# Patient Record
Sex: FEMALE | Race: WHITE | Employment: OTHER | URBAN - METROPOLITAN AREA
[De-identification: names, ages, dates, MRNs, and addresses within clinical notes are randomized per-mention and may not be internally consistent; named-entity substitution may affect disease eponyms.]

---

## 2017-08-10 ENCOUNTER — HOSPITAL ENCOUNTER (OUTPATIENT)
Dept: GENERAL RADIOLOGY | Facility: HOSPITAL | Age: 67
Discharge: HOME | End: 2017-08-10
Attending: INTERNAL MEDICINE
Payer: COMMERCIAL

## 2017-08-10 DIAGNOSIS — M54.50 LUMBAR PAIN WITH RADIATION DOWN BOTH LEGS: ICD-10-CM

## 2017-08-10 DIAGNOSIS — M79.605 LUMBAR PAIN WITH RADIATION DOWN BOTH LEGS: ICD-10-CM

## 2017-08-10 DIAGNOSIS — M79.604 LUMBAR PAIN WITH RADIATION DOWN BOTH LEGS: ICD-10-CM

## 2017-08-10 PROCEDURE — 72110 X-RAY EXAM L-2 SPINE 4/>VWS: CPT | Performed by: INTERNAL MEDICINE

## 2017-09-07 PROBLEM — S32.030K COMPRESSION FRACTURE OF L3 LUMBAR VERTEBRA WITH NONUNION: Status: ACTIVE | Noted: 2017-09-07

## 2017-10-02 DIAGNOSIS — S32.030A COMPRESSION FRACTURE OF L3 LUMBAR VERTEBRA: Primary | ICD-10-CM

## 2017-10-05 ENCOUNTER — HOSPITAL ENCOUNTER (OUTPATIENT)
Dept: INTERVENTIONAL RADIOLOGY/VASCULAR | Facility: HOSPITAL | Age: 67
Discharge: HOME | End: 2017-10-05
Attending: RADIOLOGY | Admitting: RADIOLOGY
Payer: COMMERCIAL

## 2017-10-05 VITALS
HEART RATE: 72 BPM | RESPIRATION RATE: 20 BRPM | DIASTOLIC BLOOD PRESSURE: 79 MMHG | OXYGEN SATURATION: 100 % | SYSTOLIC BLOOD PRESSURE: 151 MMHG

## 2017-10-05 DIAGNOSIS — S32.030A COMPRESSION FRACTURE OF L3 LUMBAR VERTEBRA: ICD-10-CM

## 2017-10-05 PROCEDURE — 99152 MOD SED SAME PHYS/QHP 5/>YRS: CPT

## 2017-10-05 PROCEDURE — 22514 PERQ VERTEBRAL AUGMENTATION: CPT

## 2017-10-05 PROCEDURE — 0QS03ZZ REPOSITION LUMBAR VERTEBRA, PERCUTANEOUS APPROACH: ICD-10-PCS | Performed by: RADIOLOGY

## 2017-10-05 PROCEDURE — 99153 MOD SED SAME PHYS/QHP EA: CPT

## 2017-10-05 PROCEDURE — 0QU03JZ SUPPLEMENT LUMBAR VERTEBRA WITH SYNTHETIC SUBSTITUTE, PERCUTANEOUS APPROACH: ICD-10-PCS | Performed by: RADIOLOGY

## 2017-10-05 RX ORDER — SODIUM CHLORIDE 9 MG/ML
INJECTION, SOLUTION INTRAVENOUS
Status: COMPLETED
Start: 2017-10-05 | End: 2017-10-05

## 2017-10-05 RX ORDER — LIDOCAINE HYDROCHLORIDE 20 MG/ML
INJECTION, SOLUTION EPIDURAL; INFILTRATION; INTRACAUDAL; PERINEURAL
Status: COMPLETED
Start: 2017-10-05 | End: 2017-10-05

## 2017-10-05 RX ORDER — HYDROCODONE BITARTRATE AND ACETAMINOPHEN 5; 325 MG/1; MG/1
1 TABLET ORAL EVERY 6 HOURS PRN
Status: DISCONTINUED | OUTPATIENT
Start: 2017-10-05 | End: 2017-10-05

## 2017-10-05 RX ORDER — HYDROCODONE BITARTRATE AND ACETAMINOPHEN 5; 325 MG/1; MG/1
1 TABLET ORAL EVERY 6 HOURS PRN
Qty: 30 TABLET | Refills: 0 | Status: ON HOLD | OUTPATIENT
Start: 2017-10-05 | End: 2018-09-29

## 2017-10-05 RX ORDER — HYDROCODONE BITARTRATE AND ACETAMINOPHEN 5; 325 MG/1; MG/1
TABLET ORAL
Status: COMPLETED
Start: 2017-10-05 | End: 2017-10-05

## 2017-10-05 RX ORDER — SODIUM CHLORIDE 9 MG/ML
INJECTION, SOLUTION INTRAVENOUS CONTINUOUS
Status: DISCONTINUED | OUTPATIENT
Start: 2017-10-05 | End: 2017-10-05

## 2017-10-05 RX ORDER — MIDAZOLAM HYDROCHLORIDE 1 MG/ML
INJECTION INTRAMUSCULAR; INTRAVENOUS
Status: COMPLETED
Start: 2017-10-05 | End: 2017-10-05

## 2017-10-05 RX ADMIN — HYDROCODONE BITARTRATE AND ACETAMINOPHEN 1 TABLET: 5; 325 TABLET ORAL at 15:06:00

## 2017-10-06 NOTE — PROGRESS NOTES
Patient's back pain is multifactorial, and there is a radicular component that is more likely due to her degenerative disc disease. Dr. Socorro Palmer has asked her to return to clinic to prescribe physical rx.   She might also benefit from lumbar epidermal steroid

## 2017-10-11 DIAGNOSIS — M79.604 LUMBAR PAIN WITH RADIATION DOWN BOTH LEGS: ICD-10-CM

## 2017-10-11 DIAGNOSIS — M54.50 LUMBAR PAIN WITH RADIATION DOWN BOTH LEGS: ICD-10-CM

## 2017-10-11 DIAGNOSIS — M79.605 LUMBAR PAIN WITH RADIATION DOWN BOTH LEGS: ICD-10-CM

## 2017-10-11 DIAGNOSIS — S32.030G COMPRESSION FRACTURE OF L3 LUMBAR VERTEBRA WITH DELAYED HEALING: Primary | ICD-10-CM

## 2017-10-11 DIAGNOSIS — S32.030K COMPRESSION FRACTURE OF L3 LUMBAR VERTEBRA WITH NONUNION: ICD-10-CM

## 2018-09-11 PROCEDURE — 87086 URINE CULTURE/COLONY COUNT: CPT | Performed by: UROLOGY

## 2018-09-19 ENCOUNTER — ANESTHESIA (OUTPATIENT)
Dept: SURGERY | Facility: HOSPITAL | Age: 68
DRG: 354 | End: 2018-09-19
Payer: COMMERCIAL

## 2018-09-19 ENCOUNTER — APPOINTMENT (OUTPATIENT)
Dept: CT IMAGING | Facility: HOSPITAL | Age: 68
DRG: 354 | End: 2018-09-19
Attending: EMERGENCY MEDICINE
Payer: COMMERCIAL

## 2018-09-19 ENCOUNTER — HOSPITAL ENCOUNTER (INPATIENT)
Facility: HOSPITAL | Age: 68
LOS: 11 days | Discharge: HOME HEALTH | DRG: 354 | End: 2018-09-30
Attending: EMERGENCY MEDICINE | Admitting: HOSPITALIST
Payer: COMMERCIAL

## 2018-09-19 ENCOUNTER — ANESTHESIA EVENT (OUTPATIENT)
Dept: SURGERY | Facility: HOSPITAL | Age: 68
DRG: 354 | End: 2018-09-19
Payer: COMMERCIAL

## 2018-09-19 DIAGNOSIS — K42.0 UMBILICAL HERNIA WITH OBSTRUCTION, WITHOUT GANGRENE: Primary | ICD-10-CM

## 2018-09-19 DIAGNOSIS — R18.8 OTHER ASCITES: ICD-10-CM

## 2018-09-19 DIAGNOSIS — K42.0 INCARCERATED UMBILICAL HERNIA: ICD-10-CM

## 2018-09-19 PROBLEM — D64.9 ANEMIA: Status: ACTIVE | Noted: 2018-09-19

## 2018-09-19 PROBLEM — R73.9 HYPERGLYCEMIA: Status: ACTIVE | Noted: 2018-09-19

## 2018-09-19 PROCEDURE — 96375 TX/PRO/DX INJ NEW DRUG ADDON: CPT

## 2018-09-19 PROCEDURE — 85610 PROTHROMBIN TIME: CPT | Performed by: EMERGENCY MEDICINE

## 2018-09-19 PROCEDURE — 85025 COMPLETE CBC W/AUTO DIFF WBC: CPT | Performed by: EMERGENCY MEDICINE

## 2018-09-19 PROCEDURE — 86850 RBC ANTIBODY SCREEN: CPT | Performed by: EMERGENCY MEDICINE

## 2018-09-19 PROCEDURE — 0WUF0JZ SUPPLEMENT ABDOMINAL WALL WITH SYNTHETIC SUBSTITUTE, OPEN APPROACH: ICD-10-PCS | Performed by: SURGERY

## 2018-09-19 PROCEDURE — 96374 THER/PROPH/DIAG INJ IV PUSH: CPT

## 2018-09-19 PROCEDURE — 80053 COMPREHEN METABOLIC PANEL: CPT | Performed by: EMERGENCY MEDICINE

## 2018-09-19 PROCEDURE — 99285 EMERGENCY DEPT VISIT HI MDM: CPT

## 2018-09-19 PROCEDURE — S0028 INJECTION, FAMOTIDINE, 20 MG: HCPCS | Performed by: SURGERY

## 2018-09-19 PROCEDURE — 86708 HEPATITIS A ANTIBODY: CPT | Performed by: HOSPITALIST

## 2018-09-19 PROCEDURE — 85730 THROMBOPLASTIN TIME PARTIAL: CPT | Performed by: EMERGENCY MEDICINE

## 2018-09-19 PROCEDURE — 87340 HEPATITIS B SURFACE AG IA: CPT | Performed by: HOSPITALIST

## 2018-09-19 PROCEDURE — 96376 TX/PRO/DX INJ SAME DRUG ADON: CPT

## 2018-09-19 PROCEDURE — 88112 CYTOPATH CELL ENHANCE TECH: CPT | Performed by: SURGERY

## 2018-09-19 PROCEDURE — A4216 STERILE WATER/SALINE, 10 ML: HCPCS

## 2018-09-19 PROCEDURE — 86704 HEP B CORE ANTIBODY TOTAL: CPT | Performed by: HOSPITALIST

## 2018-09-19 PROCEDURE — 86900 BLOOD TYPING SEROLOGIC ABO: CPT | Performed by: EMERGENCY MEDICINE

## 2018-09-19 PROCEDURE — 96361 HYDRATE IV INFUSION ADD-ON: CPT

## 2018-09-19 PROCEDURE — 74177 CT ABD & PELVIS W/CONTRAST: CPT | Performed by: EMERGENCY MEDICINE

## 2018-09-19 PROCEDURE — 80500 HEPATITIS A B + C PROFILE: CPT | Performed by: HOSPITALIST

## 2018-09-19 PROCEDURE — 88302 TISSUE EXAM BY PATHOLOGIST: CPT | Performed by: SURGERY

## 2018-09-19 PROCEDURE — 86901 BLOOD TYPING SEROLOGIC RH(D): CPT | Performed by: EMERGENCY MEDICINE

## 2018-09-19 PROCEDURE — A4216 STERILE WATER/SALINE, 10 ML: HCPCS | Performed by: SURGERY

## 2018-09-19 PROCEDURE — 86803 HEPATITIS C AB TEST: CPT | Performed by: HOSPITALIST

## 2018-09-19 PROCEDURE — 86706 HEP B SURFACE ANTIBODY: CPT | Performed by: HOSPITALIST

## 2018-09-19 DEVICE — VENTRALEX ST HERNIA PATCH
Type: IMPLANTABLE DEVICE | Site: UMBILICAL | Status: FUNCTIONAL
Brand: VENTRALEX ST HERNIA PATCH

## 2018-09-19 RX ORDER — ROCURONIUM BROMIDE 10 MG/ML
INJECTION, SOLUTION INTRAVENOUS AS NEEDED
Status: DISCONTINUED | OUTPATIENT
Start: 2018-09-19 | End: 2018-09-19 | Stop reason: SURG

## 2018-09-19 RX ORDER — NEOSTIGMINE METHYLSULFATE 0.5 MG/ML
INJECTION INTRAVENOUS AS NEEDED
Status: DISCONTINUED | OUTPATIENT
Start: 2018-09-19 | End: 2018-09-19 | Stop reason: SURG

## 2018-09-19 RX ORDER — ONDANSETRON 2 MG/ML
4 INJECTION INTRAMUSCULAR; INTRAVENOUS ONCE AS NEEDED
Status: DISCONTINUED | OUTPATIENT
Start: 2018-09-19 | End: 2018-09-19 | Stop reason: HOSPADM

## 2018-09-19 RX ORDER — ONDANSETRON 2 MG/ML
4 INJECTION INTRAMUSCULAR; INTRAVENOUS EVERY 6 HOURS PRN
Status: DISCONTINUED | OUTPATIENT
Start: 2018-09-19 | End: 2018-09-19

## 2018-09-19 RX ORDER — SODIUM CHLORIDE, SODIUM LACTATE, POTASSIUM CHLORIDE, CALCIUM CHLORIDE 600; 310; 30; 20 MG/100ML; MG/100ML; MG/100ML; MG/100ML
INJECTION, SOLUTION INTRAVENOUS CONTINUOUS
Status: DISCONTINUED | OUTPATIENT
Start: 2018-09-19 | End: 2018-09-19

## 2018-09-19 RX ORDER — ONDANSETRON 2 MG/ML
INJECTION INTRAMUSCULAR; INTRAVENOUS
Status: DISPENSED
Start: 2018-09-19 | End: 2018-09-20

## 2018-09-19 RX ORDER — MORPHINE SULFATE 2 MG/ML
1 INJECTION, SOLUTION INTRAMUSCULAR; INTRAVENOUS EVERY 2 HOUR PRN
Status: DISCONTINUED | OUTPATIENT
Start: 2018-09-19 | End: 2018-09-27

## 2018-09-19 RX ORDER — HEPARIN SODIUM 5000 [USP'U]/ML
5000 INJECTION, SOLUTION INTRAVENOUS; SUBCUTANEOUS EVERY 12 HOURS SCHEDULED
Status: DISCONTINUED | OUTPATIENT
Start: 2018-09-19 | End: 2018-09-24

## 2018-09-19 RX ORDER — METOCLOPRAMIDE HYDROCHLORIDE 5 MG/ML
10 INJECTION INTRAMUSCULAR; INTRAVENOUS EVERY 8 HOURS PRN
Status: DISCONTINUED | OUTPATIENT
Start: 2018-09-19 | End: 2018-09-30

## 2018-09-19 RX ORDER — FAMOTIDINE 20 MG/1
20 TABLET ORAL 2 TIMES DAILY
Status: DISCONTINUED | OUTPATIENT
Start: 2018-09-19 | End: 2018-09-27

## 2018-09-19 RX ORDER — MORPHINE SULFATE 4 MG/ML
4 INJECTION, SOLUTION INTRAMUSCULAR; INTRAVENOUS EVERY 2 HOUR PRN
Status: DISCONTINUED | OUTPATIENT
Start: 2018-09-19 | End: 2018-09-26

## 2018-09-19 RX ORDER — ONDANSETRON 2 MG/ML
4 INJECTION INTRAMUSCULAR; INTRAVENOUS EVERY 6 HOURS PRN
Status: DISCONTINUED | OUTPATIENT
Start: 2018-09-19 | End: 2018-09-30

## 2018-09-19 RX ORDER — HYDROCODONE BITARTRATE AND ACETAMINOPHEN 5; 325 MG/1; MG/1
1 TABLET ORAL AS NEEDED
Status: DISCONTINUED | OUTPATIENT
Start: 2018-09-19 | End: 2018-09-19 | Stop reason: HOSPADM

## 2018-09-19 RX ORDER — MORPHINE SULFATE 10 MG/ML
6 INJECTION, SOLUTION INTRAMUSCULAR; INTRAVENOUS EVERY 10 MIN PRN
Status: DISCONTINUED | OUTPATIENT
Start: 2018-09-19 | End: 2018-09-19 | Stop reason: HOSPADM

## 2018-09-19 RX ORDER — NALOXONE HYDROCHLORIDE 1 MG/ML
80 INJECTION INTRAMUSCULAR; INTRAVENOUS; SUBCUTANEOUS AS NEEDED
Status: DISCONTINUED | OUTPATIENT
Start: 2018-09-19 | End: 2018-09-19 | Stop reason: HOSPADM

## 2018-09-19 RX ORDER — SODIUM CHLORIDE, SODIUM LACTATE, POTASSIUM CHLORIDE, CALCIUM CHLORIDE 600; 310; 30; 20 MG/100ML; MG/100ML; MG/100ML; MG/100ML
INJECTION, SOLUTION INTRAVENOUS CONTINUOUS
Status: DISCONTINUED | OUTPATIENT
Start: 2018-09-19 | End: 2018-09-19 | Stop reason: HOSPADM

## 2018-09-19 RX ORDER — GLYCOPYRROLATE 0.2 MG/ML
INJECTION INTRAMUSCULAR; INTRAVENOUS AS NEEDED
Status: DISCONTINUED | OUTPATIENT
Start: 2018-09-19 | End: 2018-09-19 | Stop reason: SURG

## 2018-09-19 RX ORDER — MORPHINE SULFATE 2 MG/ML
2 INJECTION, SOLUTION INTRAMUSCULAR; INTRAVENOUS EVERY 2 HOUR PRN
Status: DISCONTINUED | OUTPATIENT
Start: 2018-09-19 | End: 2018-09-26

## 2018-09-19 RX ORDER — HALOPERIDOL 5 MG/ML
0.25 INJECTION INTRAMUSCULAR ONCE AS NEEDED
Status: DISCONTINUED | OUTPATIENT
Start: 2018-09-19 | End: 2018-09-19 | Stop reason: HOSPADM

## 2018-09-19 RX ORDER — ACETAMINOPHEN 325 MG/1
650 TABLET ORAL EVERY 6 HOURS PRN
Status: DISCONTINUED | OUTPATIENT
Start: 2018-09-19 | End: 2018-09-27

## 2018-09-19 RX ORDER — ONDANSETRON 2 MG/ML
4 INJECTION INTRAMUSCULAR; INTRAVENOUS ONCE
Status: COMPLETED | OUTPATIENT
Start: 2018-09-19 | End: 2018-09-19

## 2018-09-19 RX ORDER — MORPHINE SULFATE 4 MG/ML
2 INJECTION, SOLUTION INTRAMUSCULAR; INTRAVENOUS EVERY 10 MIN PRN
Status: DISCONTINUED | OUTPATIENT
Start: 2018-09-19 | End: 2018-09-19 | Stop reason: HOSPADM

## 2018-09-19 RX ORDER — MORPHINE SULFATE 4 MG/ML
4 INJECTION, SOLUTION INTRAMUSCULAR; INTRAVENOUS ONCE
Status: COMPLETED | OUTPATIENT
Start: 2018-09-19 | End: 2018-09-19

## 2018-09-19 RX ORDER — HYDROCODONE BITARTRATE AND ACETAMINOPHEN 5; 325 MG/1; MG/1
2 TABLET ORAL AS NEEDED
Status: DISCONTINUED | OUTPATIENT
Start: 2018-09-19 | End: 2018-09-19 | Stop reason: HOSPADM

## 2018-09-19 RX ORDER — BUPIVACAINE HYDROCHLORIDE 5 MG/ML
INJECTION, SOLUTION EPIDURAL; INTRACAUDAL AS NEEDED
Status: DISCONTINUED | OUTPATIENT
Start: 2018-09-19 | End: 2018-09-19 | Stop reason: HOSPADM

## 2018-09-19 RX ORDER — EPHEDRINE SULFATE 50 MG/ML
INJECTION, SOLUTION INTRAVENOUS AS NEEDED
Status: DISCONTINUED | OUTPATIENT
Start: 2018-09-19 | End: 2018-09-19 | Stop reason: SURG

## 2018-09-19 RX ORDER — MORPHINE SULFATE 4 MG/ML
4 INJECTION, SOLUTION INTRAMUSCULAR; INTRAVENOUS EVERY 10 MIN PRN
Status: DISCONTINUED | OUTPATIENT
Start: 2018-09-19 | End: 2018-09-19 | Stop reason: HOSPADM

## 2018-09-19 RX ORDER — FAMOTIDINE 10 MG/ML
20 INJECTION, SOLUTION INTRAVENOUS 2 TIMES DAILY
Status: DISCONTINUED | OUTPATIENT
Start: 2018-09-19 | End: 2018-09-27

## 2018-09-19 RX ORDER — SODIUM CHLORIDE 0.9 % (FLUSH) 0.9 %
10 SYRINGE (ML) INJECTION AS NEEDED
Status: DISCONTINUED | OUTPATIENT
Start: 2018-09-19 | End: 2018-09-30

## 2018-09-19 RX ORDER — SODIUM CHLORIDE 9 MG/ML
INJECTION, SOLUTION INTRAVENOUS CONTINUOUS
Status: DISPENSED | OUTPATIENT
Start: 2018-09-19 | End: 2018-09-20

## 2018-09-19 RX ORDER — ACETAMINOPHEN 500 MG
1000 TABLET ORAL ONCE
Status: DISCONTINUED | OUTPATIENT
Start: 2018-09-19 | End: 2018-09-19 | Stop reason: HOSPADM

## 2018-09-19 RX ORDER — 0.9 % SODIUM CHLORIDE 0.9 %
VIAL (ML) INJECTION
Status: COMPLETED
Start: 2018-09-19 | End: 2018-09-19

## 2018-09-19 RX ADMIN — GLYCOPYRROLATE 0.8 MG: 0.2 INJECTION INTRAMUSCULAR; INTRAVENOUS at 09:30:00

## 2018-09-19 RX ADMIN — EPHEDRINE SULFATE 10 MG: 50 INJECTION, SOLUTION INTRAVENOUS at 08:30:00

## 2018-09-19 RX ADMIN — ROCURONIUM BROMIDE 30 MG: 10 INJECTION, SOLUTION INTRAVENOUS at 08:25:00

## 2018-09-19 RX ADMIN — NEOSTIGMINE METHYLSULFATE 5 MG: 0.5 INJECTION INTRAVENOUS at 09:30:00

## 2018-09-19 RX ADMIN — SODIUM CHLORIDE, SODIUM LACTATE, POTASSIUM CHLORIDE, CALCIUM CHLORIDE: 600; 310; 30; 20 INJECTION, SOLUTION INTRAVENOUS at 09:52:00

## 2018-09-19 RX ADMIN — SODIUM CHLORIDE, SODIUM LACTATE, POTASSIUM CHLORIDE, CALCIUM CHLORIDE: 600; 310; 30; 20 INJECTION, SOLUTION INTRAVENOUS at 08:25:00

## 2018-09-19 NOTE — ANESTHESIA POSTPROCEDURE EVALUATION
Patient: Lisa Mallory    Procedure Summary     Date:  09/19/18 Room / Location:  [address] [place] [address] MAIN OR 05 / [address] [place] [address] MAIN OR    Anesthesia Start:  0014 Anesthesia Stop:      Procedures:        HERNIA UMBILICAL REPAIR ADULT (N/A )      SMALL BOWEL OBSTRUCTION RELEASE (N/

## 2018-09-19 NOTE — ED PROVIDER NOTES
Patient Seen in: [facility] AND [facility] Emergency Department    History   Patient presents with:  Hernia    Stated Complaint: sent in by PCP for umbilical hernia    HPI    79 yo female with painful swelling to the umbilicus that she has had for several months and reactive to light. Neck: Normal range of motion. Neck supple. Cardiovascular: Normal rate, regular rhythm, normal heart sounds and intact distal pulses. Pulmonary/Chest: Effort normal and breath sounds normal.   Abdominal: Soft.  Bowel sounds are CBC WITH DIFFERENTIAL WITH PLATELET.   Procedure                               Abnormality         Status                     ---------                               -----------         ------                     CBC W/ DIFFERENTIAL[[id number]]          Abno diverticulosis. Probable wet bowel pattern in the right and transverse colon favored over colitis. Air-filled appendix. Moderate L3 compression deformity with kyphoplasty cement.   Severe L5-S1 degenerative disc disease with bilateral pars defects and

## 2018-09-19 NOTE — BRIEF OP NOTE
Pre-Operative Diagnosis: Incarcerated incisional hernia   Post-Operative Diagnosis: Incarcerated incisional hernia    Procedure Performed:   Procedure(s):  reduction and repair of incarcerated umbilical hernia  With mesh, small ventralex    Surgeon(s) and

## 2018-09-19 NOTE — ANESTHESIA PROCEDURE NOTES
Arterial Line  Performed by: Jass Subramanian MD  Authorized by: Jass Subramanian MD     Procedure Start:  9/19/2018 8:31 AM  Procedure End:  9/19/2018 8:35 AM  Site Identification: surface landmarks    Patient Location:  OR  Indication: continuous b

## 2018-09-19 NOTE — ANESTHESIA PROCEDURE NOTES
Peripheral IV  Date/Time: 9/19/2018 8:41 AM  Inserted by: Jose Dior MD    Placement  Laterality: left  Location: hand  Local anesthetic: none  Site prep: alcohol  Technique: anatomical landmarks  Attempts: 1

## 2018-09-19 NOTE — ANESTHESIA PROCEDURE NOTES
ANESTHESIA INTUBATION  Date/Time: 9/19/2018 8:40 AM  Urgency: elective    Airway not difficult    General Information and Staff    Patient location during procedure: OR  Anesthesiologist: Jonathan Maldonado MD  Performed: anesthesiologist     Indications a

## 2018-09-19 NOTE — ANESTHESIA PREPROCEDURE EVALUATION
Anesthesia PreOp Note    HPI:     David Peguero is a 78year old female who presents for preoperative consultation requested by: Kathy Anglin MD    Date of Surgery: 9/19/2018    Procedure(s):   HERNIA UMBILICAL REPAIR ADULT  SMALL BOWEL OBSTRUCTION RELEA (six) hours as needed for Pain. Disp: 30 tablet Rfl: 0    Fluticasone Propionate 50 MCG/ACT Nasal Suspension 2 sprays by Each Nare route daily.  Disp:  Rfl:  Not Taking       Current Facility-Administered Medications Ordered in Epic:  [MAR Hold] acetaminoph Packs/day: 1.00        Years: 20.00        Pack years: 21        Quit date: 1950        Years since quittin.3      Smokeless tobacco: Never Used    Substance and Sexual Activity      Alcohol use: No        Alcohol/week: 0.0 oz      Drug use: No Anesthesia Plan:   ASA:  3  Emergent    Plan:   General  Airway:  ETT  Post-op Pain Management: IV analgesics      I have informed Teresita Campos and/or legal guardian or family member of the nature of the anesthetic plan, benefits, risks incl

## 2018-09-19 NOTE — H&P
[facility] Hospitalist H&P       CC: Patient presents with:  Hernia       PCP: Jeromy Padilla MD    ASSESSMENT / PLAN:    Patient is a 78year old female with PMH sig for allergic rhinitis who presents with a c/co of abdominal pain.     Incarcerated hernia s/p Medications Marked as Taking for the 9/19/18 encounter [facility] Encounter):  Pantoprazole Sodium (PROTONIX) 40 MG Oral Tab EC Take 1 tablet (40 mg total) by mouth every morning before breakfast. Disp: 90 tablet Rfl: 3   Acetaminophen-Codeine #3 300-30 MG or supraclavicular lymph adenopathy, thyroid: no enlargment/tenderness/nodules appreciated   Lungs:   Clear to auscultation bilaterally. Normal effort   Chest wall:  No tenderness or deformity.    Heart:  Regular rate and rhythm, S1, S2 normal, no murmur, r results in gas and fluid distention of small bowel proximal to the hernia, and distal decompression. Findings are suspicious for incarcerated hernia resulting in small bowel obstruction.   2. Cirrhosis with stigmata of portal hypertension including numerous

## 2018-09-19 NOTE — H&P
[facility] HOSP - [facility]    Report of Consultation    Kirstin Brown Patient Status:  Inpatient    1950 MRN [id number]   Location [facility] 4W/SW/SE Attending Marie Baig MD   Hosp Day # 0 PCP Roddy Churchill MD     Date of Admis see her dictation for the details of those   surgeries.   SURGEON:  Dr. Celso Jackson       History:  Past Medical History:   Diagnosis Date   • Allergic rhinitis    • Chronic lumbar pain    • Insomnia    • Recurrent UTI      Past Surgical History:  No °C), temperature source Oral, resp. rate 18, height 67\", weight 177 lb 6.4 oz (80.5 kg), SpO2 97 %. General: Alert, orientated x3. Cooperative. No apparent distress. Patient has mild to moderate discomfort. HEENT: Exam is unremarkable.   Without scl stigmata of portal hypertension including numerous portosystemic collateral vessels, mild to moderate diffuse ascites, borderline splenomegaly. 3. Wall thickening throughout the colon with relative sparing of the distal sigmoid and rectum.  Findings may be

## 2018-09-19 NOTE — ED INITIAL ASSESSMENT (HPI)
Pt reports soreness to umbilical hernia, states that she has had this for a couple of months and was told by EMTs that it looked like it was going to rupture. Pt's PCP told her to come to the ER for eval. Pt states that the pain brought her in tonight.  Darline Smalls

## 2018-09-20 PROCEDURE — 80048 BASIC METABOLIC PNL TOTAL CA: CPT | Performed by: SURGERY

## 2018-09-20 PROCEDURE — S0028 INJECTION, FAMOTIDINE, 20 MG: HCPCS | Performed by: SURGERY

## 2018-09-20 PROCEDURE — 83735 ASSAY OF MAGNESIUM: CPT | Performed by: HOSPITALIST

## 2018-09-20 PROCEDURE — 85027 COMPLETE CBC AUTOMATED: CPT | Performed by: SURGERY

## 2018-09-20 RX ORDER — SODIUM CHLORIDE 9 MG/ML
INJECTION, SOLUTION INTRAVENOUS
Status: COMPLETED
Start: 2018-09-20 | End: 2018-09-20

## 2018-09-20 RX ORDER — MAGNESIUM SULFATE HEPTAHYDRATE 40 MG/ML
2 INJECTION, SOLUTION INTRAVENOUS ONCE
Status: COMPLETED | OUTPATIENT
Start: 2018-09-20 | End: 2018-09-20

## 2018-09-20 RX ORDER — SODIUM CHLORIDE 9 MG/ML
INJECTION, SOLUTION INTRAVENOUS CONTINUOUS
Status: DISCONTINUED | OUTPATIENT
Start: 2018-09-20 | End: 2018-09-20

## 2018-09-20 RX ORDER — SODIUM CHLORIDE 9 MG/ML
INJECTION, SOLUTION INTRAVENOUS CONTINUOUS
Status: DISCONTINUED | OUTPATIENT
Start: 2018-09-20 | End: 2018-09-23

## 2018-09-20 NOTE — PROGRESS NOTES
[facility] Hospitalist Progress note       CC: Patient presents with:  Hernia     CC; follow up    PCP: Zaki Fam MD    ASSESSMENT / PLAN:   Ms. Venice Robison is a 78year old female with PMH sig for allergic rhinitis who presents with a c/co of abdominal pain. kg)  07/03/18 1336 : 181 lb (82.1 kg)      Exam  Gen: No acute distress, alert and oriented x3  Neck Supple, no JVD  Pulm: Lungs clear, normal respiratory effort, No wheezing or crackles  CV: Heart with regular rate and rhythm, No murmurs, rubs, gallops  A (er)    Result Date: 9/19/2018  CONCLUSION:   1. There is an umbilical hernia containing fat and a short segment of small bowel. This results in gas and fluid distention of small bowel proximal to the hernia, and distal decompression.  Findings are suspicio

## 2018-09-20 NOTE — PROGRESS NOTES
[facility]    Progress Note    Demian Ham Patient Status:  Inpatient    1950 MRN [id number]   Location [facility] 4W/SW/SE Attending Traci aMck MD   Hosp Day # 1 PCP Pao Frias MD            Subjective: Ct Abdomen Pelvis Iv Contrast, No Oral (er)    Result Date: 9/19/2018  CONCLUSION:   1. There is an umbilical hernia containing fat and a short segment of small bowel.  This results in gas and fluid distention of small bowel proximal to the ravi

## 2018-09-20 NOTE — OPERATIVE REPORT
[facility]    PATIENT'S NAME: Alfredo HOWARD   ATTENDING PHYSICIAN: Alex Ness DO   OPERATING PHYSICIAN: Vidal Pickard MD   PATIENT ACCOUNT#:   [id number]    LOCATION:  [address] [place] [address] RECORD #:   [id number]       DATE OF BIRTH:  [birth year] she had an elevated INR and ascites consistent with liver disease. She looked like she had cirrhosis on CT scan, as well. This was new and undiagnosed.   I explained there was increased risk of postop problems with ascitic leak, recurrence of the herni without suturing it. The skin closed at the skin edges subcutaneous and then subcuticular stitch placed. Sterile dressings applied. Before closing, hemostasis had been carefully assured.   A small drain was placed, brought out laterally, which we will ta

## 2018-09-21 PROCEDURE — 83735 ASSAY OF MAGNESIUM: CPT | Performed by: HOSPITALIST

## 2018-09-21 PROCEDURE — 84132 ASSAY OF SERUM POTASSIUM: CPT | Performed by: HOSPITALIST

## 2018-09-21 RX ORDER — HYDROCODONE BITARTRATE AND ACETAMINOPHEN 7.5; 325 MG/1; MG/1
1 TABLET ORAL EVERY 4 HOURS PRN
Status: DISCONTINUED | OUTPATIENT
Start: 2018-09-21 | End: 2018-09-27

## 2018-09-21 NOTE — PLAN OF CARE
GASTROINTESTINAL - ADULT    • Minimal or absence of nausea and vomiting Progressing    • Maintains or returns to baseline bowel function Progressing        GENITOURINARY - ADULT    • Absence of urinary retention Not Progressing        Impaired Activities o

## 2018-09-21 NOTE — PROGRESS NOTES
[facility] Hospitalist Progress note       CC: Patient presents with:  Hernia     CC; follow up    PCP: Harini Chan MD    ASSESSMENT / PLAN:   Ms. Ruth Ann Fuentes is a 78year old female with PMH sig for allergic rhinitis who presents with a c/co of abdominal pain. distress, alert and oriented x3  Neck Supple, no JVD  Pulm: Lungs clear, normal respiratory effort, No wheezing or crackles  CV: Heart with regular rate and rhythm, No murmurs, rubs, gallops  Abd: Abdomen soft, mildly distended, very hypoactive BS, tender hypertension including moderate volume ascites    Ct Abdomen Pelvis Iv Contrast, No Oral (er)    Result Date: 9/19/2018  CONCLUSION:   1. There is an umbilical hernia containing fat and a short segment of small bowel.  This results in gas and fluid distenti

## 2018-09-22 ENCOUNTER — APPOINTMENT (OUTPATIENT)
Dept: GENERAL RADIOLOGY | Facility: HOSPITAL | Age: 68
DRG: 354 | End: 2018-09-22
Attending: SURGERY
Payer: COMMERCIAL

## 2018-09-22 PROCEDURE — 85025 COMPLETE CBC W/AUTO DIFF WBC: CPT | Performed by: HOSPITALIST

## 2018-09-22 PROCEDURE — 80048 BASIC METABOLIC PNL TOTAL CA: CPT | Performed by: HOSPITALIST

## 2018-09-22 PROCEDURE — 85610 PROTHROMBIN TIME: CPT | Performed by: HOSPITALIST

## 2018-09-22 PROCEDURE — 74019 RADEX ABDOMEN 2 VIEWS: CPT | Performed by: SURGERY

## 2018-09-22 PROCEDURE — 86850 RBC ANTIBODY SCREEN: CPT | Performed by: HOSPITALIST

## 2018-09-22 PROCEDURE — 86901 BLOOD TYPING SEROLOGIC RH(D): CPT | Performed by: HOSPITALIST

## 2018-09-22 PROCEDURE — A4216 STERILE WATER/SALINE, 10 ML: HCPCS | Performed by: SURGERY

## 2018-09-22 PROCEDURE — S0028 INJECTION, FAMOTIDINE, 20 MG: HCPCS | Performed by: SURGERY

## 2018-09-22 PROCEDURE — 86900 BLOOD TYPING SEROLOGIC ABO: CPT | Performed by: HOSPITALIST

## 2018-09-22 NOTE — PROGRESS NOTES
[facility] Hospitalist Progress note       CC: Patient presents with:  Hernia     CC; follow up    PCP: Kaci Bermudez MD    ASSESSMENT / PLAN:   Ms. Bascom Litten is a 78year old female with PMH sig for allergic rhinitis who presents with a c/co of abdominal pain. acute distress, alert and oriented x3  Neck Supple, no JVD  Pulm: Lungs clear, normal respiratory effort, No wheezing or crackles  CV: Heart with regular rate and rhythm, No murmurs, rubs, gallops  Abd: Abdomen soft, mildly distended, very hypoactive BS, t ascending colon. Imaging findings may represent diverticulitis. No loculated abscess, free air or bowel obstruction. Imaging follow-up to assure resolution and exclude underlying malignancy is advised.  2. Cirrhotic appearing liver with sequelae of portal h

## 2018-09-22 NOTE — PROGRESS NOTES
[facility]    Progress Note    Radha Celaya Patient Status:  Inpatient    1950 MRN [id number]   Location [facility] 4W/SW/SE Attending Houston Soares MD   Hosp Day # 2 PCP Ghislaine Person MD            Subjective: --    ALKPHO  159*   --    --    AST  38   --    --    ALT  16   --    --    BILT  1.2   --    --    TP  7.1   --    --                          Assessment and Plan:         Hyperglycemia     Hyperglycemia        Umbilical hernia with obstruction, without

## 2018-09-23 PROCEDURE — 84466 ASSAY OF TRANSFERRIN: CPT | Performed by: INTERNAL MEDICINE

## 2018-09-23 PROCEDURE — 85610 PROTHROMBIN TIME: CPT | Performed by: HOSPITALIST

## 2018-09-23 PROCEDURE — 86256 FLUORESCENT ANTIBODY TITER: CPT | Performed by: INTERNAL MEDICINE

## 2018-09-23 PROCEDURE — 86038 ANTINUCLEAR ANTIBODIES: CPT | Performed by: INTERNAL MEDICINE

## 2018-09-23 PROCEDURE — 84132 ASSAY OF SERUM POTASSIUM: CPT | Performed by: SURGERY

## 2018-09-23 PROCEDURE — 82390 ASSAY OF CERULOPLASMIN: CPT | Performed by: INTERNAL MEDICINE

## 2018-09-23 PROCEDURE — 82140 ASSAY OF AMMONIA: CPT | Performed by: INTERNAL MEDICINE

## 2018-09-23 PROCEDURE — 80048 BASIC METABOLIC PNL TOTAL CA: CPT | Performed by: HOSPITALIST

## 2018-09-23 PROCEDURE — 86376 MICROSOMAL ANTIBODY EACH: CPT | Performed by: INTERNAL MEDICINE

## 2018-09-23 PROCEDURE — 83540 ASSAY OF IRON: CPT | Performed by: INTERNAL MEDICINE

## 2018-09-23 PROCEDURE — 85025 COMPLETE CBC W/AUTO DIFF WBC: CPT | Performed by: HOSPITALIST

## 2018-09-23 PROCEDURE — A4216 STERILE WATER/SALINE, 10 ML: HCPCS | Performed by: SURGERY

## 2018-09-23 PROCEDURE — 82103 ALPHA-1-ANTITRYPSIN TOTAL: CPT | Performed by: INTERNAL MEDICINE

## 2018-09-23 RX ORDER — SPIRONOLACTONE 50 MG/1
50 TABLET, FILM COATED ORAL DAILY
Status: DISCONTINUED | OUTPATIENT
Start: 2018-09-23 | End: 2018-09-30

## 2018-09-23 RX ORDER — BISACODYL 10 MG
10 SUPPOSITORY, RECTAL RECTAL 2 TIMES DAILY
Status: DISPENSED | OUTPATIENT
Start: 2018-09-23 | End: 2018-09-25

## 2018-09-23 RX ORDER — LIDOCAINE 50 MG/G
2 PATCH TOPICAL DAILY
Status: DISCONTINUED | OUTPATIENT
Start: 2018-09-23 | End: 2018-09-30

## 2018-09-23 RX ORDER — SPIRONOLACTONE 25 MG/1
25 TABLET ORAL DAILY
Status: DISCONTINUED | OUTPATIENT
Start: 2018-09-24 | End: 2018-09-23

## 2018-09-23 RX ORDER — FUROSEMIDE 10 MG/ML
20 INJECTION INTRAMUSCULAR; INTRAVENOUS ONCE
Status: COMPLETED | OUTPATIENT
Start: 2018-09-23 | End: 2018-09-23

## 2018-09-23 NOTE — PROGRESS NOTES
[facility]    Progress Note    Saray Malhotra Patient Status:  Inpatient    1950 MRN [id number]   Location [facility] 4W/SW/SE Attending Donny Burdick MD   Hosp Day # 4 PCP Roz Roca MD            Subjective: 143*  100*   --   92  117*   BUN  8  9   --   5*  3*   CREATSERUM  0.56  0.56   --   0.48*  0.47*   GFRAA  >60  >60   --   >60  >60   GFRNAA  >60  >60   --   >60  >60   CA  8.7  8.2*   --   7.8*  8.2*   ALB  3.4*   --    --    --    --    NA  135*  137   -

## 2018-09-23 NOTE — CONSULTS
[facility] HOSP - [facility]    Report of Consultation    Teresita Campos Patient Status:  Inpatient    1950 MRN [id number]   Location [facility] 4W/SW/SE Attending Wanda Valle MD   Hosp Day # 4 PCP Emelyn Mejia MD     Date of Admis 1949        Years since quittin.3      Smokeless tobacco: Never Used    Alcohol use: No      Alcohol/week: 0.0 oz    Drug use: No         Current Medications:    Current Facility-Administered Medications:  lidocaine (LIDODERM) 5 % 2 patch 2 patch are noted in HPI. Physical Exam:   Blood pressure 135/64, pulse 73, temperature 97.2 °F (36.2 °C), temperature source Oral, resp. rate 18, height 5' 7\" (1.702 m), weight 177 lb 6.4 oz (80.5 kg), SpO2 100 %.     General appearance:  alert, appears stated INR 1.7 (H) 09/23/2018    PTP 19.2 (H) 09/23/2018    MG 2.1 09/21/2018         Imaging:  Xr Abdomen 2 Views(cpt=74019)    Result Date: 9/22/2018  CONCLUSION:  1. Diffuse ileus.      Dictated by (CST): Zac Herr MD on 9/22/2018 at 8:59     A

## 2018-09-23 NOTE — PROGRESS NOTES
[facility] Hospitalist Progress note       CC: Patient presents with:  Hernia     CC; follow up    PCP: Zulay Galindo MD    ASSESSMENT / PLAN:   Ms. Radha Vega is a 78year old female with PMH sig for allergic rhinitis who presents with a c/co of abdominal pain. Ivonne Fierro MD  [facility] Hospitalist  Answering service: [phone]      Subjective       Had 3 episodes of flatus so far today, still pretty distended, having a lot of LBP, chronic issue. No CP or SOB. DId ambulate already today.   Some nausea, no vomiting, Recent Labs   Lab  09/19/18   0112  09/20/18   0628  09/22/18   0545  09/23/18   0641   WBC  4.3  6.7  5.9  5.8   HGB  8.7*  8.3*  8.0*  8.4*   MCV  95.7  96.1  94.9  96.2   PLT  157  147  152  185   INR  1.5*   --   1.7*  1.7*       Recent Labs   La ascites, borderline splenomegaly. 3. Wall thickening throughout the colon with relative sparing of the distal sigmoid and rectum. Findings may be on the basis of portal hypertensive colopathy or nonspecific colitis.   4. Minimal intraluminal gas within the

## 2018-09-23 NOTE — PROGRESS NOTES
[facility]    Progress Note    Angelika Addison Patient Status:  Inpatient    1950 MRN [id number]   Location [facility] 4W/SW/SE Attending Jayce Ma MD   Hosp Day # 3 PCP Danica Messer MD            Subjective: >60   GFRNAA  >60  >60   --   >60   CA  8.7  8.2*   --   7.8*   ALB  3.4*   --    --    --    NA  135*  137   --   135*   K  3.3  3.4  3.9  3.3   CL  103  108   --   108   CO2  24  24   --   25   ALKPHO  159*   --    --    --    AST  38   --    --    --

## 2018-09-24 ENCOUNTER — APPOINTMENT (OUTPATIENT)
Dept: ULTRASOUND IMAGING | Facility: HOSPITAL | Age: 68
DRG: 354 | End: 2018-09-24
Attending: INTERNAL MEDICINE
Payer: COMMERCIAL

## 2018-09-24 ENCOUNTER — APPOINTMENT (OUTPATIENT)
Dept: ULTRASOUND IMAGING | Facility: HOSPITAL | Age: 68
DRG: 354 | End: 2018-09-24
Attending: SURGERY
Payer: COMMERCIAL

## 2018-09-24 PROCEDURE — 82728 ASSAY OF FERRITIN: CPT | Performed by: HOSPITALIST

## 2018-09-24 PROCEDURE — 84132 ASSAY OF SERUM POTASSIUM: CPT | Performed by: HOSPITALIST

## 2018-09-24 PROCEDURE — 88160 CYTOPATH SMEAR OTHER SOURCE: CPT | Performed by: INTERNAL MEDICINE

## 2018-09-24 PROCEDURE — 88112 CYTOPATH CELL ENHANCE TECH: CPT | Performed by: INTERNAL MEDICINE

## 2018-09-24 PROCEDURE — 87070 CULTURE OTHR SPECIMN AEROBIC: CPT | Performed by: INTERNAL MEDICINE

## 2018-09-24 PROCEDURE — 80076 HEPATIC FUNCTION PANEL: CPT | Performed by: HOSPITALIST

## 2018-09-24 PROCEDURE — 76705 ECHO EXAM OF ABDOMEN: CPT | Performed by: SURGERY

## 2018-09-24 PROCEDURE — 82150 ASSAY OF AMYLASE: CPT | Performed by: INTERNAL MEDICINE

## 2018-09-24 PROCEDURE — 82945 GLUCOSE OTHER FLUID: CPT | Performed by: INTERNAL MEDICINE

## 2018-09-24 PROCEDURE — 89050 BODY FLUID CELL COUNT: CPT | Performed by: INTERNAL MEDICINE

## 2018-09-24 PROCEDURE — 85025 COMPLETE CBC W/AUTO DIFF WBC: CPT | Performed by: HOSPITALIST

## 2018-09-24 PROCEDURE — 80048 BASIC METABOLIC PNL TOTAL CA: CPT | Performed by: HOSPITALIST

## 2018-09-24 PROCEDURE — 86255 FLUORESCENT ANTIBODY SCREEN: CPT | Performed by: INTERNAL MEDICINE

## 2018-09-24 PROCEDURE — 89051 BODY FLUID CELL COUNT: CPT | Performed by: INTERNAL MEDICINE

## 2018-09-24 PROCEDURE — 87205 SMEAR GRAM STAIN: CPT | Performed by: INTERNAL MEDICINE

## 2018-09-24 PROCEDURE — 84157 ASSAY OF PROTEIN OTHER: CPT | Performed by: INTERNAL MEDICINE

## 2018-09-24 PROCEDURE — 0W9G3ZZ DRAINAGE OF PERITONEAL CAVITY, PERCUTANEOUS APPROACH: ICD-10-PCS | Performed by: INTERNAL MEDICINE

## 2018-09-24 PROCEDURE — 49083 ABD PARACENTESIS W/IMAGING: CPT | Performed by: INTERNAL MEDICINE

## 2018-09-24 PROCEDURE — 85610 PROTHROMBIN TIME: CPT | Performed by: HOSPITALIST

## 2018-09-24 PROCEDURE — 82042 OTHER SOURCE ALBUMIN QUAN EA: CPT | Performed by: INTERNAL MEDICINE

## 2018-09-24 RX ORDER — FUROSEMIDE 20 MG/1
20 TABLET ORAL DAILY
Status: DISCONTINUED | OUTPATIENT
Start: 2018-09-25 | End: 2018-09-28

## 2018-09-24 RX ORDER — POTASSIUM CHLORIDE 20 MEQ/1
40 TABLET, EXTENDED RELEASE ORAL EVERY 4 HOURS
Status: COMPLETED | OUTPATIENT
Start: 2018-09-24 | End: 2018-09-24

## 2018-09-24 RX ORDER — HEPARIN SODIUM 5000 [USP'U]/ML
5000 INJECTION, SOLUTION INTRAVENOUS; SUBCUTANEOUS EVERY 12 HOURS SCHEDULED
Status: DISCONTINUED | OUTPATIENT
Start: 2018-09-24 | End: 2018-09-26

## 2018-09-24 RX ORDER — SODIUM CHLORIDE 9 MG/ML
INJECTION, SOLUTION INTRAVENOUS ONCE
Status: DISCONTINUED | OUTPATIENT
Start: 2018-09-24 | End: 2018-09-24

## 2018-09-24 RX ORDER — SODIUM CHLORIDE 0.9 % (FLUSH) 0.9 %
10 SYRINGE (ML) INJECTION AS NEEDED
Status: DISCONTINUED | OUTPATIENT
Start: 2018-09-24 | End: 2018-09-30

## 2018-09-24 NOTE — PLAN OF CARE
Pt had paracentesis today, 3700 ml out. Continues to take Norco q4h for pain. Lidoderm patches in place.

## 2018-09-24 NOTE — PROGRESS NOTES
[facility]  Gastroenterology Progress Note    Bob Cortes Patient Status:  Inpatient    1950 MRN [id number]   Location [facility] 4W/SW/SE Attending Violeta Amin, DO   Hosp Day # 5 PCP Paul King MD     Subjective:  Saray Smallwood liver disease (PEREZ). Rare ETOH use. Viral serologies negative. Also noted anemia with iron deficient component. -Viral serologies negative, other serologies pending.  If negative may suspect PEREZ, however given uncertainty would likely obtain a liver bi

## 2018-09-24 NOTE — PROGRESS NOTES
[facility] Hospitalist Progress note       CC: Patient presents with:  Hernia     CC; follow up    PCP: Zaki Fam MD    ASSESSMENT / PLAN:   Ms. Venice Robison is a 78year old female with PMH sig for allergic rhinitis who presents with a c/co of abdominal pain. GI      Patient and/or patient's family given opportunity to ask questions and note understanding and agree with therapeutic plan as outlined    Shad Lee DO  [facility] Hospitalist  Answering service: [phone]      Subjective     +flatus, tolerating FLD be 3.6  3.4  3.4   CL  108  106  103   CO2  25  27  27       Recent Labs   Lab  09/19/18   0112  09/20/18   0628  09/22/18   0545  09/23/18   0641  09/24/18   0506   WBC  4.3  6.7  5.9  5.8  5.6   HGB  8.7*  8.3*  8.0*  8.4*  8.1*   MCV  95.7  96.1  94.9  96 Date: 9/19/2018  CONCLUSION:   1. There is an umbilical hernia containing fat and a short segment of small bowel. This results in gas and fluid distention of small bowel proximal to the hernia, and distal decompression.  Findings are suspicious for incarcer

## 2018-09-24 NOTE — PROCEDURES
[facility]  Procedure Note    Gray Caban Patient Status:  Inpatient    1950 MRN [id number]   Location [facility] 4W/SW/SE Attending Mayco Mcdonald MD   Hosp Day # 5 PCP Kaci Bermudez MD     Procedure: US guide

## 2018-09-24 NOTE — IMAGING NOTE
0740 PT TO ULTRASOUND ROOM  SCANS COMPLETED BY MATTIE CH  0800 HX TAKEN PROCEDURE EXPLAINED QUESTIONS ANSWERED. PT CONSENTED AT 0812    PT TO GET ALBUMIN 25% 25 GRAM  NO      ALETHEA MESA   HERE, SCANNING COMPLETED.   PLATELETS = 357

## 2018-09-25 PROCEDURE — 85025 COMPLETE CBC W/AUTO DIFF WBC: CPT | Performed by: HOSPITALIST

## 2018-09-25 PROCEDURE — 86927 PLASMA FRESH FROZEN: CPT

## 2018-09-25 PROCEDURE — 80048 BASIC METABOLIC PNL TOTAL CA: CPT | Performed by: HOSPITALIST

## 2018-09-25 NOTE — PROGRESS NOTES
[facility] HOSP - [facility]    Progress Note    Torin Bad Patient Status:  Inpatient    1950 MRN [id number]   Location [facility] 4W/SW/SE Attending Ernesto Logan MD   Hosp Day # 6 PCP Harini Chan MD            Subjective: 143*   < >  117*  110*   --   99   BUN  8   < >  3*  3*   --   2*   CREATSERUM  0.56   < >  0.47*  0.59   --   0.54   GFRAA  >60   < >  >60  >60   --   >60   GFRNAA  >60   < >  >60  >60   --   >60   CA  8.7   < >  8.2*  8.2*   --   8.5   ALB  3.4*   --

## 2018-09-25 NOTE — PROGRESS NOTES
[facility] Hospitalist Progress note       CC: Patient presents with:  Hernia     CC; follow up    PCP: Elizabeth Johnson MD    ASSESSMENT / PLAN:   Ms. Anni Brennan is a 78year old female with PMH sig for allergic rhinitis who presents with a c/co of abdominal pain. GI    FEN:  - IVF:d/c  - Diet: low sodium diet  - Lytes:in am    DVT Prophy:hsq  Dispo: pending timing of liver biopsy and pt/ot evals      Patient and/or patient's family given opportunity to ask questions and note understanding and agree with therapeutic 99   BUN  3*  3*   --   2*   CREATSERUM  0.47*  0.59   --   0.54   GFRAA  >60  >60   --   >60   GFRNAA  >60  >60   --   >60   CA  8.2*  8.2*   --   8.5   NA  136  134*   --   136   K  3.6  3.6  3.4  3.4  4.5  4.3   CL  106  103   --   104   CO2  27  27 9/24/2018 at 9:31          Xr Abdomen 2 Views(cpt=74019)    Result Date: 9/22/2018  CONCLUSION:  1. Diffuse ileus.      Dictated by (CST): Disha Taylor MD on 9/22/2018 at 8:59     Approved by (CST): Disha Taylor MD on 9/22/2018 at 9:01

## 2018-09-25 NOTE — PROGRESS NOTES
[facility]  Gastroenterology Progress Note    Dayan Ramirez Patient Status:  Inpatient    1950 MRN [id number]   Location [facility] 4W/SW/SE Attending Stewart Hu, DO   Hosp Day # 6 PCP Sweta Amaya MD     Subjective:  Jamir Okeefe component.     -Still pending AMA and AMSA ab. Other serologies negative. If negative may suspect PEREZ, however given uncertainty will obtain transjugular liver biopsy  -s/p LVP 9/24, suggestive of portal HTN, non-cardiac, no SBP.  Likely 2/2 underlying cir

## 2018-09-26 ENCOUNTER — APPOINTMENT (OUTPATIENT)
Dept: INTERVENTIONAL RADIOLOGY/VASCULAR | Facility: HOSPITAL | Age: 68
DRG: 354 | End: 2018-09-26
Attending: INTERNAL MEDICINE
Payer: COMMERCIAL

## 2018-09-26 ENCOUNTER — APPOINTMENT (OUTPATIENT)
Dept: GENERAL RADIOLOGY | Facility: HOSPITAL | Age: 68
DRG: 354 | End: 2018-09-26
Attending: HOSPITALIST
Payer: COMMERCIAL

## 2018-09-26 PROCEDURE — 74019 RADEX ABDOMEN 2 VIEWS: CPT | Performed by: HOSPITALIST

## 2018-09-26 PROCEDURE — 36011 PLACE CATHETER IN VEIN: CPT

## 2018-09-26 PROCEDURE — A4216 STERILE WATER/SALINE, 10 ML: HCPCS | Performed by: SURGERY

## 2018-09-26 PROCEDURE — 88313 SPECIAL STAINS GROUP 2: CPT | Performed by: INTERNAL MEDICINE

## 2018-09-26 PROCEDURE — 0FB03ZX EXCISION OF LIVER, PERCUTANEOUS APPROACH, DIAGNOSTIC: ICD-10-PCS | Performed by: RADIOLOGY

## 2018-09-26 PROCEDURE — 99152 MOD SED SAME PHYS/QHP 5/>YRS: CPT

## 2018-09-26 PROCEDURE — 80048 BASIC METABOLIC PNL TOTAL CA: CPT | Performed by: HOSPITALIST

## 2018-09-26 PROCEDURE — 37200 TRANSCATHETER BIOPSY: CPT

## 2018-09-26 PROCEDURE — 88307 TISSUE EXAM BY PATHOLOGIST: CPT | Performed by: INTERNAL MEDICINE

## 2018-09-26 PROCEDURE — 75970 VASCULAR BIOPSY: CPT

## 2018-09-26 PROCEDURE — A4216 STERILE WATER/SALINE, 10 ML: HCPCS

## 2018-09-26 PROCEDURE — 85610 PROTHROMBIN TIME: CPT | Performed by: INTERNAL MEDICINE

## 2018-09-26 PROCEDURE — 85025 COMPLETE CBC W/AUTO DIFF WBC: CPT | Performed by: HOSPITALIST

## 2018-09-26 PROCEDURE — 99153 MOD SED SAME PHYS/QHP EA: CPT

## 2018-09-26 PROCEDURE — 80076 HEPATIC FUNCTION PANEL: CPT | Performed by: INTERNAL MEDICINE

## 2018-09-26 PROCEDURE — 83735 ASSAY OF MAGNESIUM: CPT | Performed by: HOSPITALIST

## 2018-09-26 RX ORDER — LIDOCAINE HYDROCHLORIDE 20 MG/ML
INJECTION, SOLUTION EPIDURAL; INFILTRATION; INTRACAUDAL; PERINEURAL
Status: COMPLETED
Start: 2018-09-26 | End: 2018-09-26

## 2018-09-26 RX ORDER — MAGNESIUM SULFATE HEPTAHYDRATE 40 MG/ML
2 INJECTION, SOLUTION INTRAVENOUS ONCE
Status: COMPLETED | OUTPATIENT
Start: 2018-09-26 | End: 2018-09-26

## 2018-09-26 RX ORDER — HYDROCODONE BITARTRATE AND ACETAMINOPHEN 7.5; 325 MG/1; MG/1
TABLET ORAL
Status: COMPLETED
Start: 2018-09-26 | End: 2018-09-26

## 2018-09-26 RX ORDER — TRAMADOL HYDROCHLORIDE 50 MG/1
50 TABLET ORAL EVERY 6 HOURS PRN
Status: DISCONTINUED | OUTPATIENT
Start: 2018-09-26 | End: 2018-09-30

## 2018-09-26 RX ORDER — 0.9 % SODIUM CHLORIDE 0.9 %
VIAL (ML) INJECTION
Status: COMPLETED
Start: 2018-09-26 | End: 2018-09-26

## 2018-09-26 RX ORDER — MIDAZOLAM HYDROCHLORIDE 1 MG/ML
INJECTION INTRAMUSCULAR; INTRAVENOUS
Status: COMPLETED
Start: 2018-09-26 | End: 2018-09-26

## 2018-09-26 NOTE — PHYSICAL THERAPY NOTE
Chart reviewed      Therapy attempt in late AM at scheduled time    Pt is at procedure  And not available      Therapy will reattempt later in day as schedule allows  And pt available

## 2018-09-26 NOTE — PROGRESS NOTES
Procedure hand off report given to Akosha. Pt's vital signs are stable. Procedural access site is dry and intact with no signs and symptoms of bleeding and hematoma.

## 2018-09-26 NOTE — DIETARY NOTE
ADULT NUTRITION INITIAL ASSESSMENT    Pt is at moderate nutrition risk. Pt does not meet malnutrition criteria.       RECOMMENDATIONS TO MD:  See Nutrition Intervention     NUTRITION DIAGNOSIS/PROBLEM:  Inadequate oral intake related to decreased ability oz)  03/02/18 : 83.5 kg (184 lb)  02/16/18 : 84.1 kg (185 lb 8 oz)  10/24/17 : 81.8 kg (180 lb 6.4 oz)  10/23/17 : 77.1 kg (170 lb)  09/25/17 : 79.4 kg (175 lb)      GASTROINTESTINAL: nausea-resolved with antiemetic    FOOD/NUTRITION RELATED HISTORY:  Appe

## 2018-09-26 NOTE — PROGRESS NOTES
[facility] Hospitalist Progress note       CC: Patient presents with:  Hernia     CC; follow up    PCP: Kat Prieto MD    ASSESSMENT / PLAN:   Ms. Dayanara Ruffin is a 78year old female with PMH sig for allergic rhinitis who presents with a c/co of abdominal pain. acute blood loss anemia post op  -will need egd/colon, likely as o/p per d/w GI    FEN:  - IVF:d/c  - Diet: low sodium diet  - Lytes:in am    DVT Prophy:SCD,  Dispo: pending timing of liver biopsy and pt/ot evals      Patient and/or patient's family given 09/25/18   0553  09/26/18   0544   GLU  110*   --   99  98   BUN  3*   --   2*  3*   CREATSERUM  0.59   --   0.54  0.60   GFRAA  >60   --   >60  >60   GFRNAA  >60   --   >60  >60   CA  8.2*   --   8.5  8.6   NA  134*   --   136  135*   K  3.4  3.4  4.5  4. 9/24/2018 at 9:30     Approved by (CST): Sd Daniel MD on 9/24/2018 at 9:31          Xr Abdomen 2 Views(cpt=74019)    Result Date: 9/22/2018  CONCLUSION:  1. Diffuse ileus.      Dictated by (CST): Maryann Roberto MD on 9/22/2018 at 8:59

## 2018-09-26 NOTE — CM/SW NOTE
09/26/18 1400   CM/SW Referral Data   Referral Source    Reason for Referral Discharge planning   Informant Patient   Pertinent Medical Hx   Primary Care Physician Name (Dr. Wendi Serrano)   Patient Info   Patient's Mental Status Alert;Oriented

## 2018-09-26 NOTE — PHYSICAL THERAPY NOTE
PM attempt x 2      Discussed pt with RN         Pt is on bedrest post procedure for next 3 hours--sedation also used for procedure          PT will follow up tomorrow AM for PT olga

## 2018-09-27 PROCEDURE — 80053 COMPREHEN METABOLIC PANEL: CPT | Performed by: HOSPITALIST

## 2018-09-27 PROCEDURE — 83735 ASSAY OF MAGNESIUM: CPT | Performed by: HOSPITALIST

## 2018-09-27 PROCEDURE — 97530 THERAPEUTIC ACTIVITIES: CPT

## 2018-09-27 PROCEDURE — 97162 PT EVAL MOD COMPLEX 30 MIN: CPT

## 2018-09-27 PROCEDURE — 85610 PROTHROMBIN TIME: CPT | Performed by: HOSPITALIST

## 2018-09-27 PROCEDURE — 85025 COMPLETE CBC W/AUTO DIFF WBC: CPT | Performed by: HOSPITALIST

## 2018-09-27 RX ORDER — HYDROCODONE BITARTRATE AND ACETAMINOPHEN 10; 325 MG/1; MG/1
1 TABLET ORAL EVERY 4 HOURS PRN
Status: DISCONTINUED | OUTPATIENT
Start: 2018-09-27 | End: 2018-09-30

## 2018-09-27 RX ORDER — PANTOPRAZOLE SODIUM 40 MG/1
40 TABLET, DELAYED RELEASE ORAL
Status: DISCONTINUED | OUTPATIENT
Start: 2018-09-27 | End: 2018-09-30

## 2018-09-27 RX ORDER — GABAPENTIN 300 MG/1
300 CAPSULE ORAL 3 TIMES DAILY
Status: DISCONTINUED | OUTPATIENT
Start: 2018-09-27 | End: 2018-09-27

## 2018-09-27 RX ORDER — MONTELUKAST SODIUM 10 MG/1
10 TABLET ORAL NIGHTLY
Status: DISCONTINUED | OUTPATIENT
Start: 2018-09-27 | End: 2018-09-30

## 2018-09-27 RX ORDER — POLYETHYLENE GLYCOL 3350 17 G/17G
17 POWDER, FOR SOLUTION ORAL DAILY PRN
Status: DISCONTINUED | OUTPATIENT
Start: 2018-09-27 | End: 2018-09-30

## 2018-09-27 RX ORDER — HEPARIN SODIUM 5000 [USP'U]/ML
5000 INJECTION, SOLUTION INTRAVENOUS; SUBCUTANEOUS EVERY 12 HOURS SCHEDULED
Status: DISCONTINUED | OUTPATIENT
Start: 2018-09-27 | End: 2018-09-30

## 2018-09-27 RX ORDER — BISACODYL 10 MG
10 SUPPOSITORY, RECTAL RECTAL
Status: DISCONTINUED | OUTPATIENT
Start: 2018-09-27 | End: 2018-09-30

## 2018-09-27 NOTE — PROGRESS NOTES
[facility]  Gastroenterology Progress Note    Saray Clamp Patient Status:  Inpatient    1950 MRN [id number]   Location [facility] 4W/SW/SE Attending Renetta Anna MD   Hosp Day # 8 PCP Roz Roca MD     Subjective:  Edwin Tang with ascites. Serologies and eventually biopsy obtained, path c/w PEREZ. Also noted to have concurrent iron deficient anemia. Likely decompensated 2/2 emergent surgery    -LVP on 9/24 c/w portal HTN, non-cardiac, no SBP.  Started on aldactone 50mg and lasix

## 2018-09-27 NOTE — PHYSICAL THERAPY NOTE
PHYSICAL THERAPY EVALUATION - INPATIENT     Room Number: 458/458-A  Evaluation Date: 9/27/2018  Type of Evaluation: Initial   Physician Order: PT Eval and Treat    Presenting Problem: umbilical hernia incarcerated    pt is s/p 9/19  abd sx for hernia repa SBA overall , cues for upright posture and proper use of AD. Stairs training with pt emphasis on safety . Pt able to up down 4 steps with rails overall SBA to CGA provided. Pt present status is  below the patient's pre-admission status.   Pt requested r op pain  -XR with ileus  -CLD->FLD->general low sodium diet  -prn anti-emetics  -xr with ileus but now passing gas on 9/23, looked more distended this morning stat abd xray ordered showed improving ileus   -as per surgery     Back pain  -chronic issue, dmitriy Aminta Lynch MD at [address] [place] [address] MAIN OR  No date: HYSTERECTOMY  9/19/2018: SMALL BOWEL OBSTRUCTION RELEASE; N/A      Comment:  Performed by Aminta Lynch MD at [place] [address]  Type of Home: House   Home Layout: One level  Stairs to Enter : 3  Rail bedclothes, sheets and blankets)?: A Little   -   Sitting down on and standing up from a chair with arms (e.g., wheelchair, bedside commode, etc.): A Little   -   Moving from lying on back to sitting on the side of the bed?: A Little   How much help from a Pt to verbalize post sx abd sx precautions and maintain with all fxn mobility with noted improved safety awareness overall    Goal #5   Current Status    Goal #6    Goal #6  Current Status

## 2018-09-27 NOTE — PROGRESS NOTES
[facility] Hospitalist Progress note       CC: Patient presents with:  Hernia     CC; follow up    PCP: Harini Chan MD    ASSESSMENT / PLAN:   Ms. Ruth Ann Fuentes is a 78year old female with PMH sig for allergic rhinitis who presents with a c/co of abdominal pain. known  -follow here  -iron studies c/w MICHELE  -monitor, 8.7->8.3->8.0->8.4->8.1->8.2, component of acute blood loss anemia post op  -will need egd/colon, likely as o/p per d/w GI    FEN:  - IVF:d/c  - Diet: low sodium diet  - Lytes:in am    DVT Prophy:SCD, h Labs   Lab  09/25/18   0553  09/26/18   0544  09/27/18   0532   GLU  99  98  110*   BUN  2*  3*  5*   CREATSERUM  0.54  0.60  0.54   GFRAA  >60  >60  >60   GFRNAA  >60  >60  >60   CA  8.5  8.6  8.5   NA  136  135*  136   K  4.3  4.7  4.4   CL  104  102  10 9/24/2018  CONCLUSION:  1. Diffuse ascites seen.      Dictated by (CST): Umesh Wilde MD on 9/24/2018 at 9:30     Approved by (CST): Umesh Wilde MD on 9/24/2018 at 9:31          Xr Abdomen 2 Views(cpt=74019)    Result Date: 9/22/2018  CONCLUSIO

## 2018-09-27 NOTE — CM/SW NOTE
MD orders received regarding HHC. Referral has been made to [facility]. HHC orders have been entered. Possible discharge home tomorrow if medically stable.       Irma Bess, [facility] ext [phone]

## 2018-09-27 NOTE — PROGRESS NOTES
[facility]    Progress Note    Climmie Valentine Patient Status:  Inpatient    1950 MRN [id number]   Location [facility] 4W/SW/SE Attending Aston Alegria MD   Hosp Day # 8 PCP Sujey Lama MD            Subjective: 09/24/18   0506   09/25/18   0553  09/26/18   0544  09/27/18   0532   GLU  110*   --   99  98  110*   BUN  3*   --   2*  3*  5*   CREATSERUM  0.59   --   0.54  0.60  0.54   GFRAA  >60   --   >60  >60  >60   GFRNAA  >60   --   >60  >60  >60   CA  8.2*   --

## 2018-09-27 NOTE — PROGRESS NOTES
[facility]    Progress Note    Son Mead Patient Status:  Inpatient    1950 MRN [id number]   Location [facility] 4W/SW/SE Attending Ivan Pacheco MD   Hosp Day # 7 PCP Irvin Mckeon MD            Subjective: 09/26/18   0544   GLU  110*   --   99  98   BUN  3*   --   2*  3*   CREATSERUM  0.59   --   0.54  0.60   GFRAA  >60   --   >60  >60   GFRNAA  >60   --   >60  >60   CA  8.2*   --   8.5  8.6   ALB  2.6*   --    --   2.6*   NA  134*   --   136  135*   K  3.4

## 2018-09-28 ENCOUNTER — APPOINTMENT (OUTPATIENT)
Dept: ULTRASOUND IMAGING | Facility: HOSPITAL | Age: 68
DRG: 354 | End: 2018-09-28
Attending: INTERNAL MEDICINE
Payer: COMMERCIAL

## 2018-09-28 ENCOUNTER — APPOINTMENT (OUTPATIENT)
Dept: ULTRASOUND IMAGING | Facility: HOSPITAL | Age: 68
DRG: 354 | End: 2018-09-28
Attending: CLINICAL NURSE SPECIALIST
Payer: COMMERCIAL

## 2018-09-28 PROCEDURE — 83735 ASSAY OF MAGNESIUM: CPT | Performed by: HOSPITALIST

## 2018-09-28 PROCEDURE — 49083 ABD PARACENTESIS W/IMAGING: CPT | Performed by: CLINICAL NURSE SPECIALIST

## 2018-09-28 PROCEDURE — 87070 CULTURE OTHR SPECIMN AEROBIC: CPT | Performed by: CLINICAL NURSE SPECIALIST

## 2018-09-28 PROCEDURE — 80053 COMPREHEN METABOLIC PANEL: CPT | Performed by: HOSPITALIST

## 2018-09-28 PROCEDURE — 80061 LIPID PANEL: CPT | Performed by: HOSPITALIST

## 2018-09-28 PROCEDURE — 85610 PROTHROMBIN TIME: CPT | Performed by: HOSPITALIST

## 2018-09-28 PROCEDURE — 0W9G3ZZ DRAINAGE OF PERITONEAL CAVITY, PERCUTANEOUS APPROACH: ICD-10-PCS | Performed by: CLINICAL NURSE SPECIALIST

## 2018-09-28 PROCEDURE — 89051 BODY FLUID CELL COUNT: CPT | Performed by: CLINICAL NURSE SPECIALIST

## 2018-09-28 PROCEDURE — 85025 COMPLETE CBC W/AUTO DIFF WBC: CPT | Performed by: HOSPITALIST

## 2018-09-28 PROCEDURE — 76705 ECHO EXAM OF ABDOMEN: CPT | Performed by: INTERNAL MEDICINE

## 2018-09-28 PROCEDURE — 89050 BODY FLUID CELL COUNT: CPT | Performed by: CLINICAL NURSE SPECIALIST

## 2018-09-28 PROCEDURE — 87205 SMEAR GRAM STAIN: CPT | Performed by: CLINICAL NURSE SPECIALIST

## 2018-09-28 RX ORDER — MAGNESIUM OXIDE 400 MG (241.3 MG MAGNESIUM) TABLET
400 TABLET ONCE
Status: COMPLETED | OUTPATIENT
Start: 2018-09-28 | End: 2018-09-28

## 2018-09-28 RX ORDER — LIDOCAINE 50 MG/G
2 PATCH TOPICAL DAILY
Qty: 60 PATCH | Refills: 0 | Status: ON HOLD | OUTPATIENT
Start: 2018-09-29 | End: 2019-01-01

## 2018-09-28 RX ORDER — SPIRONOLACTONE 50 MG/1
50 TABLET, FILM COATED ORAL DAILY
Qty: 30 TABLET | Refills: 1 | Status: SHIPPED | OUTPATIENT
Start: 2018-09-29 | End: 2019-02-25

## 2018-09-28 RX ORDER — FUROSEMIDE 40 MG/1
40 TABLET ORAL DAILY
Status: DISCONTINUED | OUTPATIENT
Start: 2018-09-29 | End: 2018-09-30

## 2018-09-28 RX ORDER — FUROSEMIDE 20 MG/1
20 TABLET ORAL DAILY
Qty: 30 TABLET | Refills: 1 | Status: SHIPPED | OUTPATIENT
Start: 2018-09-29 | End: 2018-09-28

## 2018-09-28 RX ORDER — FUROSEMIDE 40 MG/1
40 TABLET ORAL DAILY
Qty: 30 TABLET | Refills: 1 | Status: SHIPPED | OUTPATIENT
Start: 2018-09-29 | End: 2018-10-25 | Stop reason: DRUGHIGH

## 2018-09-28 NOTE — PLAN OF CARE
Problem: Patient/Family Goals  Goal: Patient/Family Long Term Goal  Patient's Long Term Goal: go home with family    Interventions:  - Surgery  -IV fluids and IV abx  - pain management  - encouraged ambulation  - See additional Care Plan goals for specific Establish a toileting routine/schedule  - Consider collaborating with pharmacy to review patient's medication profile  Outcome: Progressing      Problem: GENITOURINARY - ADULT  Goal: Absence of urinary retention  INTERVENTIONS:  - Assess patient’s ability patient to participate in ADLs to maximize function  - Promote sitting position while performing ADLs such as feeding, grooming, and bathing  - Educate and encourage patient/family in tolerated functional activity level and precautions during self-care

## 2018-09-28 NOTE — PROCEDURES
[place] [facility] - [facility]  Brief IR Post-Procedure Note    Saray Clamp  PT Status Inpatient    1950 MRN [id number]   Location [facility] 4W/SW/SE Attending Jocelyn Valdovinos DO      Hosp Day # 9    PCP Roz Roca MD     Procedure(s):

## 2018-09-28 NOTE — IMAGING NOTE
1316 PT TO ULTRASOUND ROOM  SCANS COMPLETED BY US TECH     TAKEN PROCEDURE EXPLAINED QUESTIONS ANSWERED. PT CONSENTED  9/28/2018 @ 1130 ON THE FLOOR    PT TO GET ALBUMIN 25% 25 GRAM  NO       ALETHEA MESA HERE, SCANNING COMPLETED.   PLATEL

## 2018-09-28 NOTE — PROGRESS NOTES
[facility] Hospitalist Progress note       CC: Patient presents with:  Hernia     CC; follow up    PCP: Paul King MD    ASSESSMENT / PLAN:   Ms. Mac Best is a 78year old female with PMH sig for allergic rhinitis who presents with a c/co of abdominal pain. deficiency  -hgb baseline not known  -follow here  -iron studies c/w MICHELE  -monitor, 8.7->8.3->8.0->8.4->8.1->8.2, component of acute blood loss anemia post op  -will need egd/colon, likely as o/p per d/w GI    FEN:  - IVF:d/c  - Diet: low sodium diet  - Ly 32.1*  32.3*   MCHC  33.8  33.9  33.9   RDW  15.4*  15.4*  15.6*   WBC  5.8  5.0  4.7   PLT  167  151  146         Recent Labs   Lab  09/26/18   0544  09/27/18   0532  09/28/18   0556   GLU  98  110*  101*   BUN  3*  5*  6*   CREATSERUM  0.60  0.54  0.56 9/24/2018 at 15:15          Us Ascites (cpt=76705)    Result Date: 9/28/2018  CONCLUSION:  1.  Continued ascites with the largest collection in the right lower quadrant     Dictated by (CST): Veronica Villagomez MD on 9/28/2018 at 12:14     Approved by (CST) 9/26/2018  CONCLUSION:  1. Resolving ileus.      Dictated by (CST): Jj Cr MD on 9/26/2018 at 10:46     Approved by (CST): Jennifer Briseno MD on 9/26/2018 at 10:48

## 2018-09-28 NOTE — PROGRESS NOTES
[facility]  Gastroenterology Progress Note    Sally Powell Patient Status:  Inpatient    1950 MRN [id number]   Location [facility] 4W/SW/SE Attending Smooth Espino DO   Hosp Day # 9 PCP Ly Jenkins MD     Subjective:  Vandana Hong eventually biopsy obtained, path c/w PEREZ. Also noted to have concurrent iron deficient anemia. Likely decompensated 2/2 emergent surgery     -LVP on 9/24 c/w portal HTN, non-cardiac, no SBP. Started on aldactone 50mg and lasix 20mg, tolerating currently.

## 2018-09-29 PROCEDURE — 85025 COMPLETE CBC W/AUTO DIFF WBC: CPT | Performed by: HOSPITALIST

## 2018-09-29 PROCEDURE — 83735 ASSAY OF MAGNESIUM: CPT | Performed by: HOSPITALIST

## 2018-09-29 PROCEDURE — 80048 BASIC METABOLIC PNL TOTAL CA: CPT | Performed by: HOSPITALIST

## 2018-09-29 PROCEDURE — A4216 STERILE WATER/SALINE, 10 ML: HCPCS | Performed by: HOSPITALIST

## 2018-09-29 RX ORDER — HYDROCODONE BITARTRATE AND ACETAMINOPHEN 10; 325 MG/1; MG/1
1 TABLET ORAL 2 TIMES DAILY PRN
Qty: 20 TABLET | Refills: 0 | Status: SHIPPED | OUTPATIENT
Start: 2018-09-29 | End: 2018-10-04

## 2018-09-29 RX ORDER — MAGNESIUM OXIDE 400 MG (241.3 MG MAGNESIUM) TABLET
400 TABLET ONCE
Status: COMPLETED | OUTPATIENT
Start: 2018-09-29 | End: 2018-09-29

## 2018-09-29 NOTE — PLAN OF CARE
GASTROINTESTINAL - ADULT    • Minimal or absence of nausea and vomiting Completed    • Maintains or returns to baseline bowel function Completed        GENITOURINARY - ADULT    • Absence of urinary retention Completed        Impaired Activities of Daily Heather Mack

## 2018-09-29 NOTE — PROGRESS NOTES
[facility] Hospitalist Progress note       CC: Patient presents with:  Hernia     CC; follow up    PCP: Eulalia Riley MD    ASSESSMENT / PLAN:   Ms. Nilam Alvarado is a 78year old female with PMH sig for allergic rhinitis who presents with a c/co of abdominal pain. on 9/28 with 2L fluid removed  -serologies ordered per GI, thus far anti-sm and liver/kidney microsomal ab neg  -aldactone ordered, po lasix started, increased lasix dose  -d/w GI, s/p Liver biopsy on 9/26 path d/w cirrhosis with NSASH    Anemia, iron defi extremity edema  Skin: no rashes or lesions, well perfused  Psych: mood stable, cooperative  Neuro: no focal deficits      Diagnostic Data:    CBC/Chem    Recent Labs   Lab  09/27/18   0532  09/28/18   0556  09/29/18   0518   RBC  2.59*  2.48*  2.59*   HGB Approved by (CST): Jose Menendez on 9/28/2018 at 15:14          Us Paracentesis Abdomen W Imaging  (FHB=65208)    Result Date: 9/24/2018  CONCLUSION: Ultrasound guided paracentesis was performed.       Dictated by (CST): Jose Menendez on 9/24/2018 a preliminary report was provided by [facility] and there is no significant discrepancy.   Dictated by (CST): Lula Shirley MD on 9/19/2018 at 6:38     Approved by (CST): Lula Shirley MD on 9/19/2018 at 6:49          Xr Abdomen Obstructive Seri

## 2018-09-29 NOTE — HOME CARE LIAISON
Met with pt in her room to discuss home health services. She is in agreement with plan. Brochure provided, questions answered.

## 2018-09-29 NOTE — PROGRESS NOTES
Patient doing well. Patient was drained again yesterday. More comfortable. Able to take p.o. though not large amount. Abdomen soft nontender. Small amount of fluid underneath the wound. This is stable. No evidence of any leak.     Instructions gi

## 2018-09-29 NOTE — PROGRESS NOTES
[facility] AND [facility]  Gastroenterology Progress Note    Rickeyjanneth Grijalva Patient Status:  Inpatient    1950 MRN [id number]   Location [facility] 4W/SW/SE Attending Shahram Craig DO   Hosp Day # 10 PCP Sujey Lama MD     Subjective:  Dasha Lott Anemia     Hyperglycemia     Umbilical hernia with obstruction, without gangrene     Other ascites      Assessment/Plan:  Ms Sadia Wilkes is a 79 y/o female that presented with incarcerated hernia s/p repair on 9/19.  She was noted to have underlying cirrhosis wit

## 2018-09-30 VITALS
TEMPERATURE: 98 F | OXYGEN SATURATION: 96 % | WEIGHT: 177 LBS | HEART RATE: 76 BPM | HEIGHT: 67 IN | RESPIRATION RATE: 18 BRPM | SYSTOLIC BLOOD PRESSURE: 112 MMHG | BODY MASS INDEX: 27.78 KG/M2 | DIASTOLIC BLOOD PRESSURE: 47 MMHG

## 2018-09-30 PROCEDURE — 90471 IMMUNIZATION ADMIN: CPT

## 2018-09-30 PROCEDURE — 83735 ASSAY OF MAGNESIUM: CPT | Performed by: HOSPITALIST

## 2018-09-30 PROCEDURE — 80048 BASIC METABOLIC PNL TOTAL CA: CPT | Performed by: HOSPITALIST

## 2018-09-30 PROCEDURE — 3E0234Z INTRODUCTION OF SERUM, TOXOID AND VACCINE INTO MUSCLE, PERCUTANEOUS APPROACH: ICD-10-PCS | Performed by: HOSPITALIST

## 2018-09-30 NOTE — PHYSICAL THERAPY NOTE
Attempted to see pt for PT treatment session today, pt declined at this time stating she is being discharged home later and would like to save her energy for that activity.  Pt stated she is comfortable with her performance with gait and stairs during last

## 2018-09-30 NOTE — PROGRESS NOTES
[facility]  Gastroenterology Progress Note    [facility] Patient Status:  Inpatient    1950 MRN [id number]   Location [facility] 4W/SW/SE Attending Dion Montgomery DO   Hosp Day # 6 PCP Juan Morales MD     Subjective:  [place] (not aldactone given higher K levels to 5.0) following second LVP. Will titrate as outpatient.  Follow up one week and will check BMP and fluid status  -No signs of HE  -No lesions noted on recent CT scan  -Will need EGD and colonoscopy for underlying MICHELE,

## 2018-09-30 NOTE — PROGRESS NOTES
Pt adequate for discharge per order. IV removed. Surgical incision CDI. Dressing changed per order. Instructions reviewed with pt. Pt escorted safely off the unit with all personal belongings.

## 2018-09-30 NOTE — DISCHARGE SUMMARY
[facility] Hospitalist Discharge Summary   Patient ID:  Son Mead  [id number]  89 year old  12/12/1950    Admit date: 9/19/2018  Discharge date: 9/30/2018  Primary Care Physician: Irvin Mckeon MD   Attending Physician: No att. providers found   Consults issue, prior trauma/fracture/surgery  -added lidocaine patch, stopped IV narcotics  -patient with likely opiate dependency, recommended reduction in use with eventual DC as outpatient  -some worsening inpatient due to decreased activity level, increase act 9/28/2018  CONCLUSION: Ultrasound guided paracentesis was performed.       Dictated by (CST): Jessy Menendez [place] on 9/28/2018 at 15:13     Approved by (CST): Jessy Menendez [place]lyn on 9/28/2018 at 15:14          Us Ascites (cpt=76705)    Result Date: 9/28/2018  C medications from any pharmacy    Bring a paper prescription for each of these medications  · furosemide 40 MG Tabs  · HYDROcodone-acetaminophen  MG Tabs  · lidocaine 5 % Ptch  · spironolactone 50 MG Tabs         Wound Care: NA  Code Status: No Order

## 2018-09-30 NOTE — PLAN OF CARE
Patient with no acute changes overnight. [place] hs. Up to bathroom with assist and walker. Surgical sites CDI. Vital signs stable. No BM overnight. Plan for home today.

## 2018-10-04 ENCOUNTER — LAB ENCOUNTER (OUTPATIENT)
Dept: LAB | Facility: HOSPITAL | Age: 68
End: 2018-10-04
Attending: INTERNAL MEDICINE
Payer: COMMERCIAL

## 2018-10-04 DIAGNOSIS — K75.81 LIVER CIRRHOSIS SECONDARY TO NASH (HCC): ICD-10-CM

## 2018-10-04 DIAGNOSIS — K74.60 LIVER CIRRHOSIS SECONDARY TO NASH (HCC): ICD-10-CM

## 2018-10-04 DIAGNOSIS — R18.8 OTHER ASCITES: ICD-10-CM

## 2018-10-04 DIAGNOSIS — N39.0 RECURRENT UTI: ICD-10-CM

## 2018-10-04 PROCEDURE — 85025 COMPLETE CBC W/AUTO DIFF WBC: CPT

## 2018-10-04 PROCEDURE — 85610 PROTHROMBIN TIME: CPT

## 2018-10-04 PROCEDURE — 36415 COLL VENOUS BLD VENIPUNCTURE: CPT

## 2018-10-04 PROCEDURE — 80053 COMPREHEN METABOLIC PANEL: CPT

## 2018-10-04 PROCEDURE — 87086 URINE CULTURE/COLONY COUNT: CPT

## 2018-10-12 ENCOUNTER — APPOINTMENT (OUTPATIENT)
Dept: LAB | Facility: HOSPITAL | Age: 68
End: 2018-10-12
Attending: INTERNAL MEDICINE
Payer: COMMERCIAL

## 2018-10-12 DIAGNOSIS — R18.8 CIRRHOSIS OF LIVER WITH ASCITES, UNSPECIFIED HEPATIC CIRRHOSIS TYPE (HCC): ICD-10-CM

## 2018-10-12 DIAGNOSIS — K74.60 CIRRHOSIS OF LIVER WITH ASCITES, UNSPECIFIED HEPATIC CIRRHOSIS TYPE (HCC): ICD-10-CM

## 2018-10-12 PROCEDURE — 80048 BASIC METABOLIC PNL TOTAL CA: CPT

## 2018-10-12 PROCEDURE — 36415 COLL VENOUS BLD VENIPUNCTURE: CPT

## 2018-10-23 PROBLEM — K42.0 UMBILICAL HERNIA WITH OBSTRUCTION, WITHOUT GANGRENE: Status: RESOLVED | Noted: 2018-09-19 | Resolved: 2018-10-23

## 2018-10-25 PROCEDURE — 82140 ASSAY OF AMMONIA: CPT | Performed by: INTERNAL MEDICINE

## 2018-10-26 ENCOUNTER — HOSPITAL ENCOUNTER (OUTPATIENT)
Facility: HOSPITAL | Age: 68
Setting detail: HOSPITAL OUTPATIENT SURGERY
Discharge: HOME | End: 2018-10-26
Attending: INTERNAL MEDICINE | Admitting: INTERNAL MEDICINE
Payer: COMMERCIAL

## 2018-10-26 ENCOUNTER — ANESTHESIA EVENT (OUTPATIENT)
Dept: ENDOSCOPY | Facility: HOSPITAL | Age: 68
End: 2018-10-26
Payer: COMMERCIAL

## 2018-10-26 ENCOUNTER — ANESTHESIA (OUTPATIENT)
Dept: ENDOSCOPY | Facility: HOSPITAL | Age: 68
End: 2018-10-26
Payer: COMMERCIAL

## 2018-10-26 DIAGNOSIS — I85.00 ESOPHAGEAL VARICES WITHOUT BLEEDING, UNSPECIFIED ESOPHAGEAL VARICES TYPE (HCC): Primary | ICD-10-CM

## 2018-10-26 DIAGNOSIS — D50.9 IRON DEFICIENCY ANEMIA, UNSPECIFIED IRON DEFICIENCY ANEMIA TYPE: ICD-10-CM

## 2018-10-26 PROCEDURE — 88312 SPECIAL STAINS GROUP 1: CPT | Performed by: INTERNAL MEDICINE

## 2018-10-26 PROCEDURE — 0DJD8ZZ INSPECTION OF LOWER INTESTINAL TRACT, VIA NATURAL OR ARTIFICIAL OPENING ENDOSCOPIC: ICD-10-PCS | Performed by: INTERNAL MEDICINE

## 2018-10-26 PROCEDURE — 88305 TISSUE EXAM BY PATHOLOGIST: CPT | Performed by: INTERNAL MEDICINE

## 2018-10-26 PROCEDURE — 0DB98ZX EXCISION OF DUODENUM, VIA NATURAL OR ARTIFICIAL OPENING ENDOSCOPIC, DIAGNOSTIC: ICD-10-PCS | Performed by: INTERNAL MEDICINE

## 2018-10-26 RX ORDER — ONDANSETRON 2 MG/ML
4 INJECTION INTRAMUSCULAR; INTRAVENOUS ONCE AS NEEDED
Status: DISCONTINUED | OUTPATIENT
Start: 2018-10-26 | End: 2018-10-26

## 2018-10-26 RX ORDER — NALOXONE HYDROCHLORIDE 0.4 MG/ML
80 INJECTION, SOLUTION INTRAMUSCULAR; INTRAVENOUS; SUBCUTANEOUS AS NEEDED
Status: DISCONTINUED | OUTPATIENT
Start: 2018-10-26 | End: 2018-10-26

## 2018-10-26 RX ORDER — HALOPERIDOL 5 MG/ML
0.25 INJECTION INTRAMUSCULAR ONCE AS NEEDED
Status: DISCONTINUED | OUTPATIENT
Start: 2018-10-26 | End: 2018-10-26

## 2018-10-26 RX ORDER — MIDAZOLAM HYDROCHLORIDE 1 MG/ML
INJECTION INTRAMUSCULAR; INTRAVENOUS AS NEEDED
Status: DISCONTINUED | OUTPATIENT
Start: 2018-10-26 | End: 2018-10-26 | Stop reason: SURG

## 2018-10-26 RX ORDER — LIDOCAINE HYDROCHLORIDE 10 MG/ML
INJECTION, SOLUTION EPIDURAL; INFILTRATION; INTRACAUDAL; PERINEURAL AS NEEDED
Status: DISCONTINUED | OUTPATIENT
Start: 2018-10-26 | End: 2018-10-26 | Stop reason: SURG

## 2018-10-26 RX ORDER — HYDROCODONE BITARTRATE AND ACETAMINOPHEN 5; 325 MG/1; MG/1
2 TABLET ORAL AS NEEDED
Status: DISCONTINUED | OUTPATIENT
Start: 2018-10-26 | End: 2018-10-26

## 2018-10-26 RX ORDER — SODIUM CHLORIDE, SODIUM LACTATE, POTASSIUM CHLORIDE, CALCIUM CHLORIDE 600; 310; 30; 20 MG/100ML; MG/100ML; MG/100ML; MG/100ML
INJECTION, SOLUTION INTRAVENOUS CONTINUOUS
Status: DISCONTINUED | OUTPATIENT
Start: 2018-10-26 | End: 2018-10-26

## 2018-10-26 RX ORDER — HYDROCODONE BITARTRATE AND ACETAMINOPHEN 5; 325 MG/1; MG/1
1 TABLET ORAL AS NEEDED
Status: DISCONTINUED | OUTPATIENT
Start: 2018-10-26 | End: 2018-10-26

## 2018-10-26 RX ORDER — MORPHINE SULFATE 4 MG/ML
4 INJECTION, SOLUTION INTRAMUSCULAR; INTRAVENOUS EVERY 10 MIN PRN
Status: DISCONTINUED | OUTPATIENT
Start: 2018-10-26 | End: 2018-10-26

## 2018-10-26 RX ORDER — MORPHINE SULFATE 4 MG/ML
2 INJECTION, SOLUTION INTRAMUSCULAR; INTRAVENOUS EVERY 10 MIN PRN
Status: DISCONTINUED | OUTPATIENT
Start: 2018-10-26 | End: 2018-10-26

## 2018-10-26 RX ORDER — MORPHINE SULFATE 10 MG/ML
6 INJECTION, SOLUTION INTRAMUSCULAR; INTRAVENOUS EVERY 10 MIN PRN
Status: DISCONTINUED | OUTPATIENT
Start: 2018-10-26 | End: 2018-10-26

## 2018-10-26 RX ADMIN — MIDAZOLAM HYDROCHLORIDE 2 MG: 1 INJECTION INTRAMUSCULAR; INTRAVENOUS at 13:06:00

## 2018-10-26 RX ADMIN — SODIUM CHLORIDE, SODIUM LACTATE, POTASSIUM CHLORIDE, CALCIUM CHLORIDE: 600; 310; 30; 20 INJECTION, SOLUTION INTRAVENOUS at 13:03:00

## 2018-10-26 RX ADMIN — SODIUM CHLORIDE, SODIUM LACTATE, POTASSIUM CHLORIDE, CALCIUM CHLORIDE: 600; 310; 30; 20 INJECTION, SOLUTION INTRAVENOUS at 13:37:00

## 2018-10-26 RX ADMIN — LIDOCAINE HYDROCHLORIDE 80 MG: 10 INJECTION, SOLUTION EPIDURAL; INFILTRATION; INTRACAUDAL; PERINEURAL at 13:06:00

## 2018-10-26 NOTE — OPERATIVE REPORT
ESOPHAGOGASTRODUODENOSCOPY AND COLONOSCOPY REPORT    Patient Name:  Eduardo Lay Record #: [id number]  YOB: 1950  Date of Procedure: 10/26/2018    Referring physician: Roz Roca MD    Surgeon:  Jeison Doran MD    Pre-op biopsy results  Will need to repeat colonoscopy at a later date due to poor prep, will need two day prep in future  In regards to her diuretics, mild bump in creatinine.  Advised to hold for 2 days and can restart at lasix 20mg and spirinalactone 50mg as di

## 2018-10-26 NOTE — ANESTHESIA POSTPROCEDURE EVALUATION
Patient: Demian Ham    Procedure Summary     Date:  10/26/18 Room / Location:  [facility] ENDOSCOPY 05 / [facility] ENDOSCOPY    Anesthesia Start:  5435 Anesthesia Stop:      Procedures:       COLONOSCOPY (N/A )      ESOPHAGOGASTRODUODENOSCOPY (EGD) (N/A ) Diagnosis

## 2018-10-26 NOTE — ANESTHESIA PREPROCEDURE EVALUATION
Anesthesia PreOp Note    HPI:     Angelika Addison is a 78year old female who presents for preoperative consultation requested by: Halina Jo MD    Date of Surgery: 10/26/2018    Procedure(s):  COLONOSCOPY  ESOPHAGOGASTRODUODENOSCOPY (EGD)  Indication: (40 mg total) by mouth every morning before breakfast. Disp: 90 tablet Rfl: 3 Taking   TraZODone HCl 50 MG Oral Tab Take 1 tablet (50 mg total) by mouth nightly.  Disp: 90 tablet Rfl: 3 Taking   Fluticasone Propionate 50 MCG/ACT Nasal Suspension 2 sprays by 10/04/2018    MCH 31.9 10/04/2018    MCHC 33.5 10/04/2018    RDW 15.4 (H) 10/04/2018     10/04/2018    MPV 8.5 10/04/2018     Lab Results   Component Value Date     10/25/2018    K 4.20 10/25/2018     10/25/2018    CO2 26.2 10/25/2018

## 2018-10-27 VITALS
DIASTOLIC BLOOD PRESSURE: 66 MMHG | HEIGHT: 67 IN | RESPIRATION RATE: 14 BRPM | WEIGHT: 157 LBS | SYSTOLIC BLOOD PRESSURE: 138 MMHG | BODY MASS INDEX: 24.64 KG/M2 | HEART RATE: 78 BPM | OXYGEN SATURATION: 97 %

## 2018-11-01 NOTE — PROGRESS NOTES
11/1/2018  Lee Ann Ferris  [address] [place] [id number]    Dear Marleny Person,       Here are the findings from your recent EGD (Upper  Endoscopy) :     Biopsies in the small intestine were normal. Will still need to repeat colonoscopy with two day pr

## 2018-11-21 PROBLEM — I10 ESSENTIAL HYPERTENSION: Status: ACTIVE | Noted: 2018-11-21

## 2018-11-21 PROBLEM — R18.8 CIRRHOSIS OF LIVER WITH ASCITES, UNSPECIFIED HEPATIC CIRRHOSIS TYPE (HCC): Status: ACTIVE | Noted: 2018-11-21

## 2018-11-21 PROBLEM — K74.60 CIRRHOSIS OF LIVER WITH ASCITES, UNSPECIFIED HEPATIC CIRRHOSIS TYPE (HCC): Status: ACTIVE | Noted: 2018-11-21

## 2018-12-04 PROBLEM — R35.0 FREQUENT URINATION: Status: ACTIVE | Noted: 2018-12-04

## 2018-12-04 PROBLEM — R30.0 BURNING WITH URINATION: Status: ACTIVE | Noted: 2018-12-04

## 2018-12-04 PROCEDURE — 87086 URINE CULTURE/COLONY COUNT: CPT | Performed by: INTERNAL MEDICINE

## 2018-12-04 PROCEDURE — 87186 SC STD MICRODIL/AGAR DIL: CPT | Performed by: INTERNAL MEDICINE

## 2018-12-04 PROCEDURE — 87088 URINE BACTERIA CULTURE: CPT | Performed by: INTERNAL MEDICINE

## 2019-01-01 ENCOUNTER — APPOINTMENT (OUTPATIENT)
Dept: MRI IMAGING | Facility: HOSPITAL | Age: 69
DRG: 516 | End: 2019-01-01
Attending: HOSPITALIST
Payer: COMMERCIAL

## 2019-01-01 ENCOUNTER — OFFICE VISIT (OUTPATIENT)
Dept: INTERVENTIONAL RADIOLOGY/VASCULAR | Facility: HOSPITAL | Age: 69
End: 2019-01-01
Attending: CLINICAL NURSE SPECIALIST
Payer: MEDICARE

## 2019-01-01 ENCOUNTER — APPOINTMENT (OUTPATIENT)
Dept: CT IMAGING | Facility: HOSPITAL | Age: 69
End: 2019-01-01
Attending: PHYSICIAN ASSISTANT
Payer: MEDICARE

## 2019-01-01 ENCOUNTER — HOSPITAL ENCOUNTER (EMERGENCY)
Facility: HOSPITAL | Age: 69
Discharge: HOME | End: 2019-01-01
Attending: PHYSICIAN ASSISTANT
Payer: MEDICARE

## 2019-01-01 ENCOUNTER — APPOINTMENT (OUTPATIENT)
Dept: GENERAL RADIOLOGY | Facility: HOSPITAL | Age: 69
End: 2019-01-01
Attending: PHYSICIAN ASSISTANT
Payer: MEDICARE

## 2019-01-01 ENCOUNTER — APPOINTMENT (OUTPATIENT)
Dept: GENERAL RADIOLOGY | Facility: HOSPITAL | Age: 69
DRG: 516 | End: 2019-01-01
Attending: EMERGENCY MEDICINE
Payer: COMMERCIAL

## 2019-01-01 ENCOUNTER — APPOINTMENT (OUTPATIENT)
Dept: ULTRASOUND IMAGING | Facility: HOSPITAL | Age: 69
DRG: 516 | End: 2019-01-01
Attending: INTERNAL MEDICINE
Payer: COMMERCIAL

## 2019-01-01 ENCOUNTER — APPOINTMENT (OUTPATIENT)
Dept: INTERVENTIONAL RADIOLOGY/VASCULAR | Facility: HOSPITAL | Age: 69
DRG: 542 | End: 2019-01-01
Attending: CLINICAL NURSE SPECIALIST
Payer: MEDICARE

## 2019-01-01 ENCOUNTER — ANESTHESIA EVENT (OUTPATIENT)
Dept: INTERVENTIONAL RADIOLOGY/VASCULAR | Facility: HOSPITAL | Age: 69
DRG: 516 | End: 2019-01-01
Payer: COMMERCIAL

## 2019-01-01 ENCOUNTER — APPOINTMENT (OUTPATIENT)
Dept: INTERVENTIONAL RADIOLOGY/VASCULAR | Facility: HOSPITAL | Age: 69
DRG: 516 | End: 2019-01-01
Attending: HOSPITALIST
Payer: COMMERCIAL

## 2019-01-01 ENCOUNTER — APPOINTMENT (OUTPATIENT)
Dept: GENERAL RADIOLOGY | Facility: HOSPITAL | Age: 69
DRG: 516 | End: 2019-01-01
Attending: INTERNAL MEDICINE
Payer: COMMERCIAL

## 2019-01-01 ENCOUNTER — APPOINTMENT (OUTPATIENT)
Dept: ULTRASOUND IMAGING | Facility: HOSPITAL | Age: 69
DRG: 542 | End: 2019-01-01
Attending: HOSPITALIST
Payer: MEDICARE

## 2019-01-01 ENCOUNTER — HOSPITAL ENCOUNTER (INPATIENT)
Facility: HOSPITAL | Age: 69
LOS: 6 days | Discharge: NURSING, REHAB OR LONG-TERM CARE | DRG: 516 | End: 2019-01-01
Attending: EMERGENCY MEDICINE | Admitting: HOSPITALIST
Payer: COMMERCIAL

## 2019-01-01 ENCOUNTER — TELEPHONE (OUTPATIENT)
Dept: CASE MANAGEMENT | Facility: HOSPITAL | Age: 69
End: 2019-01-01

## 2019-01-01 ENCOUNTER — APPOINTMENT (OUTPATIENT)
Dept: GENERAL RADIOLOGY | Facility: HOSPITAL | Age: 69
DRG: 542 | End: 2019-01-01
Attending: INTERNAL MEDICINE
Payer: MEDICARE

## 2019-01-01 ENCOUNTER — HOSPITAL ENCOUNTER (INPATIENT)
Facility: HOSPITAL | Age: 69
LOS: 2 days | Discharge: HOME HEALTH | DRG: 542 | End: 2019-01-01
Attending: EMERGENCY MEDICINE | Admitting: HOSPITALIST
Payer: MEDICARE

## 2019-01-01 ENCOUNTER — APPOINTMENT (OUTPATIENT)
Dept: MRI IMAGING | Facility: HOSPITAL | Age: 69
DRG: 542 | End: 2019-01-01
Attending: NURSE PRACTITIONER
Payer: MEDICARE

## 2019-01-01 ENCOUNTER — APPOINTMENT (OUTPATIENT)
Dept: ULTRASOUND IMAGING | Facility: HOSPITAL | Age: 69
DRG: 542 | End: 2019-01-01
Attending: INTERNAL MEDICINE
Payer: MEDICARE

## 2019-01-01 VITALS
DIASTOLIC BLOOD PRESSURE: 58 MMHG | HEIGHT: 67 IN | BODY MASS INDEX: 22.76 KG/M2 | SYSTOLIC BLOOD PRESSURE: 129 MMHG | RESPIRATION RATE: 18 BRPM | TEMPERATURE: 98 F | OXYGEN SATURATION: 96 % | HEART RATE: 69 BPM | WEIGHT: 145 LBS

## 2019-01-01 VITALS
HEIGHT: 67 IN | OXYGEN SATURATION: 95 % | BODY MASS INDEX: 26.57 KG/M2 | WEIGHT: 169.31 LBS | TEMPERATURE: 98 F | SYSTOLIC BLOOD PRESSURE: 125 MMHG | RESPIRATION RATE: 18 BRPM | DIASTOLIC BLOOD PRESSURE: 65 MMHG | HEART RATE: 77 BPM

## 2019-01-01 VITALS
HEIGHT: 67 IN | BODY MASS INDEX: 23.05 KG/M2 | WEIGHT: 146.88 LBS | OXYGEN SATURATION: 97 % | RESPIRATION RATE: 18 BRPM | SYSTOLIC BLOOD PRESSURE: 117 MMHG | DIASTOLIC BLOOD PRESSURE: 60 MMHG | HEART RATE: 73 BPM | TEMPERATURE: 98 F

## 2019-01-01 DIAGNOSIS — S32.020A COMPRESSION FRACTURE OF L2 VERTEBRA, INITIAL ENCOUNTER (HCC): Primary | ICD-10-CM

## 2019-01-01 DIAGNOSIS — S32.020A CLOSED COMPRESSION FRACTURE OF L2 LUMBAR VERTEBRA, INITIAL ENCOUNTER (HCC): Primary | ICD-10-CM

## 2019-01-01 DIAGNOSIS — M54.16 LUMBAR RADICULOPATHY: Primary | ICD-10-CM

## 2019-01-01 DIAGNOSIS — S22.060S COMPRESSION FRACTURE OF T7 VERTEBRA, SEQUELA: ICD-10-CM

## 2019-01-01 DIAGNOSIS — M54.9 INTRACTABLE BACK PAIN: ICD-10-CM

## 2019-01-01 DIAGNOSIS — S22.050A COMPRESSION FRACTURE OF T6 VERTEBRA, INITIAL ENCOUNTER (HCC): ICD-10-CM

## 2019-01-01 DIAGNOSIS — N39.0 URINARY TRACT INFECTION WITHOUT HEMATURIA, SITE UNSPECIFIED: Primary | ICD-10-CM

## 2019-01-01 DIAGNOSIS — M54.9 INTRACTABLE BACK PAIN: Primary | ICD-10-CM

## 2019-01-01 LAB
ALBUMIN SERPL-MCNC: 2.4 G/DL (ref 3.4–5)
ALBUMIN SERPL-MCNC: 2.8 G/DL (ref 3.4–5)
ALBUMIN/GLOB SERPL: 0.6 {RATIO} (ref 1–2)
ALBUMIN/GLOB SERPL: 0.7 {RATIO} (ref 1–2)
ALP LIVER SERPL-CCNC: 134 U/L (ref 55–142)
ALP LIVER SERPL-CCNC: 148 U/L (ref 55–142)
ALT SERPL-CCNC: 23 U/L (ref 13–56)
ALT SERPL-CCNC: 26 U/L (ref 13–56)
ANION GAP SERPL CALC-SCNC: 10 MMOL/L (ref 0–18)
ANION GAP SERPL CALC-SCNC: 8 MMOL/L (ref 0–18)
ANION GAP SERPL CALC-SCNC: 8 MMOL/L (ref 0–18)
AST SERPL-CCNC: 36 U/L (ref 15–37)
AST SERPL-CCNC: 41 U/L (ref 15–37)
BASOPHILS # BLD AUTO: 0.02 X10(3) UL (ref 0–0.2)
BASOPHILS # BLD AUTO: 0.02 X10(3) UL (ref 0–0.2)
BASOPHILS # BLD AUTO: 0.05 X10(3) UL (ref 0–0.2)
BASOPHILS NFR BLD AUTO: 0.3 %
BASOPHILS NFR BLD AUTO: 0.3 %
BASOPHILS NFR BLD AUTO: 0.6 %
BILIRUB SERPL-MCNC: 1.5 MG/DL (ref 0.1–2)
BILIRUB SERPL-MCNC: 1.5 MG/DL (ref 0.1–2)
BILIRUB UR QL: NEGATIVE
BUN BLD-MCNC: 14 MG/DL (ref 7–18)
BUN BLD-MCNC: 19 MG/DL (ref 7–18)
BUN BLD-MCNC: 19 MG/DL (ref 7–18)
BUN/CREAT SERPL: 20 (ref 10–20)
BUN/CREAT SERPL: 22.6 (ref 10–20)
BUN/CREAT SERPL: 25 (ref 10–20)
CALCIUM BLD-MCNC: 8.1 MG/DL (ref 8.5–10.1)
CALCIUM BLD-MCNC: 8.4 MG/DL (ref 8.5–10.1)
CALCIUM BLD-MCNC: 8.5 MG/DL (ref 8.5–10.1)
CHLORIDE SERPL-SCNC: 100 MMOL/L (ref 98–112)
CHLORIDE SERPL-SCNC: 103 MMOL/L (ref 98–112)
CHLORIDE SERPL-SCNC: 99 MMOL/L (ref 98–112)
CO2 SERPL-SCNC: 25 MMOL/L (ref 21–32)
CO2 SERPL-SCNC: 27 MMOL/L (ref 21–32)
CO2 SERPL-SCNC: 27 MMOL/L (ref 21–32)
COLOR UR: YELLOW
CREAT BLD-MCNC: 0.7 MG/DL (ref 0.55–1.02)
CREAT BLD-MCNC: 0.76 MG/DL (ref 0.55–1.02)
CREAT BLD-MCNC: 0.84 MG/DL (ref 0.55–1.02)
DEPRECATED RDW RBC AUTO: 63.1 FL (ref 35.1–46.3)
DEPRECATED RDW RBC AUTO: 63.7 FL (ref 35.1–46.3)
DEPRECATED RDW RBC AUTO: 64.1 FL (ref 35.1–46.3)
EOSINOPHIL # BLD AUTO: 0.01 X10(3) UL (ref 0–0.7)
EOSINOPHIL # BLD AUTO: 0.03 X10(3) UL (ref 0–0.7)
EOSINOPHIL # BLD AUTO: 0.31 X10(3) UL (ref 0–0.7)
EOSINOPHIL NFR BLD AUTO: 0.1 %
EOSINOPHIL NFR BLD AUTO: 0.4 %
EOSINOPHIL NFR BLD AUTO: 3.5 %
ERYTHROCYTE [DISTWIDTH] IN BLOOD BY AUTOMATED COUNT: 16.9 % (ref 11–15)
ERYTHROCYTE [DISTWIDTH] IN BLOOD BY AUTOMATED COUNT: 17 % (ref 11–15)
ERYTHROCYTE [DISTWIDTH] IN BLOOD BY AUTOMATED COUNT: 17 % (ref 11–15)
GLOBULIN PLAS-MCNC: 3.7 G/DL (ref 2.8–4.4)
GLOBULIN PLAS-MCNC: 3.9 G/DL (ref 2.8–4.4)
GLUCOSE BLD-MCNC: 105 MG/DL (ref 70–99)
GLUCOSE BLD-MCNC: 117 MG/DL (ref 70–99)
GLUCOSE BLD-MCNC: 94 MG/DL (ref 70–99)
GLUCOSE UR-MCNC: NEGATIVE MG/DL
HAV IGM SER QL: 2.4 MG/DL (ref 1.6–2.6)
HCT VFR BLD AUTO: 27.9 % (ref 35–48)
HCT VFR BLD AUTO: 29 % (ref 35–48)
HCT VFR BLD AUTO: 29.9 % (ref 35–48)
HGB BLD-MCNC: 10 G/DL (ref 12–16)
HGB BLD-MCNC: 9.3 G/DL (ref 12–16)
HGB BLD-MCNC: 9.5 G/DL (ref 12–16)
IMM GRANULOCYTES # BLD AUTO: 0.02 X10(3) UL (ref 0–1)
IMM GRANULOCYTES # BLD AUTO: 0.04 X10(3) UL (ref 0–1)
IMM GRANULOCYTES # BLD AUTO: 0.04 X10(3) UL (ref 0–1)
IMM GRANULOCYTES NFR BLD: 0.3 %
IMM GRANULOCYTES NFR BLD: 0.5 %
IMM GRANULOCYTES NFR BLD: 0.5 %
INR BLD: 1.49 (ref 0.9–1.2)
INR BLD: 1.69 (ref 0.9–1.2)
KETONES UR-MCNC: NEGATIVE MG/DL
LYMPHOCYTES # BLD AUTO: 0.54 X10(3) UL (ref 1–4)
LYMPHOCYTES # BLD AUTO: 0.6 X10(3) UL (ref 1–4)
LYMPHOCYTES # BLD AUTO: 1 X10(3) UL (ref 1–4)
LYMPHOCYTES NFR BLD AUTO: 11.4 %
LYMPHOCYTES NFR BLD AUTO: 6.9 %
LYMPHOCYTES NFR BLD AUTO: 8.9 %
M PROTEIN MFR SERPL ELPH: 6.1 G/DL (ref 6.4–8.2)
M PROTEIN MFR SERPL ELPH: 6.7 G/DL (ref 6.4–8.2)
MCH RBC QN AUTO: 33.5 PG (ref 26–34)
MCH RBC QN AUTO: 33.6 PG (ref 26–34)
MCH RBC QN AUTO: 34.2 PG (ref 26–34)
MCHC RBC AUTO-ENTMCNC: 32.8 G/DL (ref 31–37)
MCHC RBC AUTO-ENTMCNC: 33.3 G/DL (ref 31–37)
MCHC RBC AUTO-ENTMCNC: 33.4 G/DL (ref 31–37)
MCV RBC AUTO: 100.3 FL (ref 80–100)
MCV RBC AUTO: 102.1 FL (ref 80–100)
MCV RBC AUTO: 102.6 FL (ref 80–100)
MONOCYTES # BLD AUTO: 0.9 X10(3) UL (ref 0.1–1)
MONOCYTES # BLD AUTO: 0.96 X10(3) UL (ref 0.1–1)
MONOCYTES # BLD AUTO: 1.57 X10(3) UL (ref 0.1–1)
MONOCYTES NFR BLD AUTO: 11.5 %
MONOCYTES NFR BLD AUTO: 14.2 %
MONOCYTES NFR BLD AUTO: 17.8 %
NEUTROPHILS # BLD AUTO: 5.13 X10 (3) UL (ref 1.5–7.7)
NEUTROPHILS # BLD AUTO: 5.13 X10(3) UL (ref 1.5–7.7)
NEUTROPHILS # BLD AUTO: 5.83 X10 (3) UL (ref 1.5–7.7)
NEUTROPHILS # BLD AUTO: 5.83 X10(3) UL (ref 1.5–7.7)
NEUTROPHILS # BLD AUTO: 6.3 X10 (3) UL (ref 1.5–7.7)
NEUTROPHILS # BLD AUTO: 6.3 X10(3) UL (ref 1.5–7.7)
NEUTROPHILS NFR BLD AUTO: 66.2 %
NEUTROPHILS NFR BLD AUTO: 75.9 %
NEUTROPHILS NFR BLD AUTO: 80.7 %
NITRITE UR QL STRIP.AUTO: POSITIVE
OSMOLALITY SERPL CALC.SUM OF ELEC: 278 MOSM/KG (ref 275–295)
OSMOLALITY SERPL CALC.SUM OF ELEC: 283 MOSM/KG (ref 275–295)
OSMOLALITY SERPL CALC.SUM OF ELEC: 289 MOSM/KG (ref 275–295)
PH UR: 5 [PH] (ref 5–8)
PLATELET # BLD AUTO: 115 10(3)UL (ref 150–450)
PLATELET # BLD AUTO: 121 10(3)UL (ref 150–450)
PLATELET # BLD AUTO: 128 10(3)UL (ref 150–450)
POTASSIUM SERPL-SCNC: 3.3 MMOL/L (ref 3.5–5.1)
POTASSIUM SERPL-SCNC: 3.7 MMOL/L (ref 3.5–5.1)
POTASSIUM SERPL-SCNC: 4.3 MMOL/L (ref 3.5–5.1)
POTASSIUM SERPL-SCNC: 5 MMOL/L (ref 3.5–5.1)
PROT UR-MCNC: 30 MG/DL
PROTHROMBIN TIME: 18 SECONDS (ref 11.8–14.5)
PROTHROMBIN TIME: 19.9 SECONDS (ref 11.8–14.5)
RBC # BLD AUTO: 2.72 X10(6)UL (ref 3.8–5.3)
RBC # BLD AUTO: 2.84 X10(6)UL (ref 3.8–5.3)
RBC # BLD AUTO: 2.98 X10(6)UL (ref 3.8–5.3)
RBC #/AREA URNS AUTO: 3 /HPF
SODIUM SERPL-SCNC: 134 MMOL/L (ref 136–145)
SODIUM SERPL-SCNC: 135 MMOL/L (ref 136–145)
SODIUM SERPL-SCNC: 138 MMOL/L (ref 136–145)
SP GR UR STRIP: 1.01 (ref 1–1.03)
UROBILINOGEN UR STRIP-ACNC: 4
VIT C UR-MCNC: NEGATIVE MG/DL
WBC # BLD AUTO: 6.8 X10(3) UL (ref 4–11)
WBC # BLD AUTO: 7.8 X10(3) UL (ref 4–11)
WBC # BLD AUTO: 8.8 X10(3) UL (ref 4–11)
WBC #/AREA URNS AUTO: 228 /HPF

## 2019-01-01 PROCEDURE — 87088 URINE BACTERIA CULTURE: CPT | Performed by: INTERNAL MEDICINE

## 2019-01-01 PROCEDURE — 80048 BASIC METABOLIC PNL TOTAL CA: CPT | Performed by: INTERNAL MEDICINE

## 2019-01-01 PROCEDURE — 97530 THERAPEUTIC ACTIVITIES: CPT

## 2019-01-01 PROCEDURE — 85027 COMPLETE CBC AUTOMATED: CPT | Performed by: INTERNAL MEDICINE

## 2019-01-01 PROCEDURE — C9113 INJ PANTOPRAZOLE SODIUM, VIA: HCPCS | Performed by: HOSPITALIST

## 2019-01-01 PROCEDURE — 84132 ASSAY OF SERUM POTASSIUM: CPT | Performed by: HOSPITALIST

## 2019-01-01 PROCEDURE — 80053 COMPREHEN METABOLIC PANEL: CPT | Performed by: HOSPITALIST

## 2019-01-01 PROCEDURE — 97116 GAIT TRAINING THERAPY: CPT

## 2019-01-01 PROCEDURE — 96376 TX/PRO/DX INJ SAME DRUG ADON: CPT

## 2019-01-01 PROCEDURE — 97165 OT EVAL LOW COMPLEX 30 MIN: CPT

## 2019-01-01 PROCEDURE — 96361 HYDRATE IV INFUSION ADD-ON: CPT

## 2019-01-01 PROCEDURE — 99285 EMERGENCY DEPT VISIT HI MDM: CPT

## 2019-01-01 PROCEDURE — 84132 ASSAY OF SERUM POTASSIUM: CPT | Performed by: INTERNAL MEDICINE

## 2019-01-01 PROCEDURE — 81001 URINALYSIS AUTO W/SCOPE: CPT | Performed by: EMERGENCY MEDICINE

## 2019-01-01 PROCEDURE — 0QS03ZZ REPOSITION LUMBAR VERTEBRA, PERCUTANEOUS APPROACH: ICD-10-PCS | Performed by: RADIOLOGY

## 2019-01-01 PROCEDURE — 22515 PERQ VERTEBRAL AUGMENTATION: CPT

## 2019-01-01 PROCEDURE — 0QU03JZ SUPPLEMENT LUMBAR VERTEBRA WITH SYNTHETIC SUBSTITUTE, PERCUTANEOUS APPROACH: ICD-10-PCS | Performed by: RADIOLOGY

## 2019-01-01 PROCEDURE — 96375 TX/PRO/DX INJ NEW DRUG ADDON: CPT

## 2019-01-01 PROCEDURE — 72148 MRI LUMBAR SPINE W/O DYE: CPT | Performed by: NURSE PRACTITIONER

## 2019-01-01 PROCEDURE — 89050 BODY FLUID CELL COUNT: CPT | Performed by: HOSPITALIST

## 2019-01-01 PROCEDURE — 85025 COMPLETE CBC W/AUTO DIFF WBC: CPT | Performed by: HOSPITALIST

## 2019-01-01 PROCEDURE — 80048 BASIC METABOLIC PNL TOTAL CA: CPT | Performed by: NURSE PRACTITIONER

## 2019-01-01 PROCEDURE — 72072 X-RAY EXAM THORAC SPINE 3VWS: CPT | Performed by: EMERGENCY MEDICINE

## 2019-01-01 PROCEDURE — 97162 PT EVAL MOD COMPLEX 30 MIN: CPT

## 2019-01-01 PROCEDURE — 80048 BASIC METABOLIC PNL TOTAL CA: CPT | Performed by: EMERGENCY MEDICINE

## 2019-01-01 PROCEDURE — 96374 THER/PROPH/DIAG INJ IV PUSH: CPT

## 2019-01-01 PROCEDURE — 87186 SC STD MICRODIL/AGAR DIL: CPT | Performed by: NURSE PRACTITIONER

## 2019-01-01 PROCEDURE — 76705 ECHO EXAM OF ABDOMEN: CPT | Performed by: INTERNAL MEDICINE

## 2019-01-01 PROCEDURE — 96372 THER/PROPH/DIAG INJ SC/IM: CPT

## 2019-01-01 PROCEDURE — 85610 PROTHROMBIN TIME: CPT | Performed by: CLINICAL NURSE SPECIALIST

## 2019-01-01 PROCEDURE — 97535 SELF CARE MNGMENT TRAINING: CPT

## 2019-01-01 PROCEDURE — 22513 PERQ VERTEBRAL AUGMENTATION: CPT

## 2019-01-01 PROCEDURE — 85610 PROTHROMBIN TIME: CPT | Performed by: HOSPITALIST

## 2019-01-01 PROCEDURE — 99152 MOD SED SAME PHYS/QHP 5/>YRS: CPT

## 2019-01-01 PROCEDURE — 85025 COMPLETE CBC W/AUTO DIFF WBC: CPT | Performed by: EMERGENCY MEDICINE

## 2019-01-01 PROCEDURE — 83735 ASSAY OF MAGNESIUM: CPT | Performed by: HOSPITALIST

## 2019-01-01 PROCEDURE — 87088 URINE BACTERIA CULTURE: CPT | Performed by: NURSE PRACTITIONER

## 2019-01-01 PROCEDURE — 74019 RADEX ABDOMEN 2 VIEWS: CPT | Performed by: INTERNAL MEDICINE

## 2019-01-01 PROCEDURE — 82306 VITAMIN D 25 HYDROXY: CPT | Performed by: NURSE PRACTITIONER

## 2019-01-01 PROCEDURE — P9047 ALBUMIN (HUMAN), 25%, 50ML: HCPCS

## 2019-01-01 PROCEDURE — 74018 RADEX ABDOMEN 1 VIEW: CPT | Performed by: INTERNAL MEDICINE

## 2019-01-01 PROCEDURE — 87186 SC STD MICRODIL/AGAR DIL: CPT | Performed by: INTERNAL MEDICINE

## 2019-01-01 PROCEDURE — 99153 MOD SED SAME PHYS/QHP EA: CPT

## 2019-01-01 PROCEDURE — 87086 URINE CULTURE/COLONY COUNT: CPT | Performed by: NURSE PRACTITIONER

## 2019-01-01 PROCEDURE — 94640 AIRWAY INHALATION TREATMENT: CPT

## 2019-01-01 PROCEDURE — 87205 SMEAR GRAM STAIN: CPT | Performed by: HOSPITALIST

## 2019-01-01 PROCEDURE — 0W9G3ZZ DRAINAGE OF PERITONEAL CAVITY, PERCUTANEOUS APPROACH: ICD-10-PCS | Performed by: RADIOLOGY

## 2019-01-01 PROCEDURE — 76705 ECHO EXAM OF ABDOMEN: CPT | Performed by: HOSPITALIST

## 2019-01-01 PROCEDURE — 87088 URINE BACTERIA CULTURE: CPT | Performed by: EMERGENCY MEDICINE

## 2019-01-01 PROCEDURE — 22514 PERQ VERTEBRAL AUGMENTATION: CPT

## 2019-01-01 PROCEDURE — 0PU43JZ SUPPLEMENT THORACIC VERTEBRA WITH SYNTHETIC SUBSTITUTE, PERCUTANEOUS APPROACH: ICD-10-PCS | Performed by: RADIOLOGY

## 2019-01-01 PROCEDURE — 99284 EMERGENCY DEPT VISIT MOD MDM: CPT

## 2019-01-01 PROCEDURE — 81003 URINALYSIS AUTO W/O SCOPE: CPT | Performed by: PHYSICIAN ASSISTANT

## 2019-01-01 PROCEDURE — 87086 URINE CULTURE/COLONY COUNT: CPT | Performed by: INTERNAL MEDICINE

## 2019-01-01 PROCEDURE — 87075 CULTR BACTERIA EXCEPT BLOOD: CPT | Performed by: HOSPITALIST

## 2019-01-01 PROCEDURE — 89051 BODY FLUID CELL COUNT: CPT | Performed by: HOSPITALIST

## 2019-01-01 PROCEDURE — 97166 OT EVAL MOD COMPLEX 45 MIN: CPT

## 2019-01-01 PROCEDURE — 49083 ABD PARACENTESIS W/IMAGING: CPT | Performed by: HOSPITALIST

## 2019-01-01 PROCEDURE — 72148 MRI LUMBAR SPINE W/O DYE: CPT | Performed by: HOSPITALIST

## 2019-01-01 PROCEDURE — 72100 X-RAY EXAM L-S SPINE 2/3 VWS: CPT | Performed by: EMERGENCY MEDICINE

## 2019-01-01 PROCEDURE — 87186 SC STD MICRODIL/AGAR DIL: CPT | Performed by: EMERGENCY MEDICINE

## 2019-01-01 PROCEDURE — 0PS43ZZ REPOSITION THORACIC VERTEBRA, PERCUTANEOUS APPROACH: ICD-10-PCS | Performed by: RADIOLOGY

## 2019-01-01 PROCEDURE — 97110 THERAPEUTIC EXERCISES: CPT

## 2019-01-01 PROCEDURE — 85025 COMPLETE CBC W/AUTO DIFF WBC: CPT | Performed by: INTERNAL MEDICINE

## 2019-01-01 PROCEDURE — 87086 URINE CULTURE/COLONY COUNT: CPT | Performed by: EMERGENCY MEDICINE

## 2019-01-01 PROCEDURE — 97163 PT EVAL HIGH COMPLEX 45 MIN: CPT

## 2019-01-01 PROCEDURE — 72100 X-RAY EXAM L-S SPINE 2/3 VWS: CPT | Performed by: PHYSICIAN ASSISTANT

## 2019-01-01 PROCEDURE — 84443 ASSAY THYROID STIM HORMONE: CPT | Performed by: NURSE PRACTITIONER

## 2019-01-01 PROCEDURE — 87070 CULTURE OTHR SPECIMN AEROBIC: CPT | Performed by: HOSPITALIST

## 2019-01-01 PROCEDURE — 85025 COMPLETE CBC W/AUTO DIFF WBC: CPT | Performed by: NURSE PRACTITIONER

## 2019-01-01 RX ORDER — IPRATROPIUM BROMIDE AND ALBUTEROL SULFATE 2.5; .5 MG/3ML; MG/3ML
3 SOLUTION RESPIRATORY (INHALATION) EVERY 6 HOURS PRN
Status: DISCONTINUED | OUTPATIENT
Start: 2019-01-01 | End: 2019-01-01

## 2019-01-01 RX ORDER — TRAMADOL HYDROCHLORIDE 50 MG/1
50 TABLET ORAL EVERY 6 HOURS PRN
Status: DISCONTINUED | OUTPATIENT
Start: 2019-01-01 | End: 2019-01-01

## 2019-01-01 RX ORDER — LACTULOSE 10 G/15ML
20 SOLUTION ORAL 2 TIMES DAILY
Status: DISCONTINUED | OUTPATIENT
Start: 2019-01-01 | End: 2019-01-01

## 2019-01-01 RX ORDER — GABAPENTIN 300 MG/1
300 CAPSULE ORAL NIGHTLY
Status: DISCONTINUED | OUTPATIENT
Start: 2019-01-01 | End: 2019-01-01

## 2019-01-01 RX ORDER — SODIUM CHLORIDE 9 MG/ML
INJECTION, SOLUTION INTRAVENOUS CONTINUOUS
Status: DISCONTINUED | OUTPATIENT
Start: 2019-01-01 | End: 2019-01-01

## 2019-01-01 RX ORDER — SPIRONOLACTONE 50 MG/1
50 TABLET, FILM COATED ORAL DAILY
Status: DISCONTINUED | OUTPATIENT
Start: 2019-01-01 | End: 2019-01-01

## 2019-01-01 RX ORDER — POLYETHYLENE GLYCOL 3350 17 G/17G
17 POWDER, FOR SOLUTION ORAL DAILY PRN
Status: DISCONTINUED | OUTPATIENT
Start: 2019-01-01 | End: 2019-01-01

## 2019-01-01 RX ORDER — LORAZEPAM 2 MG/ML
0.5 INJECTION INTRAMUSCULAR ONCE
Status: COMPLETED | OUTPATIENT
Start: 2019-01-01 | End: 2019-01-01

## 2019-01-01 RX ORDER — LACTULOSE 10 G/15ML
20 SOLUTION ORAL NIGHTLY
Qty: 236 ML | Refills: 0 | Status: ON HOLD | COMMUNITY
Start: 2019-01-01 | End: 2020-01-01

## 2019-01-01 RX ORDER — ACETAMINOPHEN 325 MG/1
650 TABLET ORAL EVERY 6 HOURS PRN
Status: DISCONTINUED | OUTPATIENT
Start: 2019-01-01 | End: 2019-01-01

## 2019-01-01 RX ORDER — MONTELUKAST SODIUM 10 MG/1
10 TABLET ORAL NIGHTLY
Status: DISCONTINUED | OUTPATIENT
Start: 2019-01-01 | End: 2019-01-01

## 2019-01-01 RX ORDER — MORPHINE SULFATE 4 MG/ML
2 INJECTION, SOLUTION INTRAMUSCULAR; INTRAVENOUS EVERY 10 MIN PRN
Status: DISCONTINUED | OUTPATIENT
Start: 2019-01-01 | End: 2019-01-01 | Stop reason: HOSPADM

## 2019-01-01 RX ORDER — POTASSIUM CHLORIDE 20 MEQ/1
40 TABLET, EXTENDED RELEASE ORAL ONCE
Status: COMPLETED | OUTPATIENT
Start: 2019-01-01 | End: 2019-01-01

## 2019-01-01 RX ORDER — GABAPENTIN 300 MG/1
300 CAPSULE ORAL NIGHTLY
Qty: 1 CAPSULE | Refills: 0 | Status: SHIPPED | COMMUNITY
Start: 2019-01-01 | End: 2020-01-01

## 2019-01-01 RX ORDER — LIDOCAINE HYDROCHLORIDE 20 MG/ML
INJECTION, SOLUTION EPIDURAL; INFILTRATION; INTRACAUDAL; PERINEURAL
Status: COMPLETED
Start: 2019-01-01 | End: 2019-01-01

## 2019-01-01 RX ORDER — GABAPENTIN 100 MG/1
100 CAPSULE ORAL 3 TIMES DAILY
Status: DISCONTINUED | OUTPATIENT
Start: 2019-01-01 | End: 2019-01-01

## 2019-01-01 RX ORDER — ALBUMIN (HUMAN) 12.5 G/50ML
SOLUTION INTRAVENOUS
Status: DISCONTINUED
Start: 2019-01-01 | End: 2019-01-01

## 2019-01-01 RX ORDER — HYDROCODONE BITARTRATE AND ACETAMINOPHEN 10; 325 MG/1; MG/1
1 TABLET ORAL EVERY 6 HOURS PRN
Status: DISCONTINUED | OUTPATIENT
Start: 2019-01-01 | End: 2019-01-01

## 2019-01-01 RX ORDER — HYDROCODONE BITARTRATE AND ACETAMINOPHEN 5; 325 MG/1; MG/1
1 TABLET ORAL EVERY 6 HOURS PRN
Qty: 12 TABLET | Refills: 0 | Status: ON HOLD | OUTPATIENT
Start: 2019-01-01 | End: 2019-01-01

## 2019-01-01 RX ORDER — HYDROCODONE BITARTRATE AND ACETAMINOPHEN 5; 325 MG/1; MG/1
1 TABLET ORAL EVERY 4 HOURS PRN
Qty: 45 TABLET | Refills: 0 | Status: ON HOLD | OUTPATIENT
Start: 2019-01-01 | End: 2020-01-01

## 2019-01-01 RX ORDER — FUROSEMIDE 40 MG/1
40 TABLET ORAL DAILY
Qty: 90 TABLET | Refills: 3 | Status: SHIPPED | OUTPATIENT
Start: 2019-01-01 | End: 2019-01-01

## 2019-01-01 RX ORDER — MORPHINE SULFATE 2 MG/ML
2 INJECTION, SOLUTION INTRAMUSCULAR; INTRAVENOUS EVERY 2 HOUR PRN
Status: DISCONTINUED | OUTPATIENT
Start: 2019-01-01 | End: 2019-01-01

## 2019-01-01 RX ORDER — ONDANSETRON 2 MG/ML
4 INJECTION INTRAMUSCULAR; INTRAVENOUS ONCE AS NEEDED
Status: DISCONTINUED | OUTPATIENT
Start: 2019-01-01 | End: 2019-01-01 | Stop reason: HOSPADM

## 2019-01-01 RX ORDER — TRAMADOL HYDROCHLORIDE 50 MG/1
TABLET ORAL
Status: DISCONTINUED
Start: 2019-01-01 | End: 2019-01-01 | Stop reason: WASHOUT

## 2019-01-01 RX ORDER — CEFAZOLIN SODIUM/WATER 2 G/20 ML
SYRINGE (ML) INTRAVENOUS
Status: COMPLETED
Start: 2019-01-01 | End: 2019-01-01

## 2019-01-01 RX ORDER — SODIUM CHLORIDE 0.9 % (FLUSH) 0.9 %
3 SYRINGE (ML) INJECTION AS NEEDED
Status: DISCONTINUED | OUTPATIENT
Start: 2019-01-01 | End: 2019-01-01

## 2019-01-01 RX ORDER — HYDROCODONE BITARTRATE AND ACETAMINOPHEN 5; 325 MG/1; MG/1
1 TABLET ORAL EVERY 4 HOURS PRN
Status: DISCONTINUED | OUTPATIENT
Start: 2019-01-01 | End: 2019-01-01

## 2019-01-01 RX ORDER — BISACODYL 10 MG
10 SUPPOSITORY, RECTAL RECTAL 2 TIMES DAILY
Status: DISCONTINUED | OUTPATIENT
Start: 2019-01-01 | End: 2019-01-01

## 2019-01-01 RX ORDER — LIDOCAINE HYDROCHLORIDE 10 MG/ML
INJECTION, SOLUTION EPIDURAL; INFILTRATION; INTRACAUDAL; PERINEURAL AS NEEDED
Status: DISCONTINUED | OUTPATIENT
Start: 2019-01-01 | End: 2019-01-01 | Stop reason: SURG

## 2019-01-01 RX ORDER — CEPHALEXIN 500 MG/1
500 CAPSULE ORAL EVERY 6 HOURS SCHEDULED
Status: DISCONTINUED | OUTPATIENT
Start: 2019-01-01 | End: 2019-01-01

## 2019-01-01 RX ORDER — MORPHINE SULFATE 4 MG/ML
4 INJECTION, SOLUTION INTRAMUSCULAR; INTRAVENOUS ONCE
Status: COMPLETED | OUTPATIENT
Start: 2019-01-01 | End: 2019-01-01

## 2019-01-01 RX ORDER — MIDAZOLAM HYDROCHLORIDE 1 MG/ML
INJECTION INTRAMUSCULAR; INTRAVENOUS
Status: COMPLETED
Start: 2019-01-01 | End: 2019-01-01

## 2019-01-01 RX ORDER — BACLOFEN 10 MG/1
10 TABLET ORAL 3 TIMES DAILY PRN
Qty: 21 TABLET | Refills: 0 | Status: SHIPPED | OUTPATIENT
Start: 2019-01-01 | End: 2019-01-01

## 2019-01-01 RX ORDER — PANTOPRAZOLE SODIUM 40 MG/1
40 TABLET, DELAYED RELEASE ORAL
Status: DISCONTINUED | OUTPATIENT
Start: 2019-01-01 | End: 2019-01-01

## 2019-01-01 RX ORDER — TRAMADOL HYDROCHLORIDE 50 MG/1
50 TABLET ORAL EVERY 8 HOURS PRN
Qty: 15 TABLET | Refills: 0 | Status: SHIPPED | OUTPATIENT
Start: 2019-01-01 | End: 2019-01-01

## 2019-01-01 RX ORDER — MULTIVIT-MIN/IRON/FOLIC ACID/K 18-600-40
2000 CAPSULE ORAL DAILY
Qty: 30 CAPSULE | Refills: 0 | Status: SHIPPED | OUTPATIENT
Start: 2019-01-01 | End: 2020-01-01

## 2019-01-01 RX ORDER — MORPHINE SULFATE 2 MG/ML
1 INJECTION, SOLUTION INTRAMUSCULAR; INTRAVENOUS EVERY 2 HOUR PRN
Status: DISCONTINUED | OUTPATIENT
Start: 2019-01-01 | End: 2019-01-01

## 2019-01-01 RX ORDER — MELATONIN
325
Status: DISCONTINUED | OUTPATIENT
Start: 2019-01-01 | End: 2019-01-01

## 2019-01-01 RX ORDER — LACTULOSE 10 G/15ML
30 SOLUTION ORAL 4 TIMES DAILY
Status: DISCONTINUED | OUTPATIENT
Start: 2019-01-01 | End: 2019-01-01

## 2019-01-01 RX ORDER — MORPHINE SULFATE 2 MG/ML
2 INJECTION, SOLUTION INTRAMUSCULAR; INTRAVENOUS EVERY 4 HOURS PRN
Status: DISCONTINUED | OUTPATIENT
Start: 2019-01-01 | End: 2019-01-01

## 2019-01-01 RX ORDER — NALOXONE HYDROCHLORIDE 0.4 MG/ML
80 INJECTION, SOLUTION INTRAMUSCULAR; INTRAVENOUS; SUBCUTANEOUS AS NEEDED
Status: DISCONTINUED | OUTPATIENT
Start: 2019-01-01 | End: 2019-01-01 | Stop reason: HOSPADM

## 2019-01-01 RX ORDER — POTASSIUM CHLORIDE 20 MEQ/1
40 TABLET, EXTENDED RELEASE ORAL EVERY 4 HOURS
Status: COMPLETED | OUTPATIENT
Start: 2019-01-01 | End: 2019-01-01

## 2019-01-01 RX ORDER — HEPARIN SODIUM 5000 [USP'U]/ML
5000 INJECTION, SOLUTION INTRAVENOUS; SUBCUTANEOUS EVERY 12 HOURS SCHEDULED
Status: DISCONTINUED | OUTPATIENT
Start: 2019-01-01 | End: 2019-01-01

## 2019-01-01 RX ORDER — FUROSEMIDE 10 MG/ML
40 INJECTION INTRAMUSCULAR; INTRAVENOUS
Status: DISCONTINUED | OUTPATIENT
Start: 2019-01-01 | End: 2019-01-01

## 2019-01-01 RX ORDER — HYDROMORPHONE HYDROCHLORIDE 1 MG/ML
0.2 INJECTION, SOLUTION INTRAMUSCULAR; INTRAVENOUS; SUBCUTANEOUS EVERY 5 MIN PRN
Status: DISCONTINUED | OUTPATIENT
Start: 2019-01-01 | End: 2019-01-01 | Stop reason: HOSPADM

## 2019-01-01 RX ORDER — DEXAMETHASONE SODIUM PHOSPHATE 10 MG/ML
10 INJECTION, SOLUTION INTRAMUSCULAR; INTRAVENOUS ONCE
Status: COMPLETED | OUTPATIENT
Start: 2019-01-01 | End: 2019-01-01

## 2019-01-01 RX ORDER — GABAPENTIN 100 MG/1
100 CAPSULE ORAL
Status: DISCONTINUED | OUTPATIENT
Start: 2019-01-01 | End: 2019-01-01

## 2019-01-01 RX ORDER — ONDANSETRON 2 MG/ML
INJECTION INTRAMUSCULAR; INTRAVENOUS AS NEEDED
Status: DISCONTINUED | OUTPATIENT
Start: 2019-01-01 | End: 2019-01-01 | Stop reason: SURG

## 2019-01-01 RX ORDER — HYDROMORPHONE HYDROCHLORIDE 1 MG/ML
0.4 INJECTION, SOLUTION INTRAMUSCULAR; INTRAVENOUS; SUBCUTANEOUS EVERY 5 MIN PRN
Status: DISCONTINUED | OUTPATIENT
Start: 2019-01-01 | End: 2019-01-01 | Stop reason: HOSPADM

## 2019-01-01 RX ORDER — BISACODYL 10 MG
10 SUPPOSITORY, RECTAL RECTAL DAILY
Qty: 5 SUPPOSITORY | Refills: 0 | Status: SHIPPED | OUTPATIENT
Start: 2019-01-01 | End: 2019-01-01

## 2019-01-01 RX ORDER — TRAZODONE HYDROCHLORIDE 50 MG/1
50 TABLET ORAL NIGHTLY PRN
Status: DISCONTINUED | OUTPATIENT
Start: 2019-01-01 | End: 2019-01-01

## 2019-01-01 RX ORDER — HYDROCODONE BITARTRATE AND ACETAMINOPHEN 5; 325 MG/1; MG/1
TABLET ORAL
Status: COMPLETED
Start: 2019-01-01 | End: 2019-01-01

## 2019-01-01 RX ORDER — ONDANSETRON 2 MG/ML
4 INJECTION INTRAMUSCULAR; INTRAVENOUS ONCE
Status: COMPLETED | OUTPATIENT
Start: 2019-01-01 | End: 2019-01-01

## 2019-01-01 RX ORDER — MORPHINE SULFATE 10 MG/ML
6 INJECTION, SOLUTION INTRAMUSCULAR; INTRAVENOUS EVERY 10 MIN PRN
Status: DISCONTINUED | OUTPATIENT
Start: 2019-01-01 | End: 2019-01-01 | Stop reason: HOSPADM

## 2019-01-01 RX ORDER — POTASSIUM CHLORIDE 20 MEQ/1
40 TABLET, EXTENDED RELEASE ORAL EVERY 4 HOURS
Status: DISPENSED | OUTPATIENT
Start: 2019-01-01 | End: 2019-01-01

## 2019-01-01 RX ORDER — SODIUM CHLORIDE 9 MG/ML
INJECTION, SOLUTION INTRAVENOUS
Status: DISPENSED
Start: 2019-01-01 | End: 2019-01-01

## 2019-01-01 RX ORDER — FUROSEMIDE 40 MG/1
40 TABLET ORAL DAILY
Status: DISCONTINUED | OUTPATIENT
Start: 2019-01-01 | End: 2019-01-01

## 2019-01-01 RX ORDER — MORPHINE SULFATE 4 MG/ML
2 INJECTION, SOLUTION INTRAMUSCULAR; INTRAVENOUS ONCE
Status: COMPLETED | OUTPATIENT
Start: 2019-01-01 | End: 2019-01-01

## 2019-01-01 RX ORDER — SODIUM CHLORIDE, SODIUM LACTATE, POTASSIUM CHLORIDE, CALCIUM CHLORIDE 600; 310; 30; 20 MG/100ML; MG/100ML; MG/100ML; MG/100ML
INJECTION, SOLUTION INTRAVENOUS CONTINUOUS
Status: DISCONTINUED | OUTPATIENT
Start: 2019-01-01 | End: 2019-01-01

## 2019-01-01 RX ORDER — BACLOFEN 10 MG/1
10 TABLET ORAL 3 TIMES DAILY PRN
Status: DISCONTINUED | OUTPATIENT
Start: 2019-01-01 | End: 2019-01-01

## 2019-01-01 RX ORDER — DEXAMETHASONE SODIUM PHOSPHATE 4 MG/ML
VIAL (ML) INJECTION AS NEEDED
Status: DISCONTINUED | OUTPATIENT
Start: 2019-01-01 | End: 2019-01-01 | Stop reason: SURG

## 2019-01-01 RX ORDER — TRAMADOL HYDROCHLORIDE 50 MG/1
100 TABLET ORAL EVERY 6 HOURS PRN
Status: DISCONTINUED | OUTPATIENT
Start: 2019-01-01 | End: 2019-01-01

## 2019-01-01 RX ORDER — SODIUM PHOSPHATE, DIBASIC AND SODIUM PHOSPHATE, MONOBASIC 7; 19 G/133ML; G/133ML
1 ENEMA RECTAL ONCE AS NEEDED
Status: ACTIVE | OUTPATIENT
Start: 2019-01-01 | End: 2019-01-01

## 2019-01-01 RX ORDER — SODIUM CHLORIDE 9 MG/ML
INJECTION, SOLUTION INTRAVENOUS CONTINUOUS PRN
Status: DISCONTINUED | OUTPATIENT
Start: 2019-01-01 | End: 2019-01-01 | Stop reason: SURG

## 2019-01-01 RX ORDER — HYDROCODONE BITARTRATE AND ACETAMINOPHEN 5; 325 MG/1; MG/1
1 TABLET ORAL 2 TIMES DAILY PRN
Status: DISCONTINUED | OUTPATIENT
Start: 2019-01-01 | End: 2019-01-01

## 2019-01-01 RX ORDER — LACTULOSE 10 G/15ML
30 SOLUTION ORAL 2 TIMES DAILY
Status: DISCONTINUED | OUTPATIENT
Start: 2019-01-01 | End: 2019-01-01

## 2019-01-01 RX ORDER — FUROSEMIDE 40 MG/1
40 TABLET ORAL
Status: DISCONTINUED | OUTPATIENT
Start: 2019-01-01 | End: 2019-01-01

## 2019-01-01 RX ORDER — MELATONIN
325 DAILY
Status: DISCONTINUED | OUTPATIENT
Start: 2019-01-01 | End: 2019-01-01

## 2019-01-01 RX ORDER — POLYETHYLENE GLYCOL 3350 17 G/17G
17 POWDER, FOR SOLUTION ORAL DAILY
Status: DISCONTINUED | OUTPATIENT
Start: 2019-01-01 | End: 2019-01-01

## 2019-01-01 RX ORDER — LIDOCAINE 50 MG/G
2 PATCH TOPICAL DAILY
Qty: 60 PATCH | Refills: 0 | Status: SHIPPED | OUTPATIENT
Start: 2019-01-01

## 2019-01-01 RX ORDER — BISACODYL 10 MG
10 SUPPOSITORY, RECTAL RECTAL DAILY PRN
Qty: 10 SUPPOSITORY | Refills: 0 | Status: SHIPPED | OUTPATIENT
Start: 2019-01-01 | End: 2019-01-01 | Stop reason: ALTCHOICE

## 2019-01-01 RX ORDER — SODIUM PHOSPHATE, DIBASIC AND SODIUM PHOSPHATE, MONOBASIC 7; 19 G/133ML; G/133ML
1 ENEMA RECTAL ONCE AS NEEDED
Status: COMPLETED | OUTPATIENT
Start: 2019-01-01 | End: 2019-01-01

## 2019-01-01 RX ORDER — FUROSEMIDE 10 MG/ML
20 INJECTION INTRAMUSCULAR; INTRAVENOUS
Status: DISCONTINUED | OUTPATIENT
Start: 2019-01-01 | End: 2019-01-01

## 2019-01-01 RX ORDER — LACTULOSE 10 G/15ML
30 SOLUTION ORAL 2 TIMES DAILY
Qty: 1 BOTTLE | Refills: 0 | Status: SHIPPED | OUTPATIENT
Start: 2019-01-01 | End: 2019-01-01

## 2019-01-01 RX ORDER — CEFAZOLIN SODIUM/WATER 2 G/20 ML
SYRINGE (ML) INTRAVENOUS AS NEEDED
Status: DISCONTINUED | OUTPATIENT
Start: 2019-01-01 | End: 2019-01-01 | Stop reason: SURG

## 2019-01-01 RX ORDER — BISACODYL 10 MG
10 SUPPOSITORY, RECTAL RECTAL
Status: DISCONTINUED | OUTPATIENT
Start: 2019-01-01 | End: 2019-01-01

## 2019-01-01 RX ORDER — DOCUSATE SODIUM 100 MG/1
100 CAPSULE, LIQUID FILLED ORAL 2 TIMES DAILY
Status: DISCONTINUED | OUTPATIENT
Start: 2019-01-01 | End: 2019-01-01

## 2019-01-01 RX ORDER — DOCUSATE SODIUM 100 MG/1
100 CAPSULE, LIQUID FILLED ORAL 2 TIMES DAILY
Qty: 60 CAPSULE | Refills: 0 | Status: ON HOLD | OUTPATIENT
Start: 2019-01-01 | End: 2020-01-01

## 2019-01-01 RX ORDER — BISACODYL 10 MG
10 SUPPOSITORY, RECTAL RECTAL 2 TIMES DAILY PRN
Status: DISCONTINUED | OUTPATIENT
Start: 2019-01-01 | End: 2019-01-01

## 2019-01-01 RX ORDER — HYDROCODONE BITARTRATE AND ACETAMINOPHEN 10; 325 MG/1; MG/1
1 TABLET ORAL EVERY 8 HOURS PRN
Status: DISCONTINUED | OUTPATIENT
Start: 2019-01-01 | End: 2019-01-01

## 2019-01-01 RX ORDER — MORPHINE SULFATE 4 MG/ML
4 INJECTION, SOLUTION INTRAMUSCULAR; INTRAVENOUS EVERY 2 HOUR PRN
Status: DISCONTINUED | OUTPATIENT
Start: 2019-01-01 | End: 2019-01-01

## 2019-01-01 RX ORDER — SENNA AND DOCUSATE SODIUM 50; 8.6 MG/1; MG/1
2 TABLET, FILM COATED ORAL DAILY
Status: DISCONTINUED | OUTPATIENT
Start: 2019-01-01 | End: 2019-01-01

## 2019-01-01 RX ORDER — LACTULOSE 10 G/15ML
20 SOLUTION ORAL 4 TIMES DAILY
Status: DISCONTINUED | OUTPATIENT
Start: 2019-01-01 | End: 2019-01-01

## 2019-01-01 RX ORDER — ONDANSETRON 2 MG/ML
4 INJECTION INTRAMUSCULAR; INTRAVENOUS EVERY 6 HOURS PRN
Status: DISCONTINUED | OUTPATIENT
Start: 2019-01-01 | End: 2019-01-01

## 2019-01-01 RX ORDER — HYDROMORPHONE HYDROCHLORIDE 1 MG/ML
0.6 INJECTION, SOLUTION INTRAMUSCULAR; INTRAVENOUS; SUBCUTANEOUS EVERY 5 MIN PRN
Status: DISCONTINUED | OUTPATIENT
Start: 2019-01-01 | End: 2019-01-01 | Stop reason: HOSPADM

## 2019-01-01 RX ORDER — MORPHINE SULFATE 4 MG/ML
4 INJECTION, SOLUTION INTRAMUSCULAR; INTRAVENOUS EVERY 10 MIN PRN
Status: DISCONTINUED | OUTPATIENT
Start: 2019-01-01 | End: 2019-01-01 | Stop reason: HOSPADM

## 2019-01-01 RX ORDER — FLUTICASONE PROPIONATE 50 MCG
2 SPRAY, SUSPENSION (ML) NASAL DAILY
Status: DISCONTINUED | OUTPATIENT
Start: 2019-01-01 | End: 2019-01-01

## 2019-01-01 RX ORDER — MORPHINE SULFATE 2 MG/ML
INJECTION, SOLUTION INTRAMUSCULAR; INTRAVENOUS
Status: COMPLETED
Start: 2019-01-01 | End: 2019-01-01

## 2019-01-01 RX ORDER — HYDROCODONE BITARTRATE AND ACETAMINOPHEN 10; 325 MG/1; MG/1
1 TABLET ORAL ONCE
Status: COMPLETED | OUTPATIENT
Start: 2019-01-01 | End: 2019-01-01

## 2019-01-01 RX ADMIN — DEXAMETHASONE SODIUM PHOSPHATE 4 MG: 4 MG/ML VIAL (ML) INJECTION at 17:55:00

## 2019-01-01 RX ADMIN — LIDOCAINE HYDROCHLORIDE 50 MG: 10 INJECTION, SOLUTION EPIDURAL; INFILTRATION; INTRACAUDAL; PERINEURAL at 17:20:00

## 2019-01-01 RX ADMIN — SODIUM CHLORIDE: 9 INJECTION, SOLUTION INTRAVENOUS at 18:14:00

## 2019-01-01 RX ADMIN — SODIUM CHLORIDE: 9 INJECTION, SOLUTION INTRAVENOUS at 17:07:00

## 2019-01-01 RX ADMIN — ONDANSETRON 4 MG: 2 INJECTION INTRAMUSCULAR; INTRAVENOUS at 17:55:00

## 2019-01-01 RX ADMIN — CEFAZOLIN SODIUM/WATER 2 G: 2 G/20 ML SYRINGE (ML) INTRAVENOUS at 17:30:00

## 2019-02-25 PROBLEM — S46.919A: Status: ACTIVE | Noted: 2019-02-25

## 2019-02-25 PROCEDURE — 87086 URINE CULTURE/COLONY COUNT: CPT | Performed by: INTERNAL MEDICINE

## 2019-02-25 PROCEDURE — 87088 URINE BACTERIA CULTURE: CPT | Performed by: INTERNAL MEDICINE

## 2019-02-25 PROCEDURE — 87186 SC STD MICRODIL/AGAR DIL: CPT | Performed by: INTERNAL MEDICINE

## 2019-03-05 PROBLEM — M54.6 PAIN IN THORACIC SPINE: Status: ACTIVE | Noted: 2019-03-05

## 2019-03-12 PROCEDURE — 87086 URINE CULTURE/COLONY COUNT: CPT | Performed by: UROLOGY

## 2019-03-12 PROCEDURE — 87088 URINE BACTERIA CULTURE: CPT | Performed by: UROLOGY

## 2019-03-12 PROCEDURE — 82140 ASSAY OF AMMONIA: CPT | Performed by: INTERNAL MEDICINE

## 2019-03-12 PROCEDURE — 87186 SC STD MICRODIL/AGAR DIL: CPT | Performed by: UROLOGY

## 2019-03-19 PROCEDURE — 82140 ASSAY OF AMMONIA: CPT | Performed by: INTERNAL MEDICINE

## 2019-03-26 PROCEDURE — 87088 URINE BACTERIA CULTURE: CPT | Performed by: INTERNAL MEDICINE

## 2019-03-26 PROCEDURE — 81001 URINALYSIS AUTO W/SCOPE: CPT | Performed by: INTERNAL MEDICINE

## 2019-03-26 PROCEDURE — 87186 SC STD MICRODIL/AGAR DIL: CPT | Performed by: INTERNAL MEDICINE

## 2019-03-26 PROCEDURE — 87086 URINE CULTURE/COLONY COUNT: CPT | Performed by: INTERNAL MEDICINE

## 2019-04-04 PROBLEM — K75.81 LIVER CIRRHOSIS SECONDARY TO NASH (NONALCOHOLIC STEATOHEPATITIS) (HCC): Status: ACTIVE | Noted: 2018-11-21

## 2019-04-04 PROBLEM — K74.60 LIVER CIRRHOSIS SECONDARY TO NASH (NONALCOHOLIC STEATOHEPATITIS) (HCC): Status: ACTIVE | Noted: 2018-11-21

## 2019-04-04 PROBLEM — N30.01 ACUTE CYSTITIS WITH HEMATURIA: Status: ACTIVE | Noted: 2019-04-04

## 2019-04-04 PROBLEM — S22.060A COMPRESSION FRACTURE OF T7 VERTEBRA (HCC): Status: ACTIVE | Noted: 2019-04-04

## 2019-04-26 ENCOUNTER — HOSPITAL ENCOUNTER (OUTPATIENT)
Facility: HOSPITAL | Age: 69
Setting detail: HOSPITAL OUTPATIENT SURGERY
Discharge: HOME | End: 2019-04-26
Attending: INTERNAL MEDICINE | Admitting: INTERNAL MEDICINE
Payer: COMMERCIAL

## 2019-04-26 ENCOUNTER — ANESTHESIA EVENT (OUTPATIENT)
Dept: ENDOSCOPY | Facility: HOSPITAL | Age: 69
End: 2019-04-26
Payer: COMMERCIAL

## 2019-04-26 ENCOUNTER — ANESTHESIA (OUTPATIENT)
Dept: ENDOSCOPY | Facility: HOSPITAL | Age: 69
End: 2019-04-26
Payer: COMMERCIAL

## 2019-04-26 DIAGNOSIS — D50.9 IRON DEFICIENCY ANEMIA, UNSPECIFIED IRON DEFICIENCY ANEMIA TYPE: ICD-10-CM

## 2019-04-26 DIAGNOSIS — K57.90 DIVERTICULOSIS: ICD-10-CM

## 2019-04-26 DIAGNOSIS — K64.9 RECTAL VARICES: Primary | ICD-10-CM

## 2019-04-26 DIAGNOSIS — K63.5 POLYP OF COLON, UNSPECIFIED PART OF COLON, UNSPECIFIED TYPE: ICD-10-CM

## 2019-04-26 DIAGNOSIS — K64.9 HEMORRHOIDS, UNSPECIFIED HEMORRHOID TYPE: ICD-10-CM

## 2019-04-26 PROCEDURE — 88305 TISSUE EXAM BY PATHOLOGIST: CPT | Performed by: INTERNAL MEDICINE

## 2019-04-26 PROCEDURE — 0DBL8ZX EXCISION OF TRANSVERSE COLON, VIA NATURAL OR ARTIFICIAL OPENING ENDOSCOPIC, DIAGNOSTIC: ICD-10-PCS | Performed by: INTERNAL MEDICINE

## 2019-04-26 RX ORDER — NALOXONE HYDROCHLORIDE 0.4 MG/ML
80 INJECTION, SOLUTION INTRAMUSCULAR; INTRAVENOUS; SUBCUTANEOUS AS NEEDED
Status: DISCONTINUED | OUTPATIENT
Start: 2019-04-26 | End: 2019-04-26

## 2019-04-26 RX ORDER — SODIUM CHLORIDE, SODIUM LACTATE, POTASSIUM CHLORIDE, CALCIUM CHLORIDE 600; 310; 30; 20 MG/100ML; MG/100ML; MG/100ML; MG/100ML
INJECTION, SOLUTION INTRAVENOUS CONTINUOUS
Status: DISCONTINUED | OUTPATIENT
Start: 2019-04-26 | End: 2019-04-26

## 2019-04-26 RX ORDER — LIDOCAINE HYDROCHLORIDE 10 MG/ML
INJECTION, SOLUTION EPIDURAL; INFILTRATION; INTRACAUDAL; PERINEURAL AS NEEDED
Status: DISCONTINUED | OUTPATIENT
Start: 2019-04-26 | End: 2019-04-26 | Stop reason: SURG

## 2019-04-26 RX ADMIN — LIDOCAINE HYDROCHLORIDE 50 MG: 10 INJECTION, SOLUTION EPIDURAL; INFILTRATION; INTRACAUDAL; PERINEURAL at 11:42:00

## 2019-04-26 RX ADMIN — SODIUM CHLORIDE, SODIUM LACTATE, POTASSIUM CHLORIDE, CALCIUM CHLORIDE: 600; 310; 30; 20 INJECTION, SOLUTION INTRAVENOUS at 11:39:00

## 2019-04-26 RX ADMIN — SODIUM CHLORIDE, SODIUM LACTATE, POTASSIUM CHLORIDE, CALCIUM CHLORIDE: 600; 310; 30; 20 INJECTION, SOLUTION INTRAVENOUS at 12:25:00

## 2019-04-26 NOTE — ANESTHESIA PREPROCEDURE EVALUATION
Anesthesia PreOp Note    HPI:     Jalil Galaviz is a 80year old female who presents for preoperative consultation requested by: Gillian Valles MD    Date of Surgery: 4/26/2019    Procedure(s):  COLONOSCOPY  Indication: Iron deficiency anemia, unspecifie ENDOSCOPY   • HC CLOSED TX TIBIAL SHAFT FRACTURE W MANIPULATION     • HERNIA SURGERY  69/17/2692    umbilical hernia   • HERNIA UMBILICAL REPAIR ADULT N/A 9/19/2018    Performed by Tootie Callejas MD at [facility] OR   • HYSTERECTOMY     • SMALL BOWEL [address] [facility] BLEEDING  Ambien [Zolpidem]       CONFUSION    Family History   Problem Relation Age of Onset   • Cancer Father 80        colon ca   • Heart Disorder Mother 61        mi   • Cancer Sister 61        breast ca   • Breast Cancer Sister 62        dx at age 62 Value Date    WBC 4.24 03/12/2019    RBC 2.68 (L) 03/12/2019    HGB 7.9 (L) 03/19/2019    HCT 25.8 (L) 03/19/2019    MCV 88.1 03/12/2019    MCH 27.2 03/12/2019    MCHC 30.9 (L) 03/12/2019    RDW 17.1 (H) 03/12/2019     03/12/2019     Lab Results   C

## 2019-04-26 NOTE — OPERATIVE REPORT
COLONOSCOPY REPORT    Patient Name:  Gale Bautista Record #: [id number]  YOB: 1950  Date of Procedure: 4/26/2019    Referring physician: Kaci Bermudez MD    Surgeon:  Ad Castañeda MD    Pre-op diagnosis: anemia, iron deficie Fair (semisolid stool could not be suctioned, but >90% of mucosa seen)  2 Good (clear liquid covering up to 25% of mucosa, but >90% of mucosa seen)  1 Excellent (>95% of mucosa seen)

## 2019-04-26 NOTE — H&P
RE-PROCEDURE UPDATE    HPI: Kitty Barrios is a 80year old female. 12/12/1950. Patient presents for a colonoscopy.  Patient with chronic iron deficient anemia, prior attempt at colonoscopy with poor prep    ALLERGIES:   Advil [Ibuprofen]       BLEEDING

## 2019-04-26 NOTE — ANESTHESIA POSTPROCEDURE EVALUATION
Patient: Bianca Guerrero    Procedure Summary     Date:  04/26/19 Room / Location:  [facility] ENDOSCOPY 01 / [facility] ENDOSCOPY    Anesthesia Start:  6423 Anesthesia Stop:      Procedure:  COLONOSCOPY (N/A ) Diagnosis:       Iron deficiency anemia, unspecified iron de

## 2019-04-27 VITALS
SYSTOLIC BLOOD PRESSURE: 131 MMHG | HEART RATE: 72 BPM | RESPIRATION RATE: 20 BRPM | OXYGEN SATURATION: 99 % | HEIGHT: 67 IN | BODY MASS INDEX: 25.11 KG/M2 | WEIGHT: 160 LBS | DIASTOLIC BLOOD PRESSURE: 56 MMHG

## 2019-05-31 PROBLEM — N30.00 ACUTE CYSTITIS WITHOUT HEMATURIA: Status: ACTIVE | Noted: 2019-04-04

## 2019-05-31 PROBLEM — L98.9 SKIN LESION OF CHEEK: Status: ACTIVE | Noted: 2019-05-31

## 2019-05-31 PROBLEM — R30.0 DYSURIA: Status: ACTIVE | Noted: 2018-12-04

## 2019-05-31 PROCEDURE — 82140 ASSAY OF AMMONIA: CPT | Performed by: INTERNAL MEDICINE

## 2019-05-31 PROCEDURE — 36415 COLL VENOUS BLD VENIPUNCTURE: CPT | Performed by: INTERNAL MEDICINE

## 2019-05-31 PROCEDURE — 87088 URINE BACTERIA CULTURE: CPT | Performed by: INTERNAL MEDICINE

## 2019-05-31 PROCEDURE — 87186 SC STD MICRODIL/AGAR DIL: CPT | Performed by: INTERNAL MEDICINE

## 2019-05-31 PROCEDURE — 87086 URINE CULTURE/COLONY COUNT: CPT | Performed by: INTERNAL MEDICINE

## 2019-06-13 PROBLEM — N30.90 CYSTITIS: Status: ACTIVE | Noted: 2019-04-04

## 2019-07-24 PROBLEM — R39.9 LOWER URINARY TRACT SYMPTOMS (LUTS): Status: ACTIVE | Noted: 2019-01-01

## 2019-08-20 PROBLEM — N39.0 URINARY TRACT INFECTION WITHOUT HEMATURIA: Status: ACTIVE | Noted: 2019-01-01

## 2019-08-20 PROBLEM — N39.0 URINARY TRACT INFECTION WITHOUT HEMATURIA, SITE UNSPECIFIED: Status: ACTIVE | Noted: 2019-01-01

## 2019-08-20 PROBLEM — S22.060S COMPRESSION FRACTURE OF T7 VERTEBRA, SEQUELA: Status: ACTIVE | Noted: 2019-01-01

## 2019-08-20 PROBLEM — M54.9 INTRACTABLE BACK PAIN: Status: ACTIVE | Noted: 2019-01-01

## 2019-08-20 NOTE — ED PROVIDER NOTES
Patient Seen in: [facility] AND [facility] Emergency Department    History   Patient presents with:  Fall (musculoskeletal, neurologic)    Stated Complaint: fall    HPI    43-year-old female with history of cirrhosis of the liver, esophageal varices, and chronic 1949        Years since quittin.2      Smokeless tobacco: Never Used    Alcohol use: No      Alcohol/week: 0.0 standard drinks    Drug use:  No             Review of Systems    Positive for stated complaint: fall  Other systems are as noted in HPI following components:    Clarity Urine Cloudy (*)     Protein Urine 30  (*)     Blood Urine Small (*)     Nitrite Urine Positive (*)     Urobilinogen Urine 4.0 (*)     Leukocyte Esterase Urine Large (*)     WBC Urine 228 (*)     RBC URINE 3 (*)     Bacteri Medication List              Present on Admission  Date Reviewed: 8/16/2019          ICD-10-CM Noted POA    Urinary tract infection without hematuria N39.0 8/20/2019 Unknown

## 2019-08-20 NOTE — ED NOTES
Orders for admission, patient is aware of plan and ready to go upstairs. Any questions, please call ED RN Gaby Gonzalez  at extension [phone].

## 2019-08-20 NOTE — ED INITIAL ASSESSMENT (HPI)
Patient here with c/o back pain s/p fall yesterday. Recently diagnosed with compression fractures last Friday, feels fractures have worsening. Hx of cirrhosis of liver.

## 2019-08-20 NOTE — H&P
[facility] Hospitalist H&P       CC: Patient presents with:  Fall (musculoskeletal, neurologic)       PCP: Yesenia Lowe MD    History of Present Illness:   Ms. Jayme Castleman is a 80year old female with PMH sig PEREZ cirrhosis with EV, ascites, recurrent UTI, spina Use      Smoking status: Former Smoker        Packs/day: 1.00        Years: 20.00        Pack years: 21        Quit date: 1950        Years since quittin.2      Smokeless tobacco: Never Used    Alcohol use: No      Alcohol/week: 0.0 standard drin Views) (cpt=72072)    Result Date: 8/20/2019  CONCLUSION:  1. Mild L1 superior endplate vertebral compression fracture of uncertain age-probably subacute or chronic. 2. Moderate chronic L3 compression fracture with post kyphoplasty changes.  3. Mild chronic not have tense ascites on exam  -histor of HE, continue lactulose  -holding lasix and alejandro given hypokalemia and hyponatremia, favor IVF for 12 hours, can resume in am  -monitor closely      Anemia, iron deficiency  -hgb baseline ~8-9  -heg 10 here     FN

## 2019-08-21 NOTE — PROGRESS NOTES
[facility] Hospitalist Progress Note     PCP: Patti Turner MD    CC: Follow up       Assessment/Plan:   a 80year old female with PMH sig PEREZ cirrhosis with EV, ascites, recurrent UTI, spinal stenosis, insomnia, anemia, who presents following a fall with abdominal fullness that cuases pain under her ribs  Denies N/V/D  +flatus, no dysuria currently       Objective     OBJECTIVE:  Temp:  [98.2 °F (36.8 °C)-100.6 °F (38.1 °C)] 99.2 °F (37.3 °C)  Pulse:  [76-90] 76  Resp:  [18-22] 20  BP: (123-152)/(57-69) 12 8.1*   MG  --  2.4   * 105*       Recent Labs   Lab 08/21/19  0700   ALT 23   AST 36   ALB 2.4*       No results for input(s): PGLU in the last 168 hours. No results for input(s): TROP in the last 168 hours.     Imaging:Xr Thoracic Spine (3 Views) superior endplate compression fracture of uncertain age-probably subacute or chronic. 2. Moderate chronic L3 vertebral body compression fracture with post kyphoplasty changes. 3. L5-S1:  Grade 2 spondylolisthesis related to bilateral L5 pars defects.   Ryan Roper

## 2019-08-21 NOTE — OCCUPATIONAL THERAPY NOTE
OCCUPATIONAL THERAPY EVALUATION - INPATIENT     Room Number: 537/537-A  Evaluation Date: 8/21/2019  Type of Evaluation: Initial       Physician Order: IP Consult to Occupational Therapy  Reason for Therapy: ADL/IADL Dysfunction and Discharge Planning    OC tolerance, she may be able to d/c to home with [facility] services. DISCHARGE RECOMMENDATIONS  OT Discharge Recommendations: ([facility] vs HH pending progress)  OT Device Recommendations: None    PLAN  OT Treatment Plan: Balance activities; Energy conservation/work s ambulating with no device- reports recentlu using SPC for mobility 2/2 weakness and pain    SUBJECTIVE  When can I get more norco?    OCCUPATIONAL THERAPY EXAMINATION      OBJECTIVE  Precautions: Spine  Fall Risk: High fall risk    PAIN ASSESSMENT  Rating: complete functional transfer with Mod I with LRAD   Comment:     Patient will complete toileting with Mod I   Comment:     Patient will tolerate standing for 5-8 minutes in prep for adls with Mod I    Comment:    Patient will complete morning ADL routine M

## 2019-08-21 NOTE — CONSULTS
[facility]    Report of Consultation    Leatha Toledo Patient Status:  Inpatient    1950 MRN [id number]   Location [facility] 5SW/SE Attending Michael Brody MD   Hosp Day # 1 PCP Natty Cadena MD     Date of Admissi COLONOSCOPY N/A 10/26/2018    Performed by Mateus Harry MD at [address] [place] [address] ENDOSCOPY   • ESOPHAGOGASTRODUODENOSCOPY (EGD) N/A 10/26/2018    Performed by Mateus Harry MD at [address] [place] [address] ENDOSCOPY   • [place] CefTRIAXone Sodium (ROCEPHIN) 1 g in sodium chloride 0.9% 100 mL MBP/ADD-vantage 1 g Intravenous Q24H       Medications Prior to Admission:  gabapentin (NEURONTIN) 100 MG Oral Cap Take 1 capsule (100 mg total) by mouth 3 (three) times daily.    traMADol H temperature source Oral, resp. rate 22, height 5' 7\" (1.702 m), weight 169 lb 4.8 oz (76.8 kg), SpO2 97 %.   GENERAL: well developed, in no apparent distress  SKIN: no rashes,no suspicious lesions  HEENT: atraumatic, normocephalic, oropharynx clear  NECK: age.  Moderate L3 vertebral body compression fracture with post kyphoplasty changes. Mild L1 superior endplate vertebral compression fracture. Mild chronic loss of height  of T7 vertebral body.  DISC SPACES: Partly visualized degenerative disc disease in heterogenous marrow signal. No significant spinal canal or foraminal stenosis. Other: None. IMPRESSION: 1. Subacute compression fracture superior T6 endplate with up to 65% loss of vertebral body height.  No retropulsion into the spinal canal. 2.  Fredie Orlando spinal canal stenosis. L5-S1: There is 1.0 cm anterolisthesis of L5 on S1 due to bilateral L5 spondylolysis. There is severe intervertebral disc space narrowing L5-S1 with uncovering of pseudodisc bulge. There is no significant spinal canal stenosis.  There S1 related to L5 pars defects. VERTEBRAL BODIES:   Mild L1 superior endplate vertebral compression fracture of uncertain age. Moderate L3 superior endplate compression fracture with post kyphoplasty.   Wavy contour to the vertebral body endplates in the lo

## 2019-08-21 NOTE — PLAN OF CARE
Problem: Patient Centered Care  Goal: Patient preferences are identified and integrated in the patient's plan of care  Description  Interventions:  - What would you like us to know as we care for you?  \"I live at home with my sister\"  - Provide timely, ADULT  Goal: Electrolytes maintained within normal limits  Description  INTERVENTIONS:  - Monitor labs and rhythm and assess patient for signs and symptoms of electrolyte imbalances  - Administer electrolyte replacement as ordered  - Monitor response to el encourage patient to participate in ADLs to maximize function  - Promote sitting position while performing ADLs such as feeding, grooming, and bathing  - Educate and encourage patient/family in tolerated functional activity level and precautions during mike Encourage toileting schedule  Outcome: Progressing     VSS. Norco and Tramadol PRN for pain - heat packs help as well. Ordered aqua K heating pad. MRI this evening. Pt also w/ increased abdominal distention and dyspnea w/ exertion.  Pt went for paracentesis

## 2019-08-21 NOTE — CONSULTS
[facility]    Report of Consultation    Jose Gorman Patient Status:  Inpatient    1950 MRN [id number]   Location [facility] 5SW/SE Attending Jaki Casas MD   Hosp Day # 1 PCP Sandy Choi MD     Date of Admis reports that she does not drink alcohol or use drugs.     Allergies:    Advil [Ibuprofen]       BLEEDING  Ambien [Zolpidem]       CONFUSION    Medications:    Current Facility-Administered Medications:   •  spironolactone (ALDACTONE) tab, 50 mg, Oral, Daily and turgor.   Lymphatic:  Back pain with palpation L1 spine      Laboratory Data:  Lab Results   Component Value Date    WBC 6.8 08/21/2019    HGB 9.3 08/21/2019    HCT 27.9 08/21/2019    .0 08/21/2019    CREATSERUM 0.76 08/21/2019    BUN 19 08/21/20 Muscle strain of scapular region, unspecified laterality, initial encounter     Pain in thoracic spine     Cystitis     Compression fracture of T7 vertebra (HCC)     Skin lesion of cheek     Lower urinary tract symptoms (LUTS)     Urinary tract infection w

## 2019-08-21 NOTE — PHYSICAL THERAPY NOTE
PHYSICAL THERAPY EVALUATION - INPATIENT     Room Number: 537/537-A  Evaluation Date: 8/21/2019  Type of Evaluation: Initial   Physician Order: PT Eval and Treat    Presenting Problem: UTI; intractable back pain;  2 falls at home; hx chronic L3 (s/p kyphop pain. She also had 2 falls at home prior to this admission. She lives with her sister who is older than she is and unable to assist pt physically.  If pt's pain improves over the next few day and she is capable of completing functional mobility w/o assist, Cirrhosis of liver (HCC)    • Esophageal varices (HCC)    • Insomnia    • Recurrent UTI        Past Surgical History  Past Surgical History:   Procedure Laterality Date   • CHOLECYSTECTOMY     • COLONOSCOPY N/A 4/26/2019    Performed by Efrain Wilkins MD have...  -   Turning over in bed (including adjusting bedclothes, sheets and blankets)?: A Little   -   Sitting down on and standing up from a chair with arms (e.g., wheelchair, bedside commode, etc.): A Little   -   Moving from lying on back to sitting on Status    Goal #6    Goal #6  Current Status

## 2019-08-21 NOTE — PLAN OF CARE
Problem: Patient Centered Care  Goal: Patient preferences are identified and integrated in the patient's plan of care  Description  Interventions:  - What would you like us to know as we care for you?  \"I live at home with my sister\"  - Provide timely, ADULT  Goal: Electrolytes maintained within normal limits  Description  INTERVENTIONS:  - Monitor labs and rhythm and assess patient for signs and symptoms of electrolyte imbalances  - Administer electrolyte replacement as ordered  - Monitor response to el unsuccessful or patient reports new pain  - Anticipate increased pain with activity and pre-medicate as appropriate  Outcome: Progressing     Problem: RISK FOR INFECTION - ADULT  Goal: Absence of fever/infection during anticipated neutropenic period  Descr

## 2019-08-21 NOTE — IMAGING NOTE
Pt to ultrasound room # 3 at 1627. Scouts taken by [employer].    Hx reviewed; labs noted. Signed consent for US-Guided Paracentesis in chart. .    1639: BONITA Moe at bedside. Scanning completed.       1641:  Procedure cancelled du

## 2019-08-22 NOTE — PROGRESS NOTES
[facility]  Gastroenterology Progress Note    Richi Almonte Patient Status:  Inpatient    1950 MRN [id number]   Location [facility] 5SW/SE Attending Ninoska Castañeda MD   Hosp Day # 2 PCP Patti Turner MD     Subjective:  Denise Victor compression fracture with kyphoplasty cement. 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  Multilevel degenerative changes and foraminal narrowing as described above.   Dictated by (CST): Arvin Fong MD on 8/21/2019 at 18:0 unspecified laterality, initial encounter     Pain in thoracic spine     Cystitis     Compression fracture of T7 vertebra (HCC)     Skin lesion of cheek     Lower urinary tract symptoms (LUTS)     Urinary tract infection without hematuria     Urinary tract

## 2019-08-22 NOTE — CONSULTS
[facility]    Report of Consultation    Silverio Sensor Patient Status:  Inpatient    1950 MRN [id number]   Location [facility] 5SW/SE Attending Aby Moreno MD   Hosp Day # 2 PCP Kat Prieto MD     Date of Admissi 56/99/7328    umbilical hernia   • HERNIA UMBILICAL REPAIR ADULT N/A 9/19/2018    Performed by Dimple Cordova MD at [facility]   • HYSTERECTOMY     • [address] N/A 9/19/2018    Performed by Dimple Cordova MD at [place] [address] PRN   ondansetron HCl (ZOFRAN) injection 4 mg 4 mg Intravenous Q6H PRN   CefTRIAXone Sodium (ROCEPHIN) 1 g in sodium chloride 0.9% 100 mL MBP/ADD-vantage 1 g Intravenous Q24H       Medications Prior to Admission:  gabapentin (NEURONTIN) 100 MG Oral [facility] apparent distress, and appears stated age  EYES:  Lids and lashes normal, sclera clear, conjunctiva normal  ENT:  Normocephalic, without obvious abnormality, atraumatic  NECK:  Supple, symmetrical, trachea midline, no adenopathy, thyroid symmetric, not enl L3 compression fracture with post kyphoplasty changes. 3. Mild chronic loss of height of T7 vertebral body. 4. Degenerative changes in the spine.     Dictated by (CST): Melody Serrano MD on 8/20/2019 at 16:53     Approved by (CST): Melody Serrano MD on 8/20 Horacio Montero MD on 8/21/2019 at 19:08           Colonosocpy 10/26/18 - Dr. Quinn Castillo  Procedure #2:                 With the patient still in the left lateral decubitus position the colonoscope was introduced into the rectum and advanced under direct vis

## 2019-08-22 NOTE — PLAN OF CARE
Problem: Patient Centered Care  Goal: Patient preferences are identified and integrated in the patient's plan of care  Description  Interventions:  - What would you like us to know as we care for you?  \"I live at home with my sister\"  - Provide timely, for suctioning and perform as needed  - Assess and instruct to report SOB or any respiratory difficulty  - Respiratory Therapy support as indicated  - Manage/alleviate anxiety  - Monitor for signs/symptoms of CO2 retention  8/22/2019 0503 by Shakira Christianson patient/family  8/22/2019 0503 by Sumanth Herzog RN  Outcome: Progressing  8/22/2019 0502 by Sumanth Herzog, RN  Outcome: Progressing     Problem: Impaired Functional Mobility  Goal: Achieve highest/safest level of mobility/gait  Description  Intervention pain, received pain medication, aqua K pad being used as well.   8/22/2019 0502 by Domenic Baker RN  Outcome: Progressing  Note:   Patient complained of pain received medication, aqua K pad been used as well     Problem: RISK FOR INFECTION - ADULT  Goal:

## 2019-08-22 NOTE — BRIEF PROCEDURE NOTE
[facility]  Procedure Note    Jalil Anastacia Patient Status:  Inpatient    1950 MRN [id number]   Location [facility] Attending More Velasquez MD   Hosp Day # 2 PCP Judy Grant MD     Proced

## 2019-08-22 NOTE — CM/SW NOTE
08/22/19 1300   CM/SW Screening   Referral [address] staff; Chart review;Nursing rounds   Patient's Mental Status Alert;Oriented   Patient's [address]   Number of Levels in Home 1   Number of Stair in Home 3

## 2019-08-22 NOTE — ANESTHESIA PROCEDURE NOTES
Airway  Date/Time: 8/22/2019 5:23 PM  Urgency: elective    Airway not difficult    General Information and Staff    Patient location during procedure: cath lab  Anesthesiologist: Jerrod Peres MD  Performed: anesthesiologist     Indications and Patient

## 2019-08-22 NOTE — PHYSICAL THERAPY NOTE
PHYSICAL THERAPY TREATMENT NOTE - INPATIENT     Room Number: 537/537-A       Presenting Problem: UTI; intractable back pain;  2 falls at home; hx chronic L3 (s/p kyphoplasty) & T7 compression fx; mild L1 subacute or chronic compression fx; hx of non-alcoho education; Family education;Stair training;Stoop training;Strengthening;Balance training; Endurance; Energy conservation; Body mechanics; Coordination    SUBJECTIVE  Pt had a kyphoplasty 2 years ago.     OBJECTIVE  Precautions: Spine    WEIGHT BEARING RESTRICTIO self-stated goal is: not stated   Goal #1 Patient is able to demonstrate supine - sit EOB @ level: modified independent      Goal #1   Current Status SBA   Goal #2 Patient is able to demonstrate transfers Sit to/from Stand at assistance level: modified ind

## 2019-08-22 NOTE — DIETARY NOTE
ADULT NUTRITION INITIAL ASSESSMENT    Pt is at moderate nutrition risk. Pt does not meet malnutrition criteria.       RECOMMENDATIONS TO MD:  See Nutrition Intervention     NUTRITION DIAGNOSIS/PROBLEM:  Inadequate oral intake related to decreased ability t IBW  Usual Body Wt: 180-185 lbs       92-94% UBW  WEIGHT HISTORY:  Patient Weight(s) for the past 336 hrs:   Weight   08/20/19 1932 76.8 kg (169 lb 4.8 oz)   08/20/19 1413 76.7 kg (169 lb 1.5 oz)     Wt Readings from Last 10 Encounters:  08/20/19 : 76.8 kg Regular/General prior to NPO  Oral Supplements: as above  ESTIMATED NUTRITION NEEDS:  Calories: 9094-6523 calories/day (22-25 calories per kg Current wt)  Protein: 77 grams protein/day (~1.0 grams protein per kg Current wt)    MONITOR AND EVALUATE/NUTRITIO

## 2019-08-22 NOTE — ANESTHESIA POSTPROCEDURE EVALUATION
Patient: Dyllan Mack    Procedure Summary     Date:  08/22/19 Room / Location:  [facility]    Anesthesia Start:  2524 Anesthesia Stop:  1823    Procedure:  IR KYPHOPLASTY Diagnosis:  (L1 compresson fracture on Xr, not seen o

## 2019-08-22 NOTE — ANESTHESIA PREPROCEDURE EVALUATION
Anesthesia PreOp Note    HPI:     Yusef Potts is a 80year old female who presents for preoperative consultation requested by: * No surgeons listed *    Date of Surgery: 8/22/2019    * No procedures listed *  Indication: * No pre-op diagnosis entered * Cirrhosis of liver (HCC)    • Esophageal varices (HCC)    • Insomnia    • Recurrent UTI        Past Surgical History:   Procedure Laterality Date   • CHOLECYSTECTOMY     • COLONOSCOPY N/A 4/26/2019    Performed by Robert Louis MD at [facility] ENDOSCOPY   • CO (50 mg total) by mouth daily. Disp: 90 tablet Rfl: 1 Taking   furosemide 40 MG Oral Tab Take 1 tablet (40 mg total) by mouth daily. Disp: 90 tablet Rfl: 3 Taking   lidocaine 5 % External Patch Place 2 patches onto the skin daily.  Disp: 60 patch Rfl: 0 Alma Smoker (TYLENOL) tab 650 mg 650 mg Oral Q6H PRN Mallory Davenport  mg at 08/20/19 2105    ondansetron HCl (ZOFRAN) injection 4 mg 4 mg Intravenous Q6H PRN Mallory Davenport MD     CefTRIAXone Sodium (ROCEPHIN) 1 g in sodium chloride 0.9% 100 mL MBP/ADD-vantage 1 g Damian Renee meetings of clubs or organizations: Not on file        Relationship status: Not on file      Intimate partner violence:        Fear of current or ex partner: Not on file        Emotionally abused: Not on file        Physically abused: Not on file        Fo Evaluation     Patient summary reviewed and Nursing notes reviewed    History of anesthetic complications   Airway   Mallampati: II  TM distance: >3 FB  Neck ROM: full  Dental    (+) lower dentures and upper dentures    Pulmonary - normal exam     ROS comm

## 2019-08-22 NOTE — PROGRESS NOTES
[facility] Hospitalist Progress Note     PCP: Ly Jenkins MD    CC: Follow up       Assessment/Plan:   a 80year old female with PMH sig PEREZ cirrhosis with EV, ascites, recurrent UTI, spinal stenosis, insomnia, anemia, who presents following a fall with follow patient while in house     Patient and/or patient's family given opportunity to ask questions and note understanding and agreeing with therapeutic plan as outlined 8/22/19      Total Time spent with patient and coordinating care:  >35 minutes 8/22/1 Saline Flush, acetaminophen, ondansetron HCl    Data Review:       Labs:     Recent Labs   Lab 08/20/19  1552 08/21/19  0659 08/21/19  1556 08/22/19  0710   WBC 7.8 6.8  --  8.8   HGB 10.0* 9.3*  --  9.5*   .3* 102.6*  --  102.1*   .0* 115.0* Dictated by (CST): Tom Ogden MD on 8/21/2019 at 18:04     Approved by (CST): Tom Ogden MD on 8/21/2019 at 18:10          Mri Spine Lumbar (cpt=72148)    Result Date: 8/13/2019  IMPRESSION: 1.   At L5-S1 there is 1.0 cm spondylolisthesis with ileus versus bowel obstruction. Continued follow up recommended.   Dictated by (CST): Dia Hernandez MD on 8/21/2019 at 19:02     Approved by (CST): Dia Hernandez MD on 8/21/2019 at 19:08

## 2019-08-22 NOTE — ANESTHESIA PROCEDURE NOTES
Peripheral IV  Date/Time: 8/22/2019 5:15 PM  Inserted by: Joceline Andrea MD    Placement  Needle size: 20 G  Laterality: right  Location: hand  Site prep: alcohol  Technique: anatomical landmarks  Attempts: 1

## 2019-08-23 NOTE — CM/SW NOTE
Ins auth approved per [employer] # [id number] for [facility] when medically cleared. LiaisonHeber notified, CM will remain available for contined needs    [phone]   Per RN, MD has not cleared pt medically for discharge due to GI status.   Possibly will be

## 2019-08-23 NOTE — PLAN OF CARE
Problem: Patient Centered Care  Goal: Patient preferences are identified and integrated in the patient's plan of care  Description  Interventions:  - What would you like us to know as we care for you?  \"I live at home with my sister\"  - Provide timely, ADULT  Goal: Electrolytes maintained within normal limits  Description  INTERVENTIONS:  - Monitor labs and rhythm and assess patient for signs and symptoms of electrolyte imbalances  - Administer electrolyte replacement as ordered  - Monitor response to el encourage patient to participate in ADLs to maximize function  - Promote sitting position while performing ADLs such as feeding, grooming, and bathing  - Educate and encourage patient/family in tolerated functional activity level and precautions during mike Encourage toileting schedule  Outcome: Progressing     VSS. Norco and tramadol PRN for pain. Pt went for kyphoplasty this evening. Potassium coverage ordered per protocol. IV abx. Worked w/ PT. Pt using call light appropriately.

## 2019-08-23 NOTE — PROGRESS NOTES
[facility] Hospitalist Progress Note     PCP: Zahraa Steele MD    CC: Follow up       Assessment/Plan:   a 80year old female with PMH sig PEREZ cirrhosis with EV, ascites, recurrent UTI, spinal stenosis, insomnia, anemia, who presents following a fall with Karl Middleton M.D.  [facility] Hospitalist  Answering Service: [phone]        Subjective   Seen prior to kyphoplasty  Pain is stable   Today abd feels better, passing gas, no N/V  No CP or SOB      Objective     OBJECTIVE:  Temp:  [97.7 °F (3 08/21/19  0700 08/22/19  0710 08/23/19  0752   * 138 134*  --    K 3.3* 4.3 3.7 5.0    103 99  --    CO2 27.0 27.0 25.0  --    BUN 19* 19* 14  --    CREATSERUM 0.84 0.76 0.70  --    CA 8.4* 8.1* 8.5  --    MG  --  2.4  --   --    * 105* spondylolysis along with facet hypertrophy contributing to moderate bilateral foraminal narrowing with distortion of the exiting bilateral L5 nerve root. Clinical correlation for L5 radiculopathy is recommended.  2.  At L4-5 there is moderate-to-severe left

## 2019-08-23 NOTE — PROGRESS NOTES
[facility]    Progress Note    David Dutch Patient Status:  Inpatient    1950 MRN [id number]   Location [facility] 5SW/SE Attending Juli Malone MD   [address] Day # 3 PCP Mohamud Lauren MD        Subjective:   Karla Manzano UNIT/ML injection 5,000 Units 5,000 Units Subcutaneous 2 times per day   acetaminophen (TYLENOL) tab 650 mg 650 mg Oral Q6H PRN   ondansetron HCl (ZOFRAN) injection 4 mg 4 mg Intravenous Q6H PRN   CefTRIAXone Sodium (ROCEPHIN) 1 g in sodium chloride 0.9% 1 Value Date    WBC 8.8 08/22/2019    HGB 9.5 (L) 08/22/2019    HCT 29.0 (L) 08/22/2019    .0 (L) 08/22/2019    CREATSERUM 0.70 08/22/2019    BUN 14 08/22/2019     (L) 08/22/2019    K 5.0 08/23/2019    CL 99 08/22/2019    CO2 25.0 08/22/2019 Jose Lilly MD on 8/21/2019 at 19:02     Approved by (CST): Carlos Cottrell MD on 8/21/2019 at 19:08                         Ashli Lynch MD  8/23/2019

## 2019-08-23 NOTE — PLAN OF CARE
Problem: Patient Centered Care  Goal: Patient preferences are identified and integrated in the patient's plan of care  Description  Interventions:  - What would you like us to know as we care for you?  \"I live at home with my sister\"  - Provide timely, and perform as needed  - Assess and instruct to report SOB or any respiratory difficulty  - Respiratory Therapy support as indicated  - Manage/alleviate anxiety  - Monitor for signs/symptoms of CO2 retention  8/23/2019 1553 by Fernanda Dumont  Outcome: Pr Nisha Haynes  Outcome: Progressing  8/23/2019 1526 by Nisha Haynes  Outcome: Progressing     Problem: Impaired Functional Mobility  Goal: Achieve highest/safest level of mobility/gait  Description  Interventions:  - Assess patient's functional abil pt/family on patient safety including physical limitations  - Instruct pt to call for assistance with activity based on assessment  - Modify environment to reduce risk of injury  - Provide assistive devices as appropriate  - Consider OT/PT consult to john

## 2019-08-23 NOTE — PROGRESS NOTES
[facility]  Progress Note    Ailin Mccarthy Patient Status:  Inpatient    1950 MRN [id number]   Location [facility] 5SW/SE Attending Kaci Mora, [address] [facility] Day # 3 PCP Radha Silva MD       Subjective:   Back pain impro colon is distended, and multiple air-fluid levels are present on the upright view. Findings may reflect ileus versus bowel obstruction. Continued follow up recommended.   Dictated by (CST): Twyla San MD on 8/21/2019 at 19:02     Approved by (CST):

## 2019-08-23 NOTE — PLAN OF CARE
Problem: Patient Centered Care  Goal: Patient preferences are identified and integrated in the patient's plan of care  Description  Interventions:  - What would you like us to know as we care for you?  \"I live at home with my sister\"  - Provide timely, Spirometry  - Assess the need for suctioning and perform as needed  - Assess and instruct to report SOB or any respiratory difficulty  - Respiratory Therapy support as indicated  - Manage/alleviate anxiety  - Monitor for signs/symptoms of CO2 retention  8/ adequate protection for wounds/incisions during mobilization  - Obtain PT/OT consults as needed  - Advance activity as appropriate  - Communicate ordered activity level and limitations with patient/family  8/23/2019 0542 by Zhang Salazar, RN  Outcome: anxiety  - Utilize distraction and/or relaxation techniques  - Monitor for opioid side effects  - Notify MD/LIP if interventions unsuccessful or patient reports new pain  - Anticipate increased pain with activity and pre-medicate as appropriate  8/23/2019 precautions in place, frequent rounding done by RN and PCT.

## 2019-08-24 NOTE — PLAN OF CARE
Problem: Patient Centered Care  Goal: Patient preferences are identified and integrated in the patient's plan of care  Description  Interventions:  - What would you like us to know as we care for you?  \"I live at home with my sister\"  - Provide timely, ADULT  Goal: Electrolytes maintained within normal limits  Description  INTERVENTIONS:  - Monitor labs and rhythm and assess patient for signs and symptoms of electrolyte imbalances  - Administer electrolyte replacement as ordered  - Monitor response to el function  - Promote sitting position while performing ADLs such as feeding, grooming, and bathing  - Educate and encourage patient/family in tolerated functional activity level and precautions during self-care    Outcome: Progressing     Problem: PAIN - AD bowel function  Description  INTERVENTIONS:  - Assess bowel function  - Maintain adequate hydration with IV or PO as ordered and tolerated  - Evaluate effectiveness of GI medications  - Encourage mobilization and activity  - Obtain nutritional consult as n

## 2019-08-24 NOTE — PROGRESS NOTES
[facility]    Progress Note    Joshua Carter Patient Status:  Inpatient    1950 MRN [id number]   Location [facility] 5SW/SE Attending Boris Krishna MD   [facility] Day # 4 PCP Juliano Montague MD        Subjective:   Lev Isidro UNIT/ML injection 5,000 Units 5,000 Units Subcutaneous 2 times per day   acetaminophen (TYLENOL) tab 650 mg 650 mg Oral Q6H PRN   ondansetron HCl (ZOFRAN) injection 4 mg 4 mg Intravenous Q6H PRN   CefTRIAXone Sodium (ROCEPHIN) 1 g in sodium chloride 0.9% 1 Lab Results   Component Value Date    WBC 8.8 08/22/2019    HGB 9.5 (L) 08/22/2019    HCT 29.0 (L) 08/22/2019    .0 (L) 08/22/2019    CREATSERUM 0.70 08/22/2019    BUN 14 08/22/2019     (L) 08/22/2019    K 5.0 08/23/2019    CL 99 08/22/201

## 2019-08-24 NOTE — PLAN OF CARE
Problem: Patient Centered Care  Goal: Patient preferences are identified and integrated in the patient's plan of care  Description  Interventions:  - What would you like us to know as we care for you?  \"I live at home with my sister\"  - Provide timely, ADULT  Goal: Electrolytes maintained within normal limits  Description  INTERVENTIONS:  - Monitor labs and rhythm and assess patient for signs and symptoms of electrolyte imbalances  - Administer electrolyte replacement as ordered  - Monitor response to el ability and encourage patient to participate in ADLs to maximize function  - Promote sitting position while performing ADLs such as feeding, grooming, and bathing  - Educate and encourage patient/family in tolerated functional activity level and precaution strengthening/mobility  - Encourage toileting schedule  Outcome: Progressing     Problem: GASTROINTESTINAL - ADULT  Goal: Maintains or returns to baseline bowel function  Description  INTERVENTIONS:  - Assess bowel function  - Maintain adequate hydration w

## 2019-08-24 NOTE — PROGRESS NOTES
[facility] Hospitalist Progress Note     PCP: Katrin Pickering MD    CC: Follow up       Assessment/Plan:   a 80year old female with PMH sig PEREZ cirrhosis with EV, ascites, recurrent UTI, spinal stenosis, insomnia, anemia, who presents following a fall with °C)] 97.9 °F (36.6 °C)  Pulse:  [72-81] 72  Resp:  [16-18] 18  BP: (122-138)/(51-78) 122/60    Intake/Output:    Intake/Output Summary (Last 24 hours) at 8/24/2019 1028  Last data filed at 8/24/2019 0715  Gross per 24 hour   Intake 330 ml   Output 1850 ml * 105* 94  --        Recent Labs   Lab 08/21/19  0700 08/22/19  0710   ALT 23 26   AST 36 41*   ALB 2.4* 2.8*       No results for input(s): PGLU in the last 168 hours. No results for input(s): TROP in the last 168 hours.     Imaging:Xr Thoracic moderate-to-severe left and moderate right foraminal narrowing due to facet hypertrophy. 3.  Chronic moderate L3 compression fracture status post vertebral augmentation. 4.  Please see the body of the report for further level by level detail.  5.  Mild to

## 2019-08-25 NOTE — PROGRESS NOTES
[facility] Note: Route Optimization for Pantoprazole (PROTONIX)    Patient is currently on Pantoprazole (PROTONIX) 40 mg IV every 24 hours.    The patient meets the criteria to convert to the oral equivalent as established by the IV to Oral conversion pro

## 2019-08-25 NOTE — CM/SW NOTE
Clinical updates sent via allscripts to the preferred [facility], [facility]. Last PT 8/22 OT 8/21.  SW spoke with Rehab, OT to see later today and PT will put on list for tomorrow am.    [facility] [phone]    SHIRLEY Chase, LSW xt10

## 2019-08-25 NOTE — OCCUPATIONAL THERAPY NOTE
OCCUPATIONAL THERAPY TREATMENT NOTE - INPATIENT        Room Number: 537/537-A           Presenting Problem: kyphoplasty    Problem List  Principal Problem:    Urinary tract infection without hematuria, site unspecified  Active Problems:    Urinary tract in LIVING ASSESSMENT  AM-PAC ‘6-Clicks’ Inpatient Daily Activity Short Form  How much help from another person does the patient currently need…  -   Putting on and taking off regular lower body clothing?: A Lot  -   Bathing (including washing, rinsing, drying

## 2019-08-25 NOTE — PROGRESS NOTES
[facility] [place] [facility]    Progress Note    Jalil Galaviz Patient Status:  Inpatient    1950 MRN [id number]   Location [facility] 5SW/SE Attending Adarsh Moura MD   [facility] Day # 5 PCP Maria Guadalupe Whiteside MD        Subjective:   Rolando Barakat 3 mL 3 mL Intravenous PRN   Heparin Sodium (Porcine) 5000 UNIT/ML injection 5,000 Units 5,000 Units Subcutaneous 2 times per day   acetaminophen (TYLENOL) tab 650 mg 650 mg Oral Q6H PRN   ondansetron HCl (ZOFRAN) injection 4 mg 4 mg Intravenous Q6H PRN   [ Results:     Lab Results   Component Value Date    WBC 8.8 08/22/2019    HGB 9.5 (L) 08/22/2019    HCT 29.0 (L) 08/22/2019    .0 (L) 08/22/2019    CREATSERUM 0.70 08/22/2019    BUN 14 08/22/2019     (L) 08/22/2019    K 5.0 08/23/2019    CL 9

## 2019-08-25 NOTE — PROGRESS NOTES
[facility] Hospitalist Progress Note     PCP: Juliano Montague MD    CC: Follow up       Assessment/Plan:   a 80year old female with PMH sig PEREZ cirrhosis with EV, ascites, recurrent UTI, spinal stenosis, insomnia, anemia, who presents following a fall with data filed at 8/25/2019 1058  Gross per 24 hour   Intake 50 ml   Output 900 ml   Net -850 ml       Last 3 Weights  08/20/19 1932 : 169 lb 4.8 oz (76.8 kg)  08/20/19 1413 : 169 lb 1.5 oz (76.7 kg)  08/16/19 1203 : 169 lb (76.7 kg)  08/15/19 1141 : 169 lb (7 hours. No results for input(s): TROP in the last 168 hours. Imaging:Xr Thoracic Spine (3 Views) (cpt=72072)    Result Date: 8/20/2019  CONCLUSION:  1.  Mild L1 superior endplate vertebral compression fracture of uncertain age-probably subacute or  see the body of the report for further level by level detail. 5.  Mild to moderate free intra-abdominal fluid may be related to patient's known history of cirrhosis. 6.  Suggestion of a 1.5 x 1.1 cm hemorrhagic cyst left kidney.  This would be better charac

## 2019-08-26 NOTE — DISCHARGE SUMMARY
General Medicine Discharge Summary     Patient ID:  Owen Correa  80year old  12/12/1950    Admit date: 8/20/2019    Discharge date and time: 8/26/19    Attending Physician: Zak Lua MD     Consults: IP CONSULT TO HOSPITALIST  IP CONSULT TO SOCIAL W Commonly known as:  COLACE  Take 1 capsule (100 mg total) by mouth 2 (two) times daily.         CHANGE how you take these medications    * lactulose 10 GM/15ML Soln  Commonly known as:  CHRONULAC  TAKE 30 ML BY MOUTH 2 TIMES DAILY AS NEEDED TO HAVE 2-3 AMANDA traZODone HCl 50 MG Tabs  Commonly known as:  DESYREL  TAKE 1 TABLET BY MOUTH NIGHTLY         * This list has 2 medication(s) that are the same as other medications prescribed for you.  Read the directions carefully, and ask your doctor or other care provid

## 2019-08-26 NOTE — PLAN OF CARE
Problem: Patient Centered Care  Goal: Patient preferences are identified and integrated in the patient's plan of care  Description  Interventions:  - What would you like us to know as we care for you?  \"I live at home with my sister\"  - Provide timely, ADULT  Goal: Electrolytes maintained within normal limits  Description  INTERVENTIONS:  - Monitor labs and rhythm and assess patient for signs and symptoms of electrolyte imbalances  - Administer electrolyte replacement as ordered  - Monitor response to el function  - Promote sitting position while performing ADLs such as feeding, grooming, and bathing  - Educate and encourage patient/family in tolerated functional activity level and precautions during self-care    Outcome: Progressing     Problem: RISK FOR

## 2019-08-26 NOTE — PROGRESS NOTES
[facility]    Progress Note    Lachojennifer Cevallos Patient Status:  Inpatient    1950 MRN [id number]   Location [facility] 5SW/SE Attending Sree Schwab MD   [facility] Day # 6 PCP Neto Eason MD        Subjective:   Silas Campo Transdermal Q24H   Montelukast Sodium (SINGULAIR) tab 10 mg 10 mg Oral Nightly   Normal Saline Flush 0.9 % injection 3 mL 3 mL Intravenous PRN   Heparin Sodium (Porcine) 5000 UNIT/ML injection 5,000 Units 5,000 Units Subcutaneous 2 times per day   acetamin thereafter by PRN suppositories. Pt states abdomen feels similar to yesterday and she had BMs yesterday as well as continues to pass flatus today and has ambulated today.     Armando Rodríguez PA-C  08/26/19  2:26 PM         Results:     Lab Results   Compon

## 2019-08-26 NOTE — CM/SW NOTE
Discharge order received. Confirmed with [facility] that bed is available today and ins auth still effective.  Pt notified for plan to discharge today, she is agreeable and states her daughter will transport her at 3pm. RN notified to call report 6

## 2019-08-26 NOTE — PHYSICAL THERAPY NOTE
PHYSICAL THERAPY TREATMENT NOTE - INPATIENT     Room Number: 537/537-A       Presenting Problem: UTI; intractable back pain;  2 falls at home; hx chronic L3 (s/p kyphoplasty) & T7 compression fx; mild L1 subacute or chronic compression fx; hx of non-alcoho procedure, but now the pain is back. OBJECTIVE  Precautions: Spine    WEIGHT BEARING RESTRICTION  Weight Bearing Restriction: None                PAIN ASSESSMENT   Ratin  Location: back  Management Techniques: Activity promotion; Body mechanics; Repo Goal #2 Patient is able to demonstrate transfers Sit to/from Stand at assistance level: modified independent with walker - rolling      Goal #2  Current Status CGA   Goal #3 Patient is able to ambulate 300 feet with assist device: walker - rolling at ass

## 2019-09-13 PROBLEM — S22.050A COMPRESSION FRACTURE OF T6 VERTEBRA, INITIAL ENCOUNTER (HCC): Status: ACTIVE | Noted: 2019-01-01

## 2019-11-21 NOTE — ED INITIAL ASSESSMENT (HPI)
Pt states lower back pain that began yesterday with difficulty standing due to the pain. Pt states that the pain does not radiate down her legs. pt denies any issues with b/b

## 2019-11-21 NOTE — ED PROVIDER NOTES
Patient Seen in: [facility] Emergency Department    History   Patient presents with:  Back Pain (musculoskeletal)    Stated Complaint: back pain    HPI    Gray Caban is a 80year old female who presents with chief complaint of midline low back p (10 mg total) by mouth 3 (three) times daily as needed. gabapentin (NEURONTIN) 100 MG Oral Cap,  Take 1 capsule (100 mg total) by mouth 3 (three) times daily. traMADol HCl 50 MG Oral Tab,  Take 50 mg by mouth every 6 (six) hours as needed for Pain.    g systems reviewed and negative except as noted above. PSFH elements reviewed from today and agreed except as otherwise stated in HPI.     Physical Exam     ED Triage Vitals [11/21/19 1300]   /69   Pulse 75   Resp 16   Temp 98.2 °F (36.8 °C)   Temp s exhibits normal speech. Skin: Skin is normal to inspection. Warm and dry. No obvious bruising. No obvious rash.       ED Course     Labs Reviewed   URINALYSIS WITH CULTURE REFLEX - Abnormal; Notable for the following components:       Result Value    Ur schedule follow up care with a primary care provider within the next three months to obtain basic health screening including reassessment of your blood pressure.     Medications Prescribed:  Current Discharge Medication List    START taking these medication

## 2019-11-26 PROBLEM — M54.16 LUMBAR RADICULOPATHY: Status: ACTIVE | Noted: 2019-01-01

## 2019-11-26 PROBLEM — E87.6 HYPOKALEMIA: Status: ACTIVE | Noted: 2019-01-01

## 2019-11-26 NOTE — H&P
[facility] Hospitalist Team  History and Physical  Admit Date:  11/26/19    ASSESSMENT / PLAN:   81 yo female with PEREZ Cirrhosis with EV and ascites, recurrent UTI, insomnia, anemia, back, insomnia, back pain with previous compression fx with Kypoplasty who pres Sister at bedside was able to provide additional information. Patient apparently pulled a blanket or sheet off of her bed  within the last week and as a result of this has developed back pain.   She was seen in the ER on 11/21 with possible L2 compression Performed by Fly Wright MD at [facility] OR   • HYSTERECTOMY     • SMALL BOWEL OBSTRUCTION RELEASE N/A 9/19/2018    Performed by Fly Wright MD at [facility] OR        ALL:    Advil [Ibuprofen]       BLEEDING  Ambien [Zolpidem]       [facility] peripheral edema  Abd: Abdomen soft, nontender, nondistended, no organomegaly, bowel sounds present  MSK: Full range of motion in extremities, no clubbing, no cyanosis  Skin: no rashes or lesions  Neuro: AO*3, motor intact, no sensory deficits  Psyc: appro

## 2019-11-26 NOTE — ED PROVIDER NOTES
Patient Seen in: [facility] Emergency Department    History   Patient presents with:  Back Pain (musculoskeletal)    Stated Complaint: back injury, seen here thurs    HPI    Chief complaint: Back pain    History of present illness:   The patient comp times daily as needed. gabapentin (NEURONTIN) 100 MG Oral Cap,  Take 1 capsule (100 mg total) by mouth 3 (three) times daily.    traMADol HCl 50 MG Oral Tab,  Take 50 mg by mouth every 6 (six) hours as needed for Pain.   gabapentin 100 MG Oral Cap,  Take negative except as noted above. PSFH elements reviewed from today and agreed except as otherwise stated in HPI.     Physical Exam     ED Triage Vitals [11/26/19 1143]   /70   Pulse 81   Resp 16   Temp 98 °F (36.7 °C)   Temp src Oral   SpO2 100 % Abnormality         Status                     ---------                               -----------         ------                     CBC W/ DIFFERENTIAL[[id number]]          Abnormal            Final result                 Please view results for these essie

## 2019-11-26 NOTE — ED NOTES
Orders for admission, patient is aware of plan and ready to go upstairs. Any questions, please call ED RN [id number] [facility]  at extension [phone].

## 2019-11-26 NOTE — ED INITIAL ASSESSMENT (HPI)
Pt presents with c/o intractable back pain after being seen here for the injury Thursday of last week. Pt states \" I think they should have admitted me because I can't walk\".  Pt denies fevers, urinary symptoms or bowel or bladder incontinence

## 2019-11-26 NOTE — PROGRESS NOTES
[place] [facility] - [facility]    Report of Consultation    Dg Fong Patient Status:  Outpatient    1950 MRN [id number]   Location [place] [address] Attending Berry Menendez, [address] [facility] Day # 0 PCP Madina Sibley MD ca   • Breast Cancer Sister 62        dx at age 63   • Breast Cancer Sister 71        dx at age 71      reports that she quit smoking about 69 years ago. She has a 20.00 pack-year smoking history.  She has never used smokeless tobacco. She reports that she without hematuria     Urinary tract infection without hematuria, site unspecified     Intractable back pain     Compression fracture of T6 vertebra, initial encounter ([facility])     Hypokalemia     Lumbar radiculopathy    Assessment/Plan:  69yo with hx of L1, L

## 2019-11-27 NOTE — BRIEF PROCEDURE NOTE
[facility]OSP - [facility]  Procedure Note    Angelika Lamp Patient Status:  Observation    1950 MRN [id number]   Location [facility] Attending Jessa Pro, [address] [facility] Day # 0 PCP Christina Urbano MD     Procedure

## 2019-11-27 NOTE — PLAN OF CARE
Problem: PAIN - ADULT  Goal: Verbalizes/displays adequate comfort level or patient's stated pain goal  Description  INTERVENTIONS:  - Encourage pt to monitor pain and request assistance  - Assess pain using appropriate pain scale  - Administer analgesics weights   Outcome: Progressing  Note:   Hx. of cirrhosis, abdomen distended, soft     Problem: METABOLIC/FLUID AND ELECTROLYTES - ADULT  Goal: Electrolytes maintained within normal limits  Description  INTERVENTIONS:  - Monitor labs and rhythm and assess p

## 2019-11-27 NOTE — OCCUPATIONAL THERAPY NOTE
Order received and patient chart reviewed. Per chart and RN, patient having kyphoplasty this AM. Will hold therapy until after procedure and will need updated therapy orders.     Sung Huang, OTR/L  [address] [facility]  [id number]

## 2019-11-27 NOTE — PLAN OF CARE
Problem: Patient Centered Care  Goal: Patient preferences are identified and integrated in the patient's plan of care  Description  Interventions:  - What would you like us to know as we care for you?  \"i'm having a lot of back pain\"  - Provide timely,

## 2019-11-27 NOTE — PHYSICAL THERAPY NOTE
Order received and patient chart reviewed. Per chart and RN, patient having kyphoplasty this AM. Pt currently limited by pain requesting rest prior to procedure. Will hold therapy until after procedure and will need updated therapy orders.

## 2019-11-27 NOTE — PROGRESS NOTES
[facility] HOSP - [facility]  Report of Consultation    Teresita Hilli Patient Status:  Observation    1950 MRN [id number]   Location [facility]5W Attending Zara Ferrell MD   Hosp Day # 0 PCP Leilani Arenas MD     Date of Admission:   Sister 62        dx at age 63   • Breast Cancer Sister 71        dx at age 72      reports that she quit smoking about 69 years ago. She has a 20.00 pack-year smoking history.  She has never used smokeless tobacco. She reports that she does not drink alcoho Intravenous, Q2H PRN **OR** morphINE sulfate (PF) 4 MG/ML injection 4 mg, 4 mg, Intravenous, Q2H PRN  •  lactulose (CHRONULAC) 10 GM/15ML solution 20 g, 20 g, Oral, BID    Review of Systems:  Pertinent items are noted in HPI.     Physical Exam:  Blood press Cystitis     Compression fracture of T7 vertebra (HCC)     Skin lesion of cheek     Lower urinary tract symptoms (LUTS)     Urinary tract infection without hematuria     Urinary tract infection without hematuria, site unspecified     Intractable back pain

## 2019-11-27 NOTE — PLAN OF CARE
Patient received in bed from emergency room. Alert and oriented x 4. Patient admitted into hospital for back pain. Patient made comfortable in the bed. Vital signs taken and stable. Patient oriented to room and call light.  Fall risk precautions are in plac

## 2019-11-27 NOTE — IVS NOTE
Post procedure hand off report given to [facility]. Procedure site remains dry and intact with no signs and symptoms of bleeding or hematoma. Dr. Analilia Rodrigues spoke with patient/family post procedure. Vital signs remain stable. Bed rest maintained.

## 2019-11-27 NOTE — PLAN OF CARE
Patient: Ayanna Dooley  MRN: [id number]  : 1950  Allergies:   Advil [Ibuprofen]       BLEEDING  Ambien [Zolpidem]       CONFUSION      IR MD/ APN Pre-Procedure Plan  (Inpatients Only)    Date of IR Procedure: 2019  Procedure to be performed

## 2019-11-27 NOTE — PROGRESS NOTES
[facility] Hospitalist Progress Note     CC: Hospital Follow up    PCP: Christina Urbano MD       Assessment/Plan:     Principal Problem:    Lumbar radiculopathy  Active Problems:    Hypokalemia    79 yo female with PEREZ Cirrhosis with EV and ascites, recurrent 1775 ml       Last 3 Weights  11/27/19 0520 : 147 lb (66.7 kg)  11/26/19 1507 : 141 lb 8 oz (64.2 kg)  11/26/19 1143 : 145 lb (65.8 kg)  11/26/19 1444 : 143 lb 4.8 oz (65 kg)  11/21/19 1300 : 145 lb (65.8 kg)      Exam   GEN: NAD  HEENT: EOMI, PERRLA  Neck 2 spray Each Nare Daily   • gabapentin  100 mg Oral TID   • lidocaine-menthol  2 patch Transdermal Daily   • Montelukast Sodium  10 mg Oral Nightly   • Pantoprazole Sodium  40 mg Oral QAM AC   • spironolactone  50 mg Oral Daily   • furosemide  40 mg Oral D

## 2019-11-28 NOTE — PHYSICAL THERAPY NOTE
PHYSICAL THERAPY EVALUATION - INPATIENT     Room Number: 859/780-V  Evaluation Date: 11/28/2019  Type of Evaluation:initial  Physician Order: PT Eval and Treat    Presenting Problem: Pt admitted for observation w/ acute on chronic back pain s/p T12 and L2 of impairment may benefit from SAULO.     Pt reported plan is \" Definitely home\", lived w/ sister who suffered from multiple medical conditions and using ad for mobility, family lived in a house w/ 3 steps to enter, Pt was fully ind wo ad w/ ambulation, ADL SURGERY  18/53/7457    umbilical hernia   • HERNIA UMBILICAL REPAIR ADULT N/A 9/19/2018    Performed by Damari Menjivar MD at [address] [place] [address]    • HYSTERECTOMY     • [address] N/A 9/19/2018    Performed by Damari Menjivar MD at [address] [place] [address]  40.78   CMS Modifier (G-Code): CK    FUNCTIONAL ABILITY STATUS  Gait Assessment   Gait Assistance: Contact guard assist  Distance (ft): 15 x2  Assistive Device: Rolling walker  Pattern: (antalgic, wide YESIKA, unsteady in step to pattern.)  Stoop/Curb Assista

## 2019-11-28 NOTE — PLAN OF CARE
Problem: SAFETY ADULT - FALL  Goal: Free from fall injury  Description  INTERVENTIONS:  - Assess pt frequently for physical needs  - Identify cognitive and physical deficits and behaviors that affect risk of falls.   - [place] fall precautions as indica

## 2019-11-28 NOTE — PLAN OF CARE
Problem: Patient Centered Care  Goal: Patient preferences are identified and integrated in the patient's plan of care  Description  Interventions:  - What would you like us to know as we care for you?  \"i'm having a lot of back pain\"  - Provide timely, injury  Description  INTERVENTIONS:  - Assess pt frequently for physical needs  - Identify cognitive and physical deficits and behaviors that affect risk of falls.   - [place] fall precautions as indicated by assessment.  - Educate pt/family on patient sa including rhythm and repeat lab results as appropriate     Outcome: Progressing     Problem: SKIN/TISSUE INTEGRITY - ADULT  Goal: Incision(s), wounds(s) or drain site(s) healing without S/S of infection  Description  INTERVENTIONS:  - Assess and document r

## 2019-11-28 NOTE — CHRONIC PAIN
[facility] HOSP - [facility]  Report of Consultation    Sally Powell Patient Status:  Observation    1950 MRN [id number]   Location [facility]5W Attending Jasmina Gasca MD   Hosp Day # 0 PCP Finesse Osuna MD     Date of Admission:   Cancer Sister 62        dx at age 63   • Breast Cancer Sister 71        dx at age 71      reports that she quit smoking about 69 years ago. She has a 20.00 pack-year smoking history.  She has never used smokeless tobacco. She reports that she does not drink Intravenous, Q2H PRN **OR** morphINE sulfate (PF) 4 MG/ML injection 4 mg, 4 mg, Intravenous, Q2H PRN  •  lactulose (CHRONULAC) 10 GM/15ML solution 20 g, 20 g, Oral, BID    Review of Systems:    Physical Exam:  Blood pressure 122/79, pulse 74, temperature 9 measures were discussed at length including pharmacotherapy (eg. Anti- inflammatories, muscle relaxants, neuropathic medications, oral steroids, analgesics), injections, and further testing.  Risks and benefits of all options were discussed at length to pat

## 2019-11-29 NOTE — PHYSICAL THERAPY NOTE
PHYSICAL THERAPY TREATMENT NOTE - INPATIENT     Room Number: 835/522-T       Presenting Problem: Pt admitted for observation w/ acute on chronic back pain s/p T12 and L2 IR kyphoplasty, POD #1    Problem List  Principal Problem:    Lumbar radiculopathy  Ac side of the bed?: None   How much help from another person does the patient currently need. ..   -   Moving to and from a bed to a chair (including a wheelchair)?: None   -   Need to walk in hospital room?: None   -   Climbing 3-5 steps with a railing?: Non

## 2019-11-29 NOTE — DIETARY NOTE
ADULT NUTRITION INITIAL ASSESSMENT    Pt is at high nutrition risk. Pt meets severe malnutrition criteria.       CRITERIA FOR MALNUTRITION DIAGNOSIS:  Criteria for severe malnutrition diagnosis: acute illness/injury related to wt loss greater than 7.5% in (152 lb 6.4 oz)   BMI: Body mass index is 23.87 kg/m².   BMI CLASSIFICATION: 19-24.9 kg/m2 - WNL  IBW: 135 lbs        113% IBW  Usual Body Wt: 147 lbs       103% UBW    WEIGHT HISTORY:  Patient Weight(s) for the past 336 hrs:   Weight   11/29/19 0524 69.1 k Fat/Muscle Mass: moderate depletion muscle mass temple region per visual exam.  - Fluid Accumulation: none per RN documentation  - Skin Integrity: intact and RN documentation reviewed.   - Allen score 20    NUTRITION PRESCRIPTION:  Diet: Regular/General  O

## 2019-11-29 NOTE — PLAN OF CARE
Problem: Patient Centered Care  Goal: Patient preferences are identified and integrated in the patient's plan of care  Description  Interventions:  - What would you like us to know as we care for you?  \"i'm having a lot of back pain\"  - Provide timely, injury  Description  INTERVENTIONS:  - Assess pt frequently for physical needs  - Identify cognitive and physical deficits and behaviors that affect risk of falls.   - [place] fall precautions as indicated by assessment.  - Educate pt/family on patient sa rhythm and repeat lab results as appropriate     Outcome: Progressing     Problem: SKIN/TISSUE INTEGRITY - ADULT  Goal: Incision(s), wounds(s) or drain site(s) healing without S/S of infection  Description  INTERVENTIONS:  - Assess and document skin integr

## 2019-11-29 NOTE — CM/SW NOTE
Received MDO HH orders. F2F completed. Instructions for [facility] added to pt's AVS.    PLAN: [facility] orders in, F2F completed    SW/CM to remain available for support and/or discharge planning.        Doni Nunn, [address] [facility]

## 2019-11-29 NOTE — HOME CARE LIAISON
Received referral from NAOMY/Emily. Met with patient at the bedside. Patient is agreeable to [facility], pending orders. Residential brochure provided with contact information. All questions addressed and answered.      Patient will need a

## 2019-11-29 NOTE — PLAN OF CARE
Problem: PAIN - ADULT  Goal: Verbalizes/displays adequate comfort level or patient's stated pain goal  Description  INTERVENTIONS:  - Encourage pt to monitor pain and request assistance  - Assess pain using appropriate pain scale  - Administer analgesics temperature  - Evaluate fluid balance, assess for edema, trend weights   Outcome: Progressing     Problem: METABOLIC/FLUID AND ELECTROLYTES - ADULT  Goal: Electrolytes maintained within normal limits  Description  INTERVENTIONS:  - Monitor labs and rhythm

## 2019-11-29 NOTE — PROGRESS NOTES
[facility] Hospitalist Progress Note     CC: Hospital Follow up    PCP: Lili Flor MD       Assessment/Plan:     Principal Problem:    Lumbar radiculopathy  Active Problems:    Hypokalemia    79 yo female with PEREZ Cirrhosis with EV and ascites, recurrent 122/64      Intake/Output:    Intake/Output Summary (Last 24 hours) at 11/29/2019 1411  Last data filed at 11/29/2019 1255  Gross per 24 hour   Intake 875 ml   Output 950 ml   Net -75 ml       Last 3 Weights  11/29/19 0524 : 152 lb 6.4 oz (69.1 kg)  11/27/ mild-moderate L1 and moderate L3 vertebral compression fractures with post kyphoplasty changes. 3. L5-S1:  Grade 1 spondylolisthesis related to bilateral L5 pars defects. Advanced disc degeneration. Severe bilateral foraminal narrowing. No change.  4. Ab

## 2019-11-29 NOTE — PROGRESS NOTES
[facility] Hospitalist Progress Note     CC: Hospital Follow up    PCP: Meredith Reid MD       Assessment/Plan:     Principal Problem:    Lumbar radiculopathy  Active Problems:    Hypokalemia    79 yo female with PEREZ Cirrhosis with EV and ascites, recurrent data filed at 11/28/2019 1710  Gross per 24 hour   Intake 1375 ml   Output 1475 ml   Net -100 ml       Last 3 Weights  11/27/19 0520 : 147 lb (66.7 kg)  11/26/19 1507 : 141 lb 8 oz (64.2 kg)  11/26/19 1143 : 145 lb (65.8 kg)  11/26/19 1444 : 143 lb 4.8 oz Abdominal/pelvic ascites seen on  and at the edge of the field of view. . 5. Sigmoid colon diverticulosis. .    Dictated by (CST): Yenni Leiva MD on 11/26/2019 at 21:00     Approved by (CST): Yenni Leiva MD on 11/26/2019 at 21:09              Meds

## 2019-11-29 NOTE — CM/SW NOTE
MDO for [facility] services. Referral to [facility], spoke with Rukhsana liaison to evaluate. CM/SW to remain available for support and/or discharge planning.     Na Lewis RN, [facility]    Ext. [phone]

## 2019-11-29 NOTE — CHRONIC PAIN
[facility]  Anesthesiology Pain Management Progress Note      Patient name: Sandy You 80year old female  : 1950  MRN: [id number]    Diagnosis:  Lumbar radiculopathy  (primary encounter diagnosis)    Reason for Consult: low ba MG/5ML suspension 30 mL, 30 mL, Oral, Daily PRN  •  bisacodyl (DULCOLAX) rectal suppository 10 mg, 10 mg, Rectal, Daily PRN  •  FLEET ENEMA (FLEET) 7-19 GM/118ML enema 133 mL, 1 enema, Rectal, Once PRN  •  morphINE sulfate (PF) 2 MG/ML injection 1 mg, 1 mg Date    WBC 5.7 11/29/2019    HGB 9.6 (L) 11/29/2019    HCT 29.2 (L) 11/29/2019    .0 (L) 11/29/2019    CREATSERUM 0.60 11/29/2019    BUN 8 11/29/2019     11/29/2019    K 3.7 11/29/2019     11/29/2019    CO2 26.0 11/29/2019     (H

## 2019-11-29 NOTE — PROGRESS NOTES
11/28/19 1839   Clinical Encounter Type   Visited With Patient   Routine Visit Introduction  (consult)   Continue Visiting Yes   Patient's Supportive Strategies/Resources family   Patient Spiritual Encounters   Spiritual Assessment Completed Yes  (Pt sa

## 2019-11-29 NOTE — OCCUPATIONAL THERAPY NOTE
OCCUPATIONAL THERAPY EVALUATION - INPATIENT     Room Number: 081/469-X  Evaluation Date: 11/29/2019  Type of Evaluation: Initial  Presenting Problem: (s/p T12 & L2 kyphoplasty)    Physician Order: IP Consult to Occupational Therapy  Reason for Therapy: ADL technique education       OCCUPATIONAL THERAPY MEDICAL/SOCIAL HISTORY     Problem List  Principal Problem:    Lumbar radiculopathy  Active Problems:    Hypokalemia      Past Medical History  Past Medical History:   Diagnosis Date   • Allergic rhinitis    • medication at this time )      COGNITION  Overall Cognitive Status:  WFL - within functional limits    VISION  Current Vision: wears glasses all the time  Acuity:  able to read clock/calendar on wall without difficulty      SENSATION  Reports no BUE numbne functional transfer with modified independent. Comment:     Patient will complete toileting with modified independent. Comment:     Patient will tolerate standing for 5 minutes in prep for adls with modified independent.     Comment:    Patient will com

## 2019-11-30 NOTE — PROGRESS NOTES
[facility] Hospitalist Progress Note     CC: Hospital Follow up    PCP: Meagan Hurst MD       Assessment/Plan:     Principal Problem:    Lumbar radiculopathy  Active Problems:    Hypokalemia    81 yo female with PEREZ Cirrhosis with EV and ascites, recurrent Last 3 Weights  11/30/19 0412 : 151 lb (68.5 kg)  11/29/19 0524 : 152 lb 6.4 oz (69.1 kg)  11/27/19 0520 : 147 lb (66.7 kg)  11/26/19 1507 : 141 lb 8 oz (64.2 kg)  11/26/19 1143 : 145 lb (65.8 kg)  11/26/19 1444 : 143 lb 4.8 oz (65 kg)  11/21/19 1300 sulfate  325 mg Oral Daily with breakfast   • Fluticasone Propionate  2 spray Each Nare Daily   • gabapentin  100 mg Oral TID   • lidocaine-menthol  2 patch Transdermal Daily   • Montelukast Sodium  10 mg Oral Nightly   • Pantoprazole Sodium  40 mg Oral QA

## 2019-11-30 NOTE — PLAN OF CARE
Problem: PAIN - ADULT  Goal: Verbalizes/displays adequate comfort level or patient's stated pain goal  Description  INTERVENTIONS:  - Encourage pt to monitor pain and request assistance  - Assess pain using appropriate pain scale  - Administer analgesics gas.     Problem: SKIN/TISSUE INTEGRITY - ADULT  Goal: Incision(s), wounds(s) or drain site(s) healing without S/S of infection  Description  INTERVENTIONS:  - Assess and document skin integrity  - Assess and document dressing/incision, wound bed, drain si

## 2019-11-30 NOTE — PLAN OF CARE
Problem: Patient Centered Care  Goal: Patient preferences are identified and integrated in the patient's plan of care  Description  Interventions:  - What would you like us to know as we care for you?  \"i'm having a lot of back pain\"  - Provide timely, injury  Description  INTERVENTIONS:  - Assess pt frequently for physical needs  - Identify cognitive and physical deficits and behaviors that affect risk of falls.   - [place] fall precautions as indicated by assessment.  - Educate pt/family on patient sa rhythm and repeat lab results as appropriate     Outcome: Not Progressing     Problem: SKIN/TISSUE INTEGRITY - ADULT  Goal: Incision(s), wounds(s) or drain site(s) healing without S/S of infection  Description  INTERVENTIONS:  - Assess and document skin in

## 2019-12-01 NOTE — PROGRESS NOTES
[facility] Hospitalist Progress Note     CC: Hospital Follow up    PCP: Bishop Dina MD       Assessment/Plan:     Principal Problem:    Lumbar radiculopathy  Active Problems:    Hypokalemia    81 yo female with PEREZ Cirrhosis with EV and ascites, recurrent 3840 : 151 lb (68.5 kg)  11/29/19 0524 : 152 lb 6.4 oz (69.1 kg)  11/27/19 0520 : 147 lb (66.7 kg)  11/26/19 1507 : 141 lb 8 oz (64.2 kg)  11/26/19 1143 : 145 lb (65.8 kg)  11/26/19 1444 : 143 lb 4.8 oz (65 kg)  11/21/19 1300 : 145 lb (65.8 kg)      Exam 11/30/2019 at 11:05              Meds:     • furosemide  40 mg Intravenous BID (Diuretic)   • lidocaine-menthol  2 patch Transdermal Daily   • PEG 3350  17 g Oral Daily   • lactulose  20 g Oral BID   • Heparin Sodium (Porcine)  5,000 Units Subcutaneous 2 t

## 2019-12-01 NOTE — PLAN OF CARE
Problem: Patient Centered Care  Goal: Patient preferences are identified and integrated in the patient's plan of care  Description  Interventions:  - What would you like us to know as we care for you?  \"i'm having a lot of back pain\"  - Provide timely, injury  Description  INTERVENTIONS:  - Assess pt frequently for physical needs  - Identify cognitive and physical deficits and behaviors that affect risk of falls.   - [place] fall precautions as indicated by assessment.  - Educate pt/family on patient sa rhythm and repeat lab results as appropriate     Outcome: Progressing     Problem: SKIN/TISSUE INTEGRITY - ADULT  Goal: Incision(s), wounds(s) or drain site(s) healing without S/S of infection  Description  INTERVENTIONS:  - Assess and document skin integr

## 2019-12-01 NOTE — PLAN OF CARE
Problem: Patient Centered Care  Goal: Patient preferences are identified and integrated in the patient's plan of care  Description  Interventions:  - What would you like us to know as we care for you?  \"i'm having a lot of back pain\"  - Provide timely, injury  Description  INTERVENTIONS:  - Assess pt frequently for physical needs  - Identify cognitive and physical deficits and behaviors that affect risk of falls.   - [place] fall precautions as indicated by assessment.  - Educate pt/family on patient sa rhythm and repeat lab results as appropriate     Outcome: Progressing     Problem: SKIN/TISSUE INTEGRITY - ADULT  Goal: Incision(s), wounds(s) or drain site(s) healing without S/S of infection  Description  INTERVENTIONS:  - Assess and document skin integr

## 2019-12-01 NOTE — PHYSICAL THERAPY NOTE
PHYSICAL THERAPY TREATMENT NOTE - INPATIENT     Room Number: 601/381-B       Presenting Problem: Pt admitted for observation w/ acute on chronic back pain s/p T12 and L2 IR kyphoplasty, POD #1    Problem List  Principal Problem:    Lumbar radiculopathy  Ac TOLERANCE                         O2 WALK                  AM-PAC '6-Clicks' INPATIENT SHORT FORM - BASIC MOBILITY  How much difficulty does the patient currently have. ..  -   Turning over in bed (including adjusting bedclothes, sheets and blankets)?: ARIEL Martin patient in preparation for discharge.    Goal #5   Current Status IN PROGRESS   Goal #6    Goal #6  Current Status

## 2019-12-02 NOTE — CM/SW NOTE
12/3 - 1153am:  Notified Salty Riley from [facility] of pt's discharge today. SW received MDO for cane needed at discharge.  SW spoke w/ Ling from PT, confirmed that a cane has been provided to pt.     ----------------  12/2 - 1123am:  Per RN, pt stable to discharg

## 2019-12-02 NOTE — IMAGING NOTE
1145 Pt to ultrasound room scouts taken by Juan David Alvarez RT    1150 Hx taken procedure explained questions answered     [id number] Patient consented. Pt to get albumin 25% 25 grams  rocio MESA  Here scanning completed .   PLATELETS =090  YE=83.7   INR=

## 2019-12-02 NOTE — PLAN OF CARE
Problem: Patient Centered Care  Goal: Patient preferences are identified and integrated in the patient's plan of care  Description  Interventions:  - What would you like us to know as we care for you?  \"i'm having a lot of back pain\"  - Provide timely, injury  Description  INTERVENTIONS:  - Assess pt frequently for physical needs  - Identify cognitive and physical deficits and behaviors that affect risk of falls.   - [place] fall precautions as indicated by assessment.  - Educate pt/family on patient sa rhythm and repeat lab results as appropriate     Outcome: Progressing     Problem: SKIN/TISSUE INTEGRITY - ADULT  Goal: Incision(s), wounds(s) or drain site(s) healing without S/S of infection  Description  INTERVENTIONS:  - Assess and document skin integr

## 2019-12-02 NOTE — DISCHARGE SUMMARY
[facility] Hospitalist Discharge Summary   Patient ID:  Monica Bernstein  [id number]  89 year old  12/12/1950    Admit date: 11/26/2019  Discharge date: 12/2/2019    Primary Care Physician: Laurita Ignacio MD   Attending Physician: Agata Hooper MD   Consults: Patient seen in ER she is very groggy as she has gotten 2 doses of IV morphine. Limited history from pt. Sister at bedside was able to provide additional information.   Patient apparently pulled a blanket or sheet off of her bed  within the last week and -12/2 S/P paracentesis with 1515 cc removed. Cell count not suggestive of SBP.  Cultures pending  -S/P IV lasix, continue home lasix and aldactone at D/C  -follow up with GI     Vitamin D Def  -vitamin d level 20  -vitamin d 2000 units added    Severe Malnu * gabapentin 300 MG Caps  Commonly known as:  NEURONTIN  What changed:  Another medication with the same name was removed. Continue taking this medication, and follow the directions you see here.      HYDROcodone-acetaminophen 5-325 MG Tabs  Commonly kno These medications were sent to [place] [address], [address] [facility] AT [address] [facility], [phone], [address], [address] [address]    Phone:  [phone]   · furosemide 40 MG Tabs

## 2019-12-02 NOTE — CDS QUERY
Potential for Impaired Nutritional Status  DOCUMENTATION CLARIFICATION FORM  Dear Doctor:  Clinical information suggests potential for impaired nutritional status.  For accurate ICD-10-CM code assignment to reflect severity of illness and risk of mortality, discussed.   - Vitamin and mineral supplements: iron  - Feeding assistance: meal set up  - Nutrition education: assess education needs     - Coordination of nutrition care: collaboration with other providers  - Discharge and transfer of nutrition care to ne

## 2019-12-02 NOTE — PLAN OF CARE
Problem: Patient Centered Care  Goal: Patient preferences are identified and integrated in the patient's plan of care  Description  Interventions:  - What would you like us to know as we care for you?  \"i'm having a lot of back pain\"  - Provide timely, injury  Description  INTERVENTIONS:  - Assess pt frequently for physical needs  - Identify cognitive and physical deficits and behaviors that affect risk of falls.   - [place] fall precautions as indicated by assessment.  - Educate pt/family on patient sa rhythm and repeat lab results as appropriate     Outcome: Progressing   VSS, had paracentesis this am with 1500cc removed. Potassium covered per protocol. Discharge cancelled, pt c/o numbness and tingling from right groin to right knee.   Ultrasound orde

## 2019-12-02 NOTE — PROGRESS NOTES
[facility] Hospitalist Team  Progress Note    Dayan Fallow Patient Status:  Inpatient    1950 MRN [id number]   Location [facility]5W Attending Heather Butler MD   [facility] Day # 1 PCP Meagan Hurst MD     CC: Follow Up  PCP: Meagan Hurst, pending paracentesis results  PCP: Mohamud Lauren MD      Concerns regarding plan of care were discussed with patient. Patient agrees with plan as detailed above.  Discussed plan of care with Dr. Kaleigh Friend RN, NP   [address] [place] [address] Q4H  gabapentin (NEURONTIN) cap 300 mg, 300 mg, Oral, Nightly  gabapentin (NEURONTIN) cap 100 mg, 100 mg, Oral, TID CC  cholecalciferol (VITAMIN D3) cap/tab 2,000 Units, 2,000 Units, Oral, Daily  lidocaine-menthol 4-1 % 2 patch, 2 patch, Transdermal, Daily wheezing  Cv: RRR, no murmur or rub  Abdomen: + Bs, soft tender in RLQ, mildly distended, dressing c/d/i, no HSM  Ext: warm to touch, no edema  Neuro: no focal deficits, some diminished sensation in right anterior thigh    Ms. Damon Ornelas is a 81 yo female with

## 2019-12-03 NOTE — PLAN OF CARE
Problem: Patient Centered Care  Goal: Patient preferences are identified and integrated in the patient's plan of care  Description  Interventions:  - What would you like us to know as we care for you?  \"i'm having a lot of back pain\"  - Provide timely, injury  Description  INTERVENTIONS:  - Assess pt frequently for physical needs  - Identify cognitive and physical deficits and behaviors that affect risk of falls.   - [place] fall precautions as indicated by assessment.  - Educate pt/family on patient sa rhythm and repeat lab results as appropriate     Outcome: Progressing     Problem: SKIN/TISSUE INTEGRITY - ADULT  Goal: Incision(s), wounds(s) or drain site(s) healing without S/S of infection  Description  INTERVENTIONS:  - Assess and document skin integr

## 2019-12-03 NOTE — PLAN OF CARE
Problem: Patient Centered Care  Goal: Patient preferences are identified and integrated in the patient's plan of care  Description  Interventions:  - What would you like us to know as we care for you?  \"i'm having a lot of back pain\"  - Provide timely, FALL  Goal: Free from fall injury  Description  INTERVENTIONS:  - Assess pt frequently for physical needs  - Identify cognitive and physical deficits and behaviors that affect risk of falls.   - [place] fall precautions as indicated by assessment.  - Educ Monitor response to electrolyte replacements, including rhythm and repeat lab results as appropriate     Outcome: Adequate for Discharge     Problem: SKIN/TISSUE INTEGRITY - ADULT  Goal: Incision(s), wounds(s) or drain site(s) healing without S/S of infect

## 2019-12-03 NOTE — DISCHARGE SUMMARY
[facility] Hospitalist Discharge Summary   Patient ID:  Esthela Amor  [id number]  72 year old  12/12/1950    Admit date: 11/26/2019  Discharge date: 12/3/2019    Primary Care Physician: Ellis Faith MD   Attending Physician: Debi Golden MD   Consults: Patient seen in ER she is very groggy as she has gotten 2 doses of IV morphine. Limited history from pt. Sister at bedside was able to provide additional information.   Patient apparently pulled a blanket or sheet off of her bed  within the last week and -12/2 S/P paracentesis with 1515 cc removed. Cell count not suggestive of SBP.  Cultures pending  -S/P IV lasix, continue home lasix and aldactone at D/C  -follow up with GI     Vitamin D Def  -vitamin d level 20  -vitamin d 2000 units added    Severe Malnu What changed:  Another medication with the same name was removed. Continue taking this medication, and follow the directions you see here.      HYDROcodone-acetaminophen 5-325 MG Tabs  Commonly known as:  NORCO  Take 1 tablet by mouth every 4 (four) hours a · furosemide 40 MG Tabs     You can get these medications from any pharmacy    Bring a paper prescription for each of these medications  · baclofen 10 MG Tabs  · HYDROcodone-acetaminophen 5-325 MG Tabs  · lidocaine 5 % Ptch  · Vitamin D 50 MCG (2000 UT) Ca Abdomen: + Bs, soft tender in RLQ, mildly distended, dressing c/d/i, no HSM  Ext: warm to touch, no edema  Neuro: no focal deficits, some diminished sensation in right anterior thigh     MsJesus Vega is a 79 yo female with PMH of PEREZ cirrhosis c/b EV and asc

## 2019-12-03 NOTE — PHYSICAL THERAPY NOTE
PHYSICAL THERAPY TREATMENT NOTE - INPATIENT     Room Number: 621/247-Q       Presenting Problem: Pt admitted for observation w/ acute on chronic back pain s/p T12 and L2 IR kyphoplasty, POD #1    Problem List  Principal Problem:    Lumbar radiculopathy  Ac A Little   -   Moving from lying on back to sitting on the side of the bed?: A Little   How much help from another person does the patient currently need. ..   -   Moving to and from a bed to a chair (including a wheelchair)?: A Little   -   Need to walk in

## 2019-12-06 PROBLEM — Z87.440: Status: ACTIVE | Noted: 2019-01-01

## 2019-12-06 PROBLEM — M48.54XA: Status: ACTIVE | Noted: 2019-01-01

## 2019-12-06 PROBLEM — M48.56XA: Status: ACTIVE | Noted: 2017-09-07

## 2020-01-01 ENCOUNTER — HOSPITAL ENCOUNTER (INPATIENT)
Facility: HOSPITAL | Age: 70
LOS: 3 days | Discharge: HOME | DRG: 441 | End: 2020-01-01
Attending: EMERGENCY MEDICINE | Admitting: INTERNAL MEDICINE
Payer: MEDICARE

## 2020-01-01 ENCOUNTER — HOSPITAL ENCOUNTER (OUTPATIENT)
Dept: INTERVENTIONAL RADIOLOGY/VASCULAR | Facility: HOSPITAL | Age: 70
Discharge: HOME | End: 2020-01-01
Attending: RADIOLOGY | Admitting: RADIOLOGY
Payer: MEDICARE

## 2020-01-01 ENCOUNTER — LAB ENCOUNTER (OUTPATIENT)
Dept: LAB | Facility: HOSPITAL | Age: 70
End: 2020-01-01
Attending: RADIOLOGY
Payer: MEDICARE

## 2020-01-01 ENCOUNTER — HOSPITAL ENCOUNTER (OUTPATIENT)
Dept: ULTRASOUND IMAGING | Facility: HOSPITAL | Age: 70
Discharge: HOME | DRG: 442 | End: 2020-01-01
Attending: INTERNAL MEDICINE
Payer: MEDICARE

## 2020-01-01 ENCOUNTER — HOSPITAL ENCOUNTER (OUTPATIENT)
Dept: INTERVENTIONAL RADIOLOGY/VASCULAR | Facility: HOSPITAL | Age: 70
Discharge: HOME | End: 2020-01-01
Attending: INTERNAL MEDICINE | Admitting: RADIOLOGY
Payer: MEDICARE

## 2020-01-01 ENCOUNTER — APPOINTMENT (OUTPATIENT)
Dept: GENERAL RADIOLOGY | Facility: HOSPITAL | Age: 70
DRG: 441 | End: 2020-01-01
Attending: EMERGENCY MEDICINE
Payer: MEDICARE

## 2020-01-01 ENCOUNTER — ORDER TRANSCRIPTION (OUTPATIENT)
Dept: ADMINISTRATIVE | Facility: HOSPITAL | Age: 70
End: 2020-01-01

## 2020-01-01 ENCOUNTER — APPOINTMENT (OUTPATIENT)
Dept: CT IMAGING | Facility: HOSPITAL | Age: 70
DRG: 442 | End: 2020-01-01
Attending: EMERGENCY MEDICINE
Payer: MEDICARE

## 2020-01-01 ENCOUNTER — APPOINTMENT (OUTPATIENT)
Dept: ULTRASOUND IMAGING | Facility: HOSPITAL | Age: 70
DRG: 441 | End: 2020-01-01
Attending: INTERNAL MEDICINE
Payer: MEDICARE

## 2020-01-01 ENCOUNTER — ANESTHESIA (OUTPATIENT)
Dept: INTERVENTIONAL RADIOLOGY/VASCULAR | Facility: HOSPITAL | Age: 70
End: 2020-01-01
Payer: MEDICARE

## 2020-01-01 ENCOUNTER — HOSPITAL ENCOUNTER (INPATIENT)
Facility: HOSPITAL | Age: 70
LOS: 4 days | Discharge: HOME | DRG: 442 | End: 2020-01-01
Attending: EMERGENCY MEDICINE | Admitting: HOSPITALIST
Payer: MEDICARE

## 2020-01-01 ENCOUNTER — ANESTHESIA (OUTPATIENT)
Dept: ENDOSCOPY | Facility: HOSPITAL | Age: 70
DRG: 442 | End: 2020-01-01
Payer: MEDICARE

## 2020-01-01 ENCOUNTER — ANESTHESIA EVENT (OUTPATIENT)
Dept: ENDOSCOPY | Facility: HOSPITAL | Age: 70
DRG: 442 | End: 2020-01-01
Payer: MEDICARE

## 2020-01-01 VITALS
SYSTOLIC BLOOD PRESSURE: 108 MMHG | DIASTOLIC BLOOD PRESSURE: 64 MMHG | WEIGHT: 118 LBS | HEART RATE: 68 BPM | BODY MASS INDEX: 18 KG/M2 | OXYGEN SATURATION: 96 % | RESPIRATION RATE: 9 BRPM

## 2020-01-01 VITALS
OXYGEN SATURATION: 94 % | HEIGHT: 67 IN | BODY MASS INDEX: 21.18 KG/M2 | HEART RATE: 73 BPM | TEMPERATURE: 98 F | WEIGHT: 134.94 LBS | SYSTOLIC BLOOD PRESSURE: 113 MMHG | DIASTOLIC BLOOD PRESSURE: 66 MMHG | RESPIRATION RATE: 15 BRPM

## 2020-01-01 VITALS
DIASTOLIC BLOOD PRESSURE: 54 MMHG | RESPIRATION RATE: 18 BRPM | HEART RATE: 81 BPM | OXYGEN SATURATION: 98 % | WEIGHT: 127 LBS | SYSTOLIC BLOOD PRESSURE: 106 MMHG | BODY MASS INDEX: 19.93 KG/M2 | TEMPERATURE: 98 F | HEIGHT: 67 IN

## 2020-01-01 VITALS — HEART RATE: 89 BPM | SYSTOLIC BLOOD PRESSURE: 102 MMHG | DIASTOLIC BLOOD PRESSURE: 50 MMHG | RESPIRATION RATE: 15 BRPM

## 2020-01-01 VITALS
SYSTOLIC BLOOD PRESSURE: 106 MMHG | DIASTOLIC BLOOD PRESSURE: 50 MMHG | RESPIRATION RATE: 16 BRPM | HEART RATE: 76 BPM | OXYGEN SATURATION: 98 % | TEMPERATURE: 98 F | HEIGHT: 67 IN | WEIGHT: 135.69 LBS | BODY MASS INDEX: 21.3 KG/M2

## 2020-01-01 DIAGNOSIS — N30.01 ACUTE CYSTITIS WITH HEMATURIA: Primary | ICD-10-CM

## 2020-01-01 DIAGNOSIS — K74.60 LIVER CIRRHOSIS SECONDARY TO NASH (HCC): ICD-10-CM

## 2020-01-01 DIAGNOSIS — K75.81 LIVER CIRRHOSIS SECONDARY TO NASH (NONALCOHOLIC STEATOHEPATITIS) (HCC): ICD-10-CM

## 2020-01-01 DIAGNOSIS — E86.0 DEHYDRATION: ICD-10-CM

## 2020-01-01 DIAGNOSIS — D64.9 ANEMIA, UNSPECIFIED TYPE: ICD-10-CM

## 2020-01-01 DIAGNOSIS — R18.8 OTHER ASCITES: ICD-10-CM

## 2020-01-01 DIAGNOSIS — R18.8 CIRRHOSIS OF LIVER WITH ASCITES, UNSPECIFIED HEPATIC CIRRHOSIS TYPE (HCC): Primary | ICD-10-CM

## 2020-01-01 DIAGNOSIS — Z01.818 PRE-OP TESTING: Primary | ICD-10-CM

## 2020-01-01 DIAGNOSIS — S22.000A COMPRESSION FRACTURE OF BODY OF THORACIC VERTEBRA (HCC): ICD-10-CM

## 2020-01-01 DIAGNOSIS — K74.60 CIRRHOSIS OF LIVER WITH ASCITES, UNSPECIFIED HEPATIC CIRRHOSIS TYPE (HCC): Primary | ICD-10-CM

## 2020-01-01 DIAGNOSIS — R41.82 ALTERED MENTAL STATUS, UNSPECIFIED ALTERED MENTAL STATUS TYPE: ICD-10-CM

## 2020-01-01 DIAGNOSIS — K75.81 LIVER CIRRHOSIS SECONDARY TO NASH (HCC): ICD-10-CM

## 2020-01-01 DIAGNOSIS — Z01.818 PRE-OP TESTING: ICD-10-CM

## 2020-01-01 DIAGNOSIS — G93.40 ENCEPHALOPATHY: ICD-10-CM

## 2020-01-01 DIAGNOSIS — K74.60 LIVER CIRRHOSIS SECONDARY TO NASH (NONALCOHOLIC STEATOHEPATITIS) (HCC): ICD-10-CM

## 2020-01-01 DIAGNOSIS — E87.6 HYPOKALEMIA: ICD-10-CM

## 2020-01-01 DIAGNOSIS — K72.90 HEPATIC ENCEPHALOPATHY (HCC): Primary | ICD-10-CM

## 2020-01-01 DIAGNOSIS — S22.000A COMPRESSION FRACTURE OF BODY OF THORACIC VERTEBRA (HCC): Primary | ICD-10-CM

## 2020-01-01 LAB
ALBUMIN FLD-MCNC: 0.3 G/DL
ALBUMIN SERPL-MCNC: 2.2 G/DL (ref 3.4–5)
ALBUMIN SERPL-MCNC: 2.2 G/DL (ref 3.4–5)
ALBUMIN SERPL-MCNC: 2.5 G/DL (ref 3.4–5)
ALBUMIN SERPL-MCNC: 2.8 G/DL (ref 3.4–5)
ALBUMIN/GLOB SERPL: 0.7 {RATIO} (ref 1–2)
ALP LIVER SERPL-CCNC: 184 U/L (ref 55–142)
ALT SERPL-CCNC: 15 U/L (ref 13–56)
AMMONIA PLAS-MCNC: 45 UMOL/L (ref 11–32)
ANION GAP SERPL CALC-SCNC: 3 MMOL/L (ref 0–18)
ANION GAP SERPL CALC-SCNC: 3 MMOL/L (ref 0–18)
ANION GAP SERPL CALC-SCNC: 5 MMOL/L (ref 0–18)
ANION GAP SERPL CALC-SCNC: 6 MMOL/L (ref 0–18)
ANION GAP SERPL CALC-SCNC: 8 MMOL/L (ref 0–18)
APTT PPP: 37.9 SECONDS (ref 23.2–35.3)
AST SERPL-CCNC: 36 U/L (ref 15–37)
BASOPHILS # BLD AUTO: 0.06 X10(3) UL (ref 0–0.2)
BASOPHILS # BLD AUTO: 0.07 X10(3) UL (ref 0–0.2)
BASOPHILS # BLD AUTO: 0.08 X10(3) UL (ref 0–0.2)
BASOPHILS # BLD AUTO: 0.09 X10(3) UL (ref 0–0.2)
BASOPHILS NFR BLD AUTO: 1.2 %
BASOPHILS NFR BLD AUTO: 1.3 %
BASOPHILS NFR BLD AUTO: 1.6 %
BASOPHILS NFR BLD AUTO: 2 %
BASOPHILS NFR FLD: 0 %
BILIRUB SERPL-MCNC: 2.5 MG/DL (ref 0.1–2)
BILIRUB UR QL: NEGATIVE
BUN BLD-MCNC: 10 MG/DL (ref 7–18)
BUN BLD-MCNC: 11 MG/DL (ref 7–18)
BUN BLD-MCNC: 13 MG/DL (ref 7–18)
BUN/CREAT SERPL: 18.2 (ref 10–20)
BUN/CREAT SERPL: 18.9 (ref 10–20)
BUN/CREAT SERPL: 18.9 (ref 10–20)
BUN/CREAT SERPL: 19.7 (ref 10–20)
BUN/CREAT SERPL: 22 (ref 10–20)
BUN/CREAT SERPL: 22.9 (ref 10–20)
BUN/CREAT SERPL: 22.9 (ref 10–20)
CALCIUM BLD-MCNC: 7.6 MG/DL (ref 8.5–10.1)
CALCIUM BLD-MCNC: 8.2 MG/DL (ref 8.5–10.1)
CALCIUM BLD-MCNC: 8.3 MG/DL (ref 8.5–10.1)
CALCIUM BLD-MCNC: 8.8 MG/DL (ref 8.5–10.1)
CHLORIDE SERPL-SCNC: 106 MMOL/L (ref 98–112)
CHLORIDE SERPL-SCNC: 106 MMOL/L (ref 98–112)
CHLORIDE SERPL-SCNC: 108 MMOL/L (ref 98–112)
CHLORIDE SERPL-SCNC: 108 MMOL/L (ref 98–112)
CHLORIDE SERPL-SCNC: 110 MMOL/L (ref 98–112)
CHLORIDE SERPL-SCNC: 110 MMOL/L (ref 98–112)
CHLORIDE SERPL-SCNC: 99 MMOL/L (ref 98–112)
CO2 SERPL-SCNC: 24 MMOL/L (ref 21–32)
CO2 SERPL-SCNC: 24 MMOL/L (ref 21–32)
CO2 SERPL-SCNC: 25 MMOL/L (ref 21–32)
CO2 SERPL-SCNC: 25 MMOL/L (ref 21–32)
CO2 SERPL-SCNC: 26 MMOL/L (ref 21–32)
COLOR FLD: YELLOW
COLOR UR: YELLOW
CREAT BLD-MCNC: 0.48 MG/DL (ref 0.55–1.02)
CREAT BLD-MCNC: 0.48 MG/DL (ref 0.55–1.02)
CREAT BLD-MCNC: 0.5 MG/DL (ref 0.55–1.02)
CREAT BLD-MCNC: 0.53 MG/DL (ref 0.55–1.02)
CREAT BLD-MCNC: 0.53 MG/DL (ref 0.55–1.02)
CREAT BLD-MCNC: 0.55 MG/DL (ref 0.55–1.02)
CREAT BLD-MCNC: 0.66 MG/DL (ref 0.55–1.02)
DEPRECATED RDW RBC AUTO: 52.4 FL (ref 35.1–46.3)
DEPRECATED RDW RBC AUTO: 53 FL (ref 35.1–46.3)
DEPRECATED RDW RBC AUTO: 53.1 FL (ref 35.1–46.3)
DEPRECATED RDW RBC AUTO: 54.7 FL (ref 35.1–46.3)
EOSINOPHIL # BLD AUTO: 0.02 X10(3) UL (ref 0–0.7)
EOSINOPHIL # BLD AUTO: 0.08 X10(3) UL (ref 0–0.7)
EOSINOPHIL # BLD AUTO: 0.23 X10(3) UL (ref 0–0.7)
EOSINOPHIL # BLD AUTO: 0.31 X10(3) UL (ref 0–0.7)
EOSINOPHIL NFR BLD AUTO: 0.3 %
EOSINOPHIL NFR BLD AUTO: 1.6 %
EOSINOPHIL NFR BLD AUTO: 5.3 %
EOSINOPHIL NFR BLD AUTO: 6.7 %
EOSINOPHIL NFR FLD: 0 %
ERYTHROCYTE [DISTWIDTH] IN BLOOD BY AUTOMATED COUNT: 14.4 % (ref 11–15)
ERYTHROCYTE [DISTWIDTH] IN BLOOD BY AUTOMATED COUNT: 14.4 % (ref 11–15)
ERYTHROCYTE [DISTWIDTH] IN BLOOD BY AUTOMATED COUNT: 14.5 % (ref 11–15)
ERYTHROCYTE [DISTWIDTH] IN BLOOD BY AUTOMATED COUNT: 14.5 % (ref 11–15)
ETHANOL SERPL-MCNC: <3 MG/DL (ref ?–3)
GLOBULIN PLAS-MCNC: 4.2 G/DL (ref 2.8–4.4)
GLUCOSE BLD-MCNC: 108 MG/DL (ref 70–99)
GLUCOSE BLD-MCNC: 84 MG/DL (ref 70–99)
GLUCOSE BLD-MCNC: 89 MG/DL (ref 70–99)
GLUCOSE BLD-MCNC: 89 MG/DL (ref 70–99)
GLUCOSE BLD-MCNC: 91 MG/DL (ref 70–99)
GLUCOSE BLD-MCNC: 91 MG/DL (ref 70–99)
GLUCOSE BLD-MCNC: 98 MG/DL (ref 70–99)
GLUCOSE BLDC GLUCOMTR-MCNC: 111 MG/DL (ref 70–99)
GLUCOSE UR-MCNC: NEGATIVE MG/DL
HAV IGM SER QL: 2.1 MG/DL (ref 1.6–2.6)
HAV IGM SER QL: 2.1 MG/DL (ref 1.6–2.6)
HAV IGM SER QL: 2.2 MG/DL (ref 1.6–2.6)
HCT VFR BLD AUTO: 25.8 % (ref 35–48)
HCT VFR BLD AUTO: 26.1 % (ref 35–48)
HCT VFR BLD AUTO: 26.2 % (ref 35–48)
HCT VFR BLD AUTO: 30.5 % (ref 35–48)
HGB BLD-MCNC: 10.5 G/DL (ref 12–16)
HGB BLD-MCNC: 8.6 G/DL (ref 12–16)
HGB BLD-MCNC: 8.6 G/DL (ref 12–16)
HGB BLD-MCNC: 8.9 G/DL (ref 12–16)
IMM GRANULOCYTES # BLD AUTO: 0.01 X10(3) UL (ref 0–1)
IMM GRANULOCYTES NFR BLD: 0.2 %
INR BLD: 1.58 (ref 0.9–1.2)
INR BLD: 1.63 (ref 0.9–1.2)
INR BLD: 1.64 (ref 0.9–1.2)
INR BLD: 1.92 (ref 0.9–1.2)
LYMPHOCYTES # BLD AUTO: 1.08 X10(3) UL (ref 1–4)
LYMPHOCYTES # BLD AUTO: 1.35 X10(3) UL (ref 1–4)
LYMPHOCYTES # BLD AUTO: 1.37 X10(3) UL (ref 1–4)
LYMPHOCYTES # BLD AUTO: 1.59 X10(3) UL (ref 1–4)
LYMPHOCYTES NFR BLD AUTO: 17.2 %
LYMPHOCYTES NFR BLD AUTO: 27.6 %
LYMPHOCYTES NFR BLD AUTO: 31.9 %
LYMPHOCYTES NFR BLD AUTO: 34.5 %
LYMPHOCYTES NFR FLD: 26 %
M PROTEIN MFR SERPL ELPH: 7 G/DL (ref 6.4–8.2)
MCH RBC QN AUTO: 33.5 PG (ref 26–34)
MCH RBC QN AUTO: 34.3 PG (ref 26–34)
MCH RBC QN AUTO: 34.4 PG (ref 26–34)
MCH RBC QN AUTO: 34.6 PG (ref 26–34)
MCHC RBC AUTO-ENTMCNC: 32.8 G/DL (ref 31–37)
MCHC RBC AUTO-ENTMCNC: 33.3 G/DL (ref 31–37)
MCHC RBC AUTO-ENTMCNC: 34.1 G/DL (ref 31–37)
MCHC RBC AUTO-ENTMCNC: 34.4 G/DL (ref 31–37)
MCV RBC AUTO: 100 FL (ref 80–100)
MCV RBC AUTO: 101.6 FL (ref 80–100)
MCV RBC AUTO: 101.9 FL (ref 80–100)
MCV RBC AUTO: 102.8 FL (ref 80–100)
MONOCYTES # BLD AUTO: 0.76 X10(3) UL (ref 0.1–1)
MONOCYTES # BLD AUTO: 0.77 X10(3) UL (ref 0.1–1)
MONOCYTES # BLD AUTO: 0.89 X10(3) UL (ref 0.1–1)
MONOCYTES # BLD AUTO: 0.98 X10(3) UL (ref 0.1–1)
MONOCYTES NFR BLD AUTO: 15.6 %
MONOCYTES NFR BLD AUTO: 16.5 %
MONOCYTES NFR BLD AUTO: 17.9 %
MONOCYTES NFR BLD AUTO: 18.2 %
MONOCYTES NFR FLD: 69 %
NEUTROPHILS # BLD AUTO: 1.85 X10 (3) UL (ref 1.5–7.7)
NEUTROPHILS # BLD AUTO: 1.85 X10 (3) UL (ref 1.5–7.7)
NEUTROPHILS # BLD AUTO: 1.85 X10(3) UL (ref 1.5–7.7)
NEUTROPHILS # BLD AUTO: 1.85 X10(3) UL (ref 1.5–7.7)
NEUTROPHILS # BLD AUTO: 2.5 X10 (3) UL (ref 1.5–7.7)
NEUTROPHILS # BLD AUTO: 2.5 X10(3) UL (ref 1.5–7.7)
NEUTROPHILS # BLD AUTO: 4.11 X10 (3) UL (ref 1.5–7.7)
NEUTROPHILS # BLD AUTO: 4.11 X10(3) UL (ref 1.5–7.7)
NEUTROPHILS NFR BLD AUTO: 40.1 %
NEUTROPHILS NFR BLD AUTO: 43.1 %
NEUTROPHILS NFR BLD AUTO: 51.2 %
NEUTROPHILS NFR BLD AUTO: 65.4 %
NEUTROPHILS NFR FLD: 4 %
NITRITE UR QL STRIP.AUTO: POSITIVE
OSMOLALITY SERPL CALC.SUM OF ELEC: 277 MOSM/KG (ref 275–295)
OSMOLALITY SERPL CALC.SUM OF ELEC: 281 MOSM/KG (ref 275–295)
OSMOLALITY SERPL CALC.SUM OF ELEC: 283 MOSM/KG (ref 275–295)
OSMOLALITY SERPL CALC.SUM OF ELEC: 287 MOSM/KG (ref 275–295)
OSMOLALITY SERPL CALC.SUM OF ELEC: 287 MOSM/KG (ref 275–295)
PH UR: 6 [PH] (ref 5–8)
PHOSPHATE SERPL-MCNC: 1.3 MG/DL (ref 2.5–4.9)
PHOSPHATE SERPL-MCNC: 2 MG/DL (ref 2.5–4.9)
PHOSPHATE SERPL-MCNC: 2.8 MG/DL (ref 2.5–4.9)
PLATELET # BLD AUTO: 124 10(3)UL (ref 150–450)
PLATELET # BLD AUTO: 127 10(3)UL (ref 150–450)
PLATELET # BLD AUTO: 129 10(3)UL (ref 150–450)
PLATELET # BLD AUTO: 157 10(3)UL (ref 150–450)
POTASSIUM SERPL-SCNC: 2.9 MMOL/L (ref 3.5–5.1)
POTASSIUM SERPL-SCNC: 2.9 MMOL/L (ref 3.5–5.1)
POTASSIUM SERPL-SCNC: 4 MMOL/L (ref 3.5–5.1)
POTASSIUM SERPL-SCNC: 4.3 MMOL/L (ref 3.5–5.1)
POTASSIUM SERPL-SCNC: 4.3 MMOL/L (ref 3.5–5.1)
POTASSIUM SERPL-SCNC: 4.5 MMOL/L (ref 3.5–5.1)
PROT PRT-MCNC: <1 G/DL
PROT UR-MCNC: 30 MG/DL
PROTHROMBIN TIME: 18.9 SECONDS (ref 11.8–14.5)
PROTHROMBIN TIME: 19.3 SECONDS (ref 11.8–14.5)
PROTHROMBIN TIME: 19.4 SECONDS (ref 11.8–14.5)
PROTHROMBIN TIME: 22.1 SECONDS (ref 11.8–14.5)
RBC # BLD AUTO: 2.51 X10(6)UL (ref 3.8–5.3)
RBC # BLD AUTO: 2.57 X10(6)UL (ref 3.8–5.3)
RBC # BLD AUTO: 2.57 X10(6)UL (ref 3.8–5.3)
RBC # BLD AUTO: 3.05 X10(6)UL (ref 3.8–5.3)
RBC # FLD: 260 /CUMM (ref ?–1)
RBC #/AREA URNS AUTO: 4 /HPF
SODIUM SERPL-SCNC: 133 MMOL/L (ref 136–145)
SODIUM SERPL-SCNC: 136 MMOL/L (ref 136–145)
SODIUM SERPL-SCNC: 137 MMOL/L (ref 136–145)
SODIUM SERPL-SCNC: 139 MMOL/L (ref 136–145)
SODIUM SERPL-SCNC: 139 MMOL/L (ref 136–145)
SP GR UR STRIP: 1.02 (ref 1–1.03)
TURBIDITY CSF QL: CLEAR
UROBILINOGEN UR STRIP-ACNC: 4
WBC # BLD AUTO: 4.3 X10(3) UL (ref 4–11)
WBC # BLD AUTO: 4.6 X10(3) UL (ref 4–11)
WBC # BLD AUTO: 4.9 X10(3) UL (ref 4–11)
WBC # BLD AUTO: 6.3 X10(3) UL (ref 4–11)
WBC # FLD: 37 /CUMM (ref ?–1)
WBC #/AREA URNS AUTO: 220 /HPF
WBC OTHER NFR FLD: 1 %

## 2020-01-01 PROCEDURE — 83540 ASSAY OF IRON: CPT | Performed by: HOSPITALIST

## 2020-01-01 PROCEDURE — 85610 PROTHROMBIN TIME: CPT | Performed by: HOSPITALIST

## 2020-01-01 PROCEDURE — 97162 PT EVAL MOD COMPLEX 30 MIN: CPT

## 2020-01-01 PROCEDURE — 93005 ELECTROCARDIOGRAM TRACING: CPT

## 2020-01-01 PROCEDURE — 87205 SMEAR GRAM STAIN: CPT | Performed by: INTERNAL MEDICINE

## 2020-01-01 PROCEDURE — 86901 BLOOD TYPING SEROLOGIC RH(D): CPT | Performed by: HOSPITALIST

## 2020-01-01 PROCEDURE — 87086 URINE CULTURE/COLONY COUNT: CPT | Performed by: EMERGENCY MEDICINE

## 2020-01-01 PROCEDURE — 80076 HEPATIC FUNCTION PANEL: CPT | Performed by: EMERGENCY MEDICINE

## 2020-01-01 PROCEDURE — 87186 SC STD MICRODIL/AGAR DIL: CPT | Performed by: EMERGENCY MEDICINE

## 2020-01-01 PROCEDURE — 0W9G3ZX DRAINAGE OF PERITONEAL CAVITY, PERCUTANEOUS APPROACH, DIAGNOSTIC: ICD-10-PCS | Performed by: RADIOLOGY

## 2020-01-01 PROCEDURE — 84466 ASSAY OF TRANSFERRIN: CPT | Performed by: HOSPITALIST

## 2020-01-01 PROCEDURE — 0DJ08ZZ INSPECTION OF UPPER INTESTINAL TRACT, VIA NATURAL OR ARTIFICIAL OPENING ENDOSCOPIC: ICD-10-PCS | Performed by: INTERNAL MEDICINE

## 2020-01-01 PROCEDURE — 87077 CULTURE AEROBIC IDENTIFY: CPT | Performed by: EMERGENCY MEDICINE

## 2020-01-01 PROCEDURE — 85610 PROTHROMBIN TIME: CPT | Performed by: EMERGENCY MEDICINE

## 2020-01-01 PROCEDURE — 81001 URINALYSIS AUTO W/SCOPE: CPT | Performed by: EMERGENCY MEDICINE

## 2020-01-01 PROCEDURE — C9113 INJ PANTOPRAZOLE SODIUM, VIA: HCPCS | Performed by: HOSPITALIST

## 2020-01-01 PROCEDURE — 88112 CYTOPATH CELL ENHANCE TECH: CPT | Performed by: INTERNAL MEDICINE

## 2020-01-01 PROCEDURE — 84157 ASSAY OF PROTEIN OTHER: CPT | Performed by: INTERNAL MEDICINE

## 2020-01-01 PROCEDURE — 87070 CULTURE OTHR SPECIMN AEROBIC: CPT | Performed by: INTERNAL MEDICINE

## 2020-01-01 PROCEDURE — 82140 ASSAY OF AMMONIA: CPT | Performed by: EMERGENCY MEDICINE

## 2020-01-01 PROCEDURE — 86900 BLOOD TYPING SEROLOGIC ABO: CPT | Performed by: HOSPITALIST

## 2020-01-01 PROCEDURE — 97535 SELF CARE MNGMENT TRAINING: CPT

## 2020-01-01 PROCEDURE — 82272 OCCULT BLD FECES 1-3 TESTS: CPT

## 2020-01-01 PROCEDURE — 97166 OT EVAL MOD COMPLEX 45 MIN: CPT

## 2020-01-01 PROCEDURE — 0PU43JZ SUPPLEMENT THORACIC VERTEBRA WITH SYNTHETIC SUBSTITUTE, PERCUTANEOUS APPROACH: ICD-10-PCS | Performed by: RADIOLOGY

## 2020-01-01 PROCEDURE — 85025 COMPLETE CBC W/AUTO DIFF WBC: CPT | Performed by: INTERNAL MEDICINE

## 2020-01-01 PROCEDURE — 85610 PROTHROMBIN TIME: CPT | Performed by: INTERNAL MEDICINE

## 2020-01-01 PROCEDURE — 83735 ASSAY OF MAGNESIUM: CPT | Performed by: INTERNAL MEDICINE

## 2020-01-01 PROCEDURE — 85730 THROMBOPLASTIN TIME PARTIAL: CPT | Performed by: EMERGENCY MEDICINE

## 2020-01-01 PROCEDURE — 80053 COMPREHEN METABOLIC PANEL: CPT | Performed by: EMERGENCY MEDICINE

## 2020-01-01 PROCEDURE — 82042 OTHER SOURCE ALBUMIN QUAN EA: CPT | Performed by: INTERNAL MEDICINE

## 2020-01-01 PROCEDURE — 85025 COMPLETE CBC W/AUTO DIFF WBC: CPT

## 2020-01-01 PROCEDURE — 49083 ABD PARACENTESIS W/IMAGING: CPT | Performed by: INTERNAL MEDICINE

## 2020-01-01 PROCEDURE — 22515 PERQ VERTEBRAL AUGMENTATION: CPT

## 2020-01-01 PROCEDURE — 75989 ABSCESS DRAINAGE UNDER X-RAY: CPT

## 2020-01-01 PROCEDURE — 85025 COMPLETE CBC W/AUTO DIFF WBC: CPT | Performed by: HOSPITALIST

## 2020-01-01 PROCEDURE — 99285 EMERGENCY DEPT VISIT HI MDM: CPT

## 2020-01-01 PROCEDURE — 84132 ASSAY OF SERUM POTASSIUM: CPT | Performed by: INTERNAL MEDICINE

## 2020-01-01 PROCEDURE — P9047 ALBUMIN (HUMAN), 25%, 50ML: HCPCS | Performed by: HOSPITALIST

## 2020-01-01 PROCEDURE — 97530 THERAPEUTIC ACTIVITIES: CPT

## 2020-01-01 PROCEDURE — 85025 COMPLETE CBC W/AUTO DIFF WBC: CPT | Performed by: EMERGENCY MEDICINE

## 2020-01-01 PROCEDURE — 80069 RENAL FUNCTION PANEL: CPT | Performed by: INTERNAL MEDICINE

## 2020-01-01 PROCEDURE — 80320 DRUG SCREEN QUANTALCOHOLS: CPT | Performed by: EMERGENCY MEDICINE

## 2020-01-01 PROCEDURE — 80053 COMPREHEN METABOLIC PANEL: CPT

## 2020-01-01 PROCEDURE — 96361 HYDRATE IV INFUSION ADD-ON: CPT

## 2020-01-01 PROCEDURE — 49418 INSERT TUN IP CATH PERC: CPT

## 2020-01-01 PROCEDURE — 89051 BODY FLUID CELL COUNT: CPT | Performed by: INTERNAL MEDICINE

## 2020-01-01 PROCEDURE — 97116 GAIT TRAINING THERAPY: CPT

## 2020-01-01 PROCEDURE — 32550 INSERT PLEURAL CATH: CPT

## 2020-01-01 PROCEDURE — 36415 COLL VENOUS BLD VENIPUNCTURE: CPT

## 2020-01-01 PROCEDURE — 80053 COMPREHEN METABOLIC PANEL: CPT | Performed by: HOSPITALIST

## 2020-01-01 PROCEDURE — 85018 HEMOGLOBIN: CPT | Performed by: HOSPITALIST

## 2020-01-01 PROCEDURE — 83735 ASSAY OF MAGNESIUM: CPT | Performed by: HOSPITALIST

## 2020-01-01 PROCEDURE — 74177 CT ABD & PELVIS W/CONTRAST: CPT | Performed by: EMERGENCY MEDICINE

## 2020-01-01 PROCEDURE — 36430 TRANSFUSION BLD/BLD COMPNT: CPT

## 2020-01-01 PROCEDURE — 89050 BODY FLUID CELL COUNT: CPT | Performed by: INTERNAL MEDICINE

## 2020-01-01 PROCEDURE — 30233R1 TRANSFUSION OF NONAUTOLOGOUS PLATELETS INTO PERIPHERAL VEIN, PERCUTANEOUS APPROACH: ICD-10-PCS | Performed by: HOSPITALIST

## 2020-01-01 PROCEDURE — 99152 MOD SED SAME PHYS/QHP 5/>YRS: CPT

## 2020-01-01 PROCEDURE — 0PS43ZZ REPOSITION THORACIC VERTEBRA, PERCUTANEOUS APPROACH: ICD-10-PCS | Performed by: RADIOLOGY

## 2020-01-01 PROCEDURE — 82728 ASSAY OF FERRITIN: CPT | Performed by: HOSPITALIST

## 2020-01-01 PROCEDURE — 80048 BASIC METABOLIC PNL TOTAL CA: CPT | Performed by: INTERNAL MEDICINE

## 2020-01-01 PROCEDURE — 71045 X-RAY EXAM CHEST 1 VIEW: CPT | Performed by: EMERGENCY MEDICINE

## 2020-01-01 PROCEDURE — 96365 THER/PROPH/DIAG IV INF INIT: CPT

## 2020-01-01 PROCEDURE — 76705 ECHO EXAM OF ABDOMEN: CPT | Performed by: INTERNAL MEDICINE

## 2020-01-01 PROCEDURE — 80048 BASIC METABOLIC PNL TOTAL CA: CPT | Performed by: EMERGENCY MEDICINE

## 2020-01-01 PROCEDURE — 93010 ELECTROCARDIOGRAM REPORT: CPT | Performed by: EMERGENCY MEDICINE

## 2020-01-01 PROCEDURE — 82962 GLUCOSE BLOOD TEST: CPT

## 2020-01-01 PROCEDURE — 22513 PERQ VERTEBRAL AUGMENTATION: CPT

## 2020-01-01 PROCEDURE — 0W9G3ZZ DRAINAGE OF PERITONEAL CAVITY, PERCUTANEOUS APPROACH: ICD-10-PCS | Performed by: RADIOLOGY

## 2020-01-01 PROCEDURE — 86850 RBC ANTIBODY SCREEN: CPT | Performed by: HOSPITALIST

## 2020-01-01 PROCEDURE — 85014 HEMATOCRIT: CPT | Performed by: HOSPITALIST

## 2020-01-01 PROCEDURE — 86920 COMPATIBILITY TEST SPIN: CPT

## 2020-01-01 PROCEDURE — 85060 BLOOD SMEAR INTERPRETATION: CPT | Performed by: HOSPITALIST

## 2020-01-01 PROCEDURE — 0W9G30Z DRAINAGE OF PERITONEAL CAVITY WITH DRAINAGE DEVICE, PERCUTANEOUS APPROACH: ICD-10-PCS | Performed by: RADIOLOGY

## 2020-01-01 RX ORDER — SODIUM CHLORIDE 9 MG/ML
INJECTION, SOLUTION INTRAVENOUS CONTINUOUS
Status: DISCONTINUED | OUTPATIENT
Start: 2020-01-01 | End: 2020-01-01

## 2020-01-01 RX ORDER — LIDOCAINE HYDROCHLORIDE 20 MG/ML
INJECTION, SOLUTION EPIDURAL; INFILTRATION; INTRACAUDAL; PERINEURAL
Status: COMPLETED
Start: 2020-01-01 | End: 2020-01-01

## 2020-01-01 RX ORDER — SODIUM CHLORIDE, SODIUM LACTATE, POTASSIUM CHLORIDE, CALCIUM CHLORIDE 600; 310; 30; 20 MG/100ML; MG/100ML; MG/100ML; MG/100ML
INJECTION, SOLUTION INTRAVENOUS CONTINUOUS PRN
Status: DISCONTINUED | OUTPATIENT
Start: 2020-01-01 | End: 2020-01-01 | Stop reason: SURG

## 2020-01-01 RX ORDER — ALBUMIN (HUMAN) 12.5 G/50ML
100 SOLUTION INTRAVENOUS AS NEEDED
Status: DISCONTINUED | OUTPATIENT
Start: 2020-01-01 | End: 2020-01-01

## 2020-01-01 RX ORDER — MONTELUKAST SODIUM 10 MG/1
10 TABLET ORAL DAILY
COMMUNITY

## 2020-01-01 RX ORDER — NALOXONE HYDROCHLORIDE 0.4 MG/ML
80 INJECTION, SOLUTION INTRAMUSCULAR; INTRAVENOUS; SUBCUTANEOUS AS NEEDED
Status: CANCELLED | OUTPATIENT
Start: 2020-01-01 | End: 2020-01-01

## 2020-01-01 RX ORDER — ROCURONIUM BROMIDE 10 MG/ML
INJECTION, SOLUTION INTRAVENOUS AS NEEDED
Status: DISCONTINUED | OUTPATIENT
Start: 2020-01-01 | End: 2020-01-01 | Stop reason: SURG

## 2020-01-01 RX ORDER — POTASSIUM CHLORIDE 20 MEQ/1
40 TABLET, EXTENDED RELEASE ORAL ONCE
Status: COMPLETED | OUTPATIENT
Start: 2020-01-01 | End: 2020-01-01

## 2020-01-01 RX ORDER — LACTULOSE 10 G/15ML
10 SOLUTION ORAL ONCE
Status: COMPLETED | OUTPATIENT
Start: 2020-01-01 | End: 2020-01-01

## 2020-01-01 RX ORDER — ALBUMIN (HUMAN) 12.5 G/50ML
25 SOLUTION INTRAVENOUS EVERY 6 HOURS
Status: COMPLETED | OUTPATIENT
Start: 2020-01-01 | End: 2020-01-01

## 2020-01-01 RX ORDER — FLUTICASONE PROPIONATE 50 MCG
2 SPRAY, SUSPENSION (ML) NASAL DAILY
Status: DISCONTINUED | OUTPATIENT
Start: 2020-01-01 | End: 2020-01-01

## 2020-01-01 RX ORDER — LACTULOSE 10 G/15ML
10 SOLUTION ORAL 2 TIMES DAILY
Status: DISCONTINUED | OUTPATIENT
Start: 2020-01-01 | End: 2020-01-01

## 2020-01-01 RX ORDER — SODIUM CHLORIDE 0.9 % (FLUSH) 0.9 %
3 SYRINGE (ML) INJECTION AS NEEDED
Status: DISCONTINUED | OUTPATIENT
Start: 2020-01-01 | End: 2020-01-01

## 2020-01-01 RX ORDER — LACTULOSE 10 G/15ML
30 SOLUTION ORAL ONCE
Status: COMPLETED | OUTPATIENT
Start: 2020-01-01 | End: 2020-01-01

## 2020-01-01 RX ORDER — POTASSIUM CHLORIDE 20 MEQ/1
40 TABLET, EXTENDED RELEASE ORAL EVERY 4 HOURS
Status: COMPLETED | OUTPATIENT
Start: 2020-01-01 | End: 2020-01-01

## 2020-01-01 RX ORDER — ONDANSETRON 2 MG/ML
4 INJECTION INTRAMUSCULAR; INTRAVENOUS EVERY 6 HOURS PRN
Status: DISCONTINUED | OUTPATIENT
Start: 2020-01-01 | End: 2020-01-01

## 2020-01-01 RX ORDER — TRAZODONE HYDROCHLORIDE 50 MG/1
50 TABLET ORAL NIGHTLY PRN
Status: DISCONTINUED | OUTPATIENT
Start: 2020-01-01 | End: 2020-01-01

## 2020-01-01 RX ORDER — SPIRONOLACTONE 25 MG/1
25 TABLET ORAL DAILY
Qty: 30 TABLET | Refills: 1 | Status: SHIPPED | OUTPATIENT
Start: 2020-01-01 | End: 2020-01-01

## 2020-01-01 RX ORDER — PANTOPRAZOLE SODIUM 40 MG/1
40 TABLET, DELAYED RELEASE ORAL
Status: DISCONTINUED | OUTPATIENT
Start: 2020-01-01 | End: 2020-01-01

## 2020-01-01 RX ORDER — LACTULOSE 10 G/15ML
10 SOLUTION ORAL 2 TIMES DAILY
Qty: 1 BOTTLE | Refills: 1 | Status: SHIPPED | OUTPATIENT
Start: 2020-01-01 | End: 2020-01-01

## 2020-01-01 RX ORDER — METOCLOPRAMIDE HYDROCHLORIDE 5 MG/ML
10 INJECTION INTRAMUSCULAR; INTRAVENOUS EVERY 8 HOURS PRN
Status: DISCONTINUED | OUTPATIENT
Start: 2020-01-01 | End: 2020-01-01

## 2020-01-01 RX ORDER — ACETAMINOPHEN 325 MG/1
650 TABLET ORAL EVERY 6 HOURS PRN
Status: DISCONTINUED | OUTPATIENT
Start: 2020-01-01 | End: 2020-01-01

## 2020-01-01 RX ORDER — GABAPENTIN 300 MG/1
300 CAPSULE ORAL NIGHTLY
Refills: 0 | Status: SHIPPED | COMMUNITY
Start: 2020-01-01 | End: 2020-01-01

## 2020-01-01 RX ORDER — GABAPENTIN 300 MG/1
300 CAPSULE ORAL NIGHTLY
Status: DISCONTINUED | OUTPATIENT
Start: 2020-01-01 | End: 2020-01-01

## 2020-01-01 RX ORDER — SPIRONOLACTONE 50 MG/1
50 TABLET, FILM COATED ORAL 2 TIMES DAILY
Status: DISCONTINUED | OUTPATIENT
Start: 2020-01-01 | End: 2020-01-01

## 2020-01-01 RX ORDER — LACTULOSE 10 G/15ML
20 SOLUTION ORAL 2 TIMES DAILY
Status: DISCONTINUED | OUTPATIENT
Start: 2020-01-01 | End: 2020-01-01

## 2020-01-01 RX ORDER — MELATONIN
325 DAILY
Status: DISCONTINUED | OUTPATIENT
Start: 2020-01-01 | End: 2020-01-01

## 2020-01-01 RX ORDER — SODIUM CHLORIDE, SODIUM LACTATE, POTASSIUM CHLORIDE, CALCIUM CHLORIDE 600; 310; 30; 20 MG/100ML; MG/100ML; MG/100ML; MG/100ML
INJECTION, SOLUTION INTRAVENOUS CONTINUOUS
Status: CANCELLED | OUTPATIENT
Start: 2020-01-01

## 2020-01-01 RX ORDER — MORPHINE SULFATE 20 MG/ML
5 SOLUTION ORAL EVERY 2 HOUR PRN
Status: ON HOLD | COMMUNITY
End: 2020-01-01

## 2020-01-01 RX ORDER — FUROSEMIDE 40 MG/1
40 TABLET ORAL DAILY
Status: DISCONTINUED | OUTPATIENT
Start: 2020-01-01 | End: 2020-01-01

## 2020-01-01 RX ORDER — MIDAZOLAM HYDROCHLORIDE 1 MG/ML
INJECTION INTRAMUSCULAR; INTRAVENOUS
Status: COMPLETED
Start: 2020-01-01 | End: 2020-01-01

## 2020-01-01 RX ORDER — NEOSTIGMINE METHYLSULFATE 1 MG/ML
INJECTION INTRAVENOUS AS NEEDED
Status: DISCONTINUED | OUTPATIENT
Start: 2020-01-01 | End: 2020-01-01 | Stop reason: SURG

## 2020-01-01 RX ORDER — ONDANSETRON 2 MG/ML
INJECTION INTRAMUSCULAR; INTRAVENOUS AS NEEDED
Status: DISCONTINUED | OUTPATIENT
Start: 2020-01-01 | End: 2020-01-01 | Stop reason: SURG

## 2020-01-01 RX ORDER — MONTELUKAST SODIUM 10 MG/1
10 TABLET ORAL DAILY
Status: DISCONTINUED | OUTPATIENT
Start: 2020-01-01 | End: 2020-01-01

## 2020-01-01 RX ORDER — SPIRONOLACTONE 25 MG/1
25 TABLET ORAL DAILY
Status: DISCONTINUED | OUTPATIENT
Start: 2020-01-01 | End: 2020-01-01

## 2020-01-01 RX ORDER — GABAPENTIN 100 MG/1
100 CAPSULE ORAL 3 TIMES DAILY
Status: DISCONTINUED | OUTPATIENT
Start: 2020-01-01 | End: 2020-01-01

## 2020-01-01 RX ORDER — POTASSIUM CHLORIDE 14.9 MG/ML
20 INJECTION INTRAVENOUS ONCE
Status: COMPLETED | OUTPATIENT
Start: 2020-01-01 | End: 2020-01-01

## 2020-01-01 RX ORDER — HYDROCODONE BITARTRATE AND ACETAMINOPHEN 5; 325 MG/1; MG/1
1 TABLET ORAL EVERY 6 HOURS PRN
Status: DISCONTINUED | OUTPATIENT
Start: 2020-01-01 | End: 2020-01-01

## 2020-01-01 RX ORDER — LACTULOSE 10 G/15ML
20 SOLUTION ORAL 3 TIMES DAILY
Status: DISCONTINUED | OUTPATIENT
Start: 2020-01-01 | End: 2020-01-01

## 2020-01-01 RX ORDER — POTASSIUM CHLORIDE 20 MEQ/1
40 TABLET, EXTENDED RELEASE ORAL EVERY 4 HOURS
Status: DISCONTINUED | OUTPATIENT
Start: 2020-01-01 | End: 2020-01-01

## 2020-01-01 RX ORDER — SODIUM CHLORIDE 9 MG/ML
INJECTION, SOLUTION INTRAVENOUS ONCE
Status: COMPLETED | OUTPATIENT
Start: 2020-01-01 | End: 2020-01-01

## 2020-01-01 RX ORDER — MIDAZOLAM HYDROCHLORIDE 1 MG/ML
INJECTION INTRAMUSCULAR; INTRAVENOUS
Status: DISCONTINUED
Start: 2020-01-01 | End: 2020-01-01 | Stop reason: WASHOUT

## 2020-01-01 RX ORDER — MONTELUKAST SODIUM 10 MG/1
10 TABLET ORAL NIGHTLY
Status: DISCONTINUED | OUTPATIENT
Start: 2020-01-01 | End: 2020-01-01

## 2020-01-01 RX ORDER — CEPHALEXIN 500 MG/1
500 CAPSULE ORAL 2 TIMES DAILY
Qty: 8 CAPSULE | Refills: 0 | Status: SHIPPED | OUTPATIENT
Start: 2020-01-01 | End: 2020-01-01

## 2020-01-01 RX ORDER — ACETAMINOPHEN 500 MG
500 TABLET ORAL EVERY 6 HOURS PRN
Status: DISCONTINUED | OUTPATIENT
Start: 2020-01-01 | End: 2020-01-01

## 2020-01-01 RX ORDER — GLYCOPYRROLATE 0.2 MG/ML
INJECTION, SOLUTION INTRAMUSCULAR; INTRAVENOUS AS NEEDED
Status: DISCONTINUED | OUTPATIENT
Start: 2020-01-01 | End: 2020-01-01 | Stop reason: SURG

## 2020-01-01 RX ORDER — GABAPENTIN 100 MG/1
100 CAPSULE ORAL 3 TIMES DAILY
COMMUNITY
End: 2020-01-01

## 2020-01-01 RX ORDER — LIDOCAINE HYDROCHLORIDE 10 MG/ML
INJECTION, SOLUTION EPIDURAL; INFILTRATION; INTRACAUDAL; PERINEURAL AS NEEDED
Status: DISCONTINUED | OUTPATIENT
Start: 2020-01-01 | End: 2020-01-01 | Stop reason: SURG

## 2020-01-01 RX ORDER — HEPARIN SODIUM 5000 [USP'U]/ML
5000 INJECTION, SOLUTION INTRAVENOUS; SUBCUTANEOUS EVERY 8 HOURS SCHEDULED
Status: DISCONTINUED | OUTPATIENT
Start: 2020-01-01 | End: 2020-01-01

## 2020-01-01 RX ORDER — POTASSIUM CHLORIDE 14.9 MG/ML
20 INJECTION INTRAVENOUS ONCE
Status: DISCONTINUED | OUTPATIENT
Start: 2020-01-01 | End: 2020-01-01

## 2020-01-01 RX ADMIN — SODIUM CHLORIDE: 9 INJECTION, SOLUTION INTRAVENOUS at 15:32:00

## 2020-01-01 RX ADMIN — NEOSTIGMINE METHYLSULFATE 3 MG: 1 INJECTION INTRAVENOUS at 16:24:00

## 2020-01-01 RX ADMIN — SODIUM CHLORIDE, SODIUM LACTATE, POTASSIUM CHLORIDE, CALCIUM CHLORIDE: 600; 310; 30; 20 INJECTION, SOLUTION INTRAVENOUS at 10:37:00

## 2020-01-01 RX ADMIN — GLYCOPYRROLATE 0.6 MG: 0.2 INJECTION, SOLUTION INTRAMUSCULAR; INTRAVENOUS at 16:24:00

## 2020-01-01 RX ADMIN — ROCURONIUM BROMIDE 5 MG: 10 INJECTION, SOLUTION INTRAVENOUS at 15:35:00

## 2020-01-01 RX ADMIN — ONDANSETRON 4 MG: 2 INJECTION INTRAMUSCULAR; INTRAVENOUS at 16:26:00

## 2020-01-01 RX ADMIN — ROCURONIUM BROMIDE 10 MG: 10 INJECTION, SOLUTION INTRAVENOUS at 15:53:00

## 2020-01-01 RX ADMIN — LIDOCAINE HYDROCHLORIDE 40 MG: 10 INJECTION, SOLUTION EPIDURAL; INFILTRATION; INTRACAUDAL; PERINEURAL at 15:36:00

## 2020-01-01 RX ADMIN — SODIUM CHLORIDE: 9 INJECTION, SOLUTION INTRAVENOUS at 15:56:00

## 2020-01-01 RX ADMIN — SODIUM CHLORIDE: 9 INJECTION, SOLUTION INTRAVENOUS at 16:34:00

## 2020-01-27 PROBLEM — R41.82 ALTERED MENTAL STATUS, UNSPECIFIED ALTERED MENTAL STATUS TYPE: Status: ACTIVE | Noted: 2020-01-01

## 2020-01-27 PROBLEM — N30.01 ACUTE CYSTITIS WITH HEMATURIA: Status: ACTIVE | Noted: 2020-01-01

## 2020-01-27 PROBLEM — G93.40 ENCEPHALOPATHY: Status: ACTIVE | Noted: 2020-01-01

## 2020-01-27 PROBLEM — E86.0 DEHYDRATION: Status: ACTIVE | Noted: 2020-01-01

## 2020-01-27 NOTE — ED NOTES
Patient here with increased confusion per family. Patient has history of liver cirrhosis. Patient takes lactulose daily. Patient has been having diarrhea more frequently.

## 2020-01-27 NOTE — ED INITIAL ASSESSMENT (HPI)
Pt presents with daughter who states pt has had diarrhea, poor intake and confusion x 3 days.  Pt is alert and verbal. Denies pain

## 2020-01-27 NOTE — ED PROVIDER NOTES
Patient Seen in: [facility] Emergency Department      History   Patient presents with:  Altered Mental Status    Stated Complaint: confusion     HPI    71-year-old female presents the ER with altered mental status today.   Patient's niece states th confusion   Other systems are as noted in HPI. Constitutional and vital signs reviewed. All other systems reviewed and negative except as noted above.     Physical Exam     ED Triage Vitals   BP 01/27/20 1616 131/78   Pulse 01/27/20 1616 89   Resp 01/ Potassium 2.9 (*)     Alkaline Phosphatase 184 (*)     Bilirubin, Total 2.5 (*)     Albumin 2.8 (*)     A/G Ratio 0.7 (*)     All other components within normal limits   URINALYSIS WITH CULTURE REFLEX - Abnormal; Notable for the following components:    Cl MDM     70-year-old female presents the ER for altered mental status. Patient with positive UTI and mildly elevated  upper body level. Patient treated with Rocephin and will be admitted for altered mental status.   Admission disposition: 1/27/20

## 2020-01-28 NOTE — CONSULTS
[facility]    Report of Consultation    Kitty Denis Patient Status:  Inpatient    1950 MRN [id number]   Location [facility] 5SW/SE Attending aDvey Celis MD   [address] Day # 1 PCP Alisia Choi MD     Date of Admission: ENDOSCOPY   • ESOPHAGOGASTRODUODENOSCOPY (EGD) N/A 10/26/2018    Performed by Dimitris Knott MD at [facility] ENDOSCOPY   • [facility]     • HERNIA SURGERY  22/69/3359    umbilical hernia   • HERNIA UMBILICAL REPAIR ADULT N 100 mL MBP/ADD-vantage, 1 g, Intravenous, Q24H      Montelukast Sodium 10 MG Oral Tab, Take 1 tablet (10 mg total) by mouth nightly.  (Patient taking differently: Take 10 mg by mouth daily.  )  traZODone HCl 50 MG Oral Tab, Take 1 tablet (50 mg total) by mo rashes,no suspicious lesions  HEENT: atraumatic, normocephalic, oropharynx clear  NECK: supple,no adenopathy, no masses  LUNGS: clear to auscultation  CARDIO: RRR   GI: normal active BS, no tenderness on palpation, no masses, moderate ascites  EXTREMITIES: CONCLUSION:  1. Borderline cardiomegaly. .  Atherosclerotic aorta. 2. Bilateral peribronchial thickening with perihilar and lower lobe interstitial prominence. Differential would include viral process versus nonspecific interstitial pneumonitis.   Anay Adrian

## 2020-01-28 NOTE — PROGRESS NOTES
[facility] Hospitalist Progress Note                                                                   Silverio Sensor  12/12/1950    Subjective:  Feels okay, no family at bedside   Had one episode of diarrhea this AM 2.8*       Recent Labs   Lab 01/27/20  1641   PGLU 111*         Assessment/Plan:  Sandy You is a(n) 77year old female w/ PMHx of PEREZ Cirrhosis w/ EV ascites, hx of recurrent UTI, who presents w/ AMS.       PEREZ Cirrhosis   Encephalopathy - TME - like

## 2020-01-28 NOTE — CM/SW NOTE
MDO received for Advanced Directives. SW intern attempted to meet with pt, however, pt was off the floor for testing. Will re-attempt to see at a later time.     Monalisa Lainez, NIKKI intern

## 2020-01-28 NOTE — CM/SW NOTE
MDO to SW for advanced directives. SW met with pt and family at bedside and discussed forms. SW answered all pt questions. SW/CM to remain available for support and/or discharge planning.      SHIRLEY Garcia, [facility]  Social Work   [phone]

## 2020-01-28 NOTE — CONSULTS
[facility]    Report of Consultation    Jose Gorman Patient Status:  Inpatient    1950 MRN [id number]   Location [facility] 5SW/SE Attending Tamra Monzon MD   [facility] Day # 1 PCP Jorge Sandoval MD     Date of Admission: Sister 61        breast ca   • Breast Cancer Sister 62        dx at age 62   • Breast Cancer Sister 71        dx at age 71       Social History  Social History    Tobacco Use      Smoking status: Former Smoker        Packs/day: 1.00        Years: 20.00 tablets (60 mg total) by mouth daily. spironolactone 50 MG Oral Tab, Take 1.5 tablets (75 mg total) by mouth daily. HYDROcodone-acetaminophen 5-325 MG Oral Tab, Take 1 tablet by mouth every 4 (four) hours as needed.   gabapentin 300 MG Oral Cap, Take 1 ca 1.800 12/02/2019    MG 2.1 01/28/2020    PHOS 3.10 03/12/2019    B12 1,154 (H) 05/31/2019    ETOH <3 01/27/2020         Imaging:  Xr Chest Ap Portable  (cpt=71045)    Result Date: 1/27/2020  CONCLUSION:  1. Borderline cardiomegaly. .  Atherosclerotic aorta.

## 2020-01-28 NOTE — PLAN OF CARE
Problem: Patient Centered Care  Goal: Patient preferences are identified and integrated in the patient's plan of care  Description  Interventions:  - What would you like us to know as we care for you?   - Provide timely, complete, and accurate informatio impaired vision or hearing, underlying metabolic abnormalities, dehydration, psychiatric diagnoses, and notify attending MD/LIP  - [place] high risk fall precautions, as indicated  - Provide frequent short contacts to provide reality reorientation, refoc needed  Outcome: Progressing   Fluids stopped and fluid restriction followed. Patient received a paracentesis with 1, 060 out in RLQ.

## 2020-01-28 NOTE — PLAN OF CARE
Problem: Patient Centered Care  Goal: Patient preferences are identified and integrated in the patient's plan of care  Description  Interventions:  - What would you like us to know as we care for you?   - Provide timely, complete, and accurate informatio impaired vision or hearing, underlying metabolic abnormalities, dehydration, psychiatric diagnoses, and notify attending MD/LIP  - [place] high risk fall precautions, as indicated  - Provide frequent short contacts to provide reality reorientation, refoc needed  Outcome: Progressing   One small loose BM overnight. On IV Ceftriaxone. Norco PRN for chronic back pain. US abd today. Vitals stable.

## 2020-01-28 NOTE — H&P
[facility] Patient Status:  Emergency    1950 MRN [id number]   Location [address] Attending Anne Villareal DO   Hosp Day # 0 PCP Meagan Hurst MD     Date Heart Disorder Mother 61        mi   • Cancer Sister 61        breast ca   • Breast Cancer Sister 62        dx at age 62   • Breast Cancer Sister 71        dx at age 71     Social History:  Social History    Tobacco Use      Smoking status: Former Smoker (L) 01/27/2020    ALKPHO 184 (H) 01/27/2020    BILT 2.5 (H) 01/27/2020    TP 7.0 01/27/2020    AST 36 01/27/2020    ALT 15 01/27/2020    PTT 37.9 (H) 01/27/2020    INR 1.63 (H) 01/27/2020    TSH 1.800 12/02/2019    MG 2.4 08/21/2019    PHOS 3.10 03/12/2019

## 2020-01-28 NOTE — ED NOTES
Orders for admission, patient is aware of plan and ready to go upstairs.  Any questions, please call ED RN LEE BARRAZA at [address]

## 2020-01-28 NOTE — IMAGING NOTE
1352 Pt to ultrasound room scouts taken by [facility], [address]    [facility] Hx taken procedure explained questions answered     1209 Patient consented prior to [address] room arrival.     Pt to get albumin 25% 25 grams  no     1411 M BONITA MUNOZ  Here scanning completed

## 2020-01-29 NOTE — DIETARY NOTE
ADULT NUTRITION INITIAL ASSESSMENT    Pt is at high nutrition risk. Pt meets severe malnutrition criteria.       RECOMMENDATIONS TO MD:  See Nutrition Intervention     CRITERIA FOR MALNUTRITION DIAGNOSIS: Criteria for severe malnutrition diagnosis: chronic Educated patient/family on ways to increase oral intake and arranges high kcal/pro snacks. -  Diet:  Lifted Cardiac diet restrictions and will  Continue prescribed diet, 2 gm Na and 1800 ml fluid restrictions.    - Medical Food or Oral Nutrition Supplemen FOOD/NUTRITION RELATED HISTORY:   Current Appetite: Poor to fair  Intake: No intake yesterday but today pt ate 80% at breakfast.   Intake Meeting Needs: No, but supplements to maximize   Food Allergies: No   Cultural/Ethnic/Confucianist Preferences: None

## 2020-01-29 NOTE — PLAN OF CARE
Problem: Patient Centered Care  Goal: Patient preferences are identified and integrated in the patient's plan of care  Description  Interventions:  - What would you like us to know as we care for you?  \"My sisters had cirrhosis too\"  - Provide timely, com disturbance, including medications, impaired vision or hearing, underlying metabolic abnormalities, dehydration, psychiatric diagnoses, and notify attending MD/LIP  - [place] high risk fall precautions, as indicated  - Provide frequent short contacts to algorithm/standards of care as needed  Outcome: Progressing     Problem: SAFETY ADULT - FALL  Goal: Free from fall injury  Description  INTERVENTIONS:  - Assess pt frequently for physical needs  - Identify cognitive and physical deficits and behaviors that

## 2020-01-29 NOTE — PLAN OF CARE
A/O x3. Forgetful. VSS. Lactulose given to decrease ammonia level. Norco given. Paracentesis site clean dry intact. Fluid restriction 100ml per this shift. Up with one and a walker.    Problem: Patient Centered Care  Goal: Patient preferences are identified Confusion, delirium, dementia or psychosis is improved or at baseline  Description  INTERVENTIONS:  - Assess for possible contributors to thought disturbance, including medications, impaired vision or hearing, underlying metabolic abnormalities, dehydratio development  - Assess and document skin integrity  - Monitor for areas of redness and/or skin breakdown  - Initiate interventions, skin care algorithm/standards of care as needed  Outcome: Progressing

## 2020-01-29 NOTE — PROGRESS NOTES
[facility]    Progress Note    Serge Nim Patient Status:  Inpatient    1950 MRN [id number]   Location [facility] 5SW/SE Attending Leatha Sprague MD   [address] Day # 2 PCP Katrin Pickering MD       Subjective:     Sitting com 16:10          Us Ascites (cpt=[id number])    Result Date: 1/28/2020  CONCLUSION: REGION IMAGED: 4 quadrant abdomen for ascites. MASSES: None. No evident mass. FLUID COLLECTIONS: Diffuse abdominal ascites with the largest pocket in the right flank region.  OTHER

## 2020-01-29 NOTE — PLAN OF CARE
Problem: Patient Centered Care  Goal: Patient preferences are identified and integrated in the patient's plan of care  Description  Interventions:  - What would you like us to know as we care for you?  \"My sisters had cirrhosis too\"  - Provide timely, c disturbance, including medications, impaired vision or hearing, underlying metabolic abnormalities, dehydration, psychiatric diagnoses, and notify attending MD/LIP  - [place] high risk fall precautions, as indicated  - Provide frequent short contacts to algorithm/standards of care as needed  Outcome: Progressing     Problem: SAFETY ADULT - FALL  Goal: Free from fall injury  Description  INTERVENTIONS:  - Assess pt frequently for physical needs  - Identify cognitive and physical deficits and behaviors that

## 2020-01-30 NOTE — PROGRESS NOTES
Patient told by Dr. Jennifer Méndez this morning that she could be potentially discharged today. Pt reported abdominal pain to Dr. Jennifer Méndez at this time, MD examined patient. GI MD called, ok with d/c. Nephrologist called, wants to see patient before d/c.  Patient continu

## 2020-01-30 NOTE — CM/SW NOTE
CM notified by Konstantin Rossi RN that  would like Savannah Michel  at ID. Pt has hx with [facility]. Ref placed with Lorena, f2f done    / to remain available for support and/or discharge planning.      Mike Dia, RN    Ext [phone]

## 2020-01-30 NOTE — PLAN OF CARE
Problem: Patient Centered Care  Goal: Patient preferences are identified and integrated in the patient's plan of care  Description  Interventions:  - What would you like us to know as we care for you?  \"My sisters had cirrhosis too\"  - Provide timely, c disturbance, including medications, impaired vision or hearing, underlying metabolic abnormalities, dehydration, psychiatric diagnoses, and notify attending MD/LIP  - [place] high risk fall precautions, as indicated  - Provide frequent short contacts to algorithm/standards of care as needed  Outcome: Progressing     Problem: SAFETY ADULT - FALL  Goal: Free from fall injury  Description  INTERVENTIONS:  - Assess pt frequently for physical needs  - Identify cognitive and physical deficits and behaviors that

## 2020-01-30 NOTE — DISCHARGE SUMMARY
General Medicine Discharge Summary     Patient ID:  Esthela Amor  77year old  12/12/1950    Admit date: 1/27/2020    Discharge date and time: 1/30/20    Attending Physician: MD Joshua Núñez spironolactone.      PEREZ cirrhosis   - c/b HE and ascites   - pt on lactulose at home and diuretics   - follows with [facility] Dr. Soheila Lancaster     Anemia  - baseline around  9-10  - no BRBPR no melena        Consults: IP CONSULT TO HOSPITALIST  IP CONSULT TO SOCIAL bilaterally, no active wheezing  Abdomen: nontender, nondistended, intact BS  Extremities: no pedal edema   Neuro: CN inact, no focal deficits      Total time coordinating care for discharge: 39 min  Nereida Bentley MD  [facility] Hospitalist  [phone]

## 2020-01-30 NOTE — CDS QUERY
How to Answer this Query    1.) Click \"Edit Button\" on the toolbar  2.) Type an \"X\" in the bracket for the diagnosis that applies. (You may also add additional clinical details as you feel necessary to substantiate your response).    3.) Finally click \

## 2020-01-30 NOTE — HOME CARE LIAISON
Received referral from Marco A Avery. Met with patient at the bedside. Patient is refusing home health services at this time. Residential brochure provided with contact information. All questions addressed and answered. NAOMY Osorio notified.  Thank you for this re

## 2020-01-30 NOTE — PLAN OF CARE
Problem: Patient Centered Care  Goal: Patient preferences are identified and integrated in the patient's plan of care  Description  Interventions:  - What would you like us to know as we care for you?  \"My sisters had cirrhosis too\"  - Provide timely, c for possible contributors to thought disturbance, including medications, impaired vision or hearing, underlying metabolic abnormalities, dehydration, psychiatric diagnoses, and notify attending MD/LIP  - [place] high risk fall precautions, as indicated redness and/or skin breakdown  - Initiate interventions, skin care algorithm/standards of care as needed  Outcome: Adequate for Discharge     Problem: SAFETY ADULT - FALL  Goal: Free from fall injury  Description  INTERVENTIONS:  - Assess pt frequently for

## 2020-01-30 NOTE — PROGRESS NOTES
[facility]  Gastroenterology Progress Note    Sandy You Patient Status:  Inpatient    1950 MRN [id number]   Location [facility] 5SW/SE Attending Nicolle Landeros MD   Hosp Day # 2 PCP David Ellison MD     Subjective:  Sally Tilley 1/28/2020 at 9:47     Approved by (CST):  Cordelia De Guzman MD on 1/28/2020 at 9:49            Problem list:  Patient Active Problem List:     Allergic rhinitis     Chronic midline low back pain without sciatica     Insomnia     Non-traumatic compression fr

## 2020-01-30 NOTE — PROGRESS NOTES
[facility] Hospitalist Progress Note                                                                   Jalil Galaviz  12/12/1950    Subjective:  Feels okay,   Denies chest pain no SOB       Meds:   Scheduled:   • ri 8.2*   MG  --  2.1  --   --  2.2   PHOS  --   --  1.3*  --  2.0*   * 84 98  --  91  91       Recent Labs   Lab 01/27/20  1658 01/28/20  1540 01/29/20  0731   ALT 15  --   --    AST 36  --   --    ALB 2.8* 2.5* 2.2*       Recent Labs   Lab 01/27/20

## 2020-01-30 NOTE — PROGRESS NOTES
[facility] [place] [facility]    [facility] Note    Gray Caban Patient Status:  Inpatient    1950 MRN [id number]   Location [facility] 5SW/SE Attending Ar Christina MD   Hosp Day # 3 PCP Alexis Peter MD       Subjective:     Having Sanam Vasquez Gorge Agee MD  1/29/2020    Assessment and Plan:   Patient is a 77year old female with PMh of PEREZ cirrhosis C/B ascities, HE, EV, Reccurrent UTI admitted with AMS:     Hypokalemia:  - likely related to diuretics, lactulose tx.  And ho

## 2020-02-14 PROBLEM — J02.9 PHARYNGITIS, UNSPECIFIED ETIOLOGY: Status: ACTIVE | Noted: 2020-01-01

## 2020-02-27 NOTE — ANESTHESIA PROCEDURE NOTES
Airway  Urgency: Elective    Airway not difficult    General Information and Staff    Patient location during procedure: OR  Anesthesiologist: Boni Leon DO  Performed: anesthesiologist     Indications and Patient Condition  Indications for airway varinder

## 2020-02-27 NOTE — ANESTHESIA PREPROCEDURE EVALUATION
Anesthesia PreOp Note    HPI:     Yusef Potts is a 77year old female who presents for preoperative consultation requested by: * No surgeons listed *    Date of Surgery: 2/27/2020    * No procedures listed *  Indication: * No pre-op diagnosis entered * (nonalcoholic steatohepatitis) ([facility] [address]         Date Noted: 11/21/2018      Anemia         Date Noted: 09/19/2018      Hyperglycemia         Date Noted: 09/19/2018      Other ascites         Date Noted: 09/19/2018      Recurrent UTI      Non-traumatic moreno Pain., Disp: 90 tablet, Rfl: 0, 2/26/2020 at Unknown time  GABAPENTIN 300 MG Oral Cap, TAKE 1 CAPSULE(300 MG) BY MOUTH EVERY NIGHT, Disp: 30 capsule, Rfl: 0, Taking  spironolactone 50 MG Oral Tab, Take 1 tablet (50 mg total) by mouth 2 (two) times daily. , Inability: Not on file      Transportation needs:        Medical: Not on file        Non-medical: Not on file    Tobacco Use      Smoking status: Former Smoker        Packs/day: 1.00        Years: 20.00        Pack years: 21        Quit date: 5/23/1950 (134 lb 14.7 oz). Her blood pressure is 133/75 and her pulse is 67.  Her respiration is 14 and oxygen saturation is 99%.    02/26/20  1228 02/27/20  1408   BP:  133/75   Pulse:  67   Resp:  14   SpO2:  99%   Weight: 61.2 kg (134 lb 14.7 oz)    Height: 1.702

## 2020-02-27 NOTE — ANESTHESIA POSTPROCEDURE EVALUATION
Patient: Bob Cortes    Procedure Summary     Date:  02/27/20 Room / Location:  [facility]    Anesthesia Start:  7318 Anesthesia Stop:  3929    Procedure:  IR KYPHOPLASTY Diagnosis:       Compression fracture of body of thor

## 2020-02-28 NOTE — PROGRESS NOTES
Patient discharged home with discharge instructions. Verbalized understanding. No new drainage on bandage.

## 2020-03-04 PROBLEM — S22.050S COMPRESSION FRACTURE OF T6 VERTEBRA, SEQUELA: Status: ACTIVE | Noted: 2019-01-01

## 2020-03-04 PROBLEM — S22.000A COMPRESSION FRACTURE OF BODY OF THORACIC VERTEBRA (HCC): Status: ACTIVE | Noted: 2019-01-01

## 2020-04-07 PROBLEM — M54.9 CHRONIC BILATERAL BACK PAIN, UNSPECIFIED BACK LOCATION: Status: ACTIVE | Noted: 2019-01-01

## 2020-04-07 PROBLEM — G89.29 CHRONIC BILATERAL BACK PAIN, UNSPECIFIED BACK LOCATION: Status: ACTIVE | Noted: 2019-01-01

## 2020-04-07 PROBLEM — S22.080G: Status: ACTIVE | Noted: 2019-01-01

## 2020-04-20 PROBLEM — D64.9 ANEMIA, UNSPECIFIED TYPE: Status: ACTIVE | Noted: 2020-01-01

## 2020-04-20 PROBLEM — K72.90 HEPATIC ENCEPHALOPATHY (HCC): Status: ACTIVE | Noted: 2020-01-01

## 2020-04-20 NOTE — ED PROVIDER NOTES
Patient Seen in: [facility] AND [facility] Emergency Department      History   Patient presents with:  Abdomen/Flank Pain    Stated Complaint: ab pain     HPI  71-year-old female with liver cirrhosis and ascites secondary to PEREZ, presenting for evaluation of ab • HYSTERECTOMY     • SMALL BOWEL OBSTRUCTION RELEASE N/A 9/19/2018    Performed by Ritta Dakins, MD at [facility] MAIN OR                    Social History    Tobacco Use      Smoking status: Former Smoker        Packs/day: 1.00        Years: 20.00        Pack BASIC METABOLIC PANEL (8) - Abnormal; Notable for the following components:       Result Value    Glucose 132 (*)     Sodium 132 (*)     Chloride 96 (*)     All other components within normal limits   HEPATIC FUNCTION PANEL (7) - Abnormal; Notable for th with portal hypertension. The liver has a shrunken and nodular morphology. There is a large volume of abdominal ascites. Gastroesophageal and periumbilical varices are present.  2.  Chronic compression deformities throughout the spine with kyphoplasty ch List                       Present on Admission  Date Reviewed: 4/15/2020          ICD-10-CM Noted POA    Hepatic encephalopathy ([facility] [address] K72.90 4/20/2020 Unknown

## 2020-04-20 NOTE — ED INITIAL ASSESSMENT (HPI)
PATIENT AOX3 TO ED VIA EMS PATIENT CO OF GENERALIZED ABD PAIN X FEW DAYS, +ABD DISTENSION NOTED. HX OF CIRRHOSIS. DENIES ETOH. PATIENT STATES LAST PARACENTESIS X November 19. DENIES N/V DENIES FEVER/DIARRHEA.  PAIN 8/10

## 2020-04-21 NOTE — IMAGING NOTE
0930 Pt to ultrasound room scouts taken by [facility],[address], [address]    0932 Hx taken procedure explained questions answered     [id number] Patient consented prior to [address] room arrival     Pt to get albumin 25% 25 grams yes     [address], APN  Here scanning completed .

## 2020-04-21 NOTE — H&P (VIEW-ONLY)
[facility]    Report of Consultation    Dyllan Mack Patient Status:  Inpatient    1950 MRN [id number]   Location [facility] 5SW/SE Attending Maddy Leiva MD   [address] Day # 1 PCP Bishop Dina MD     Date of Admission 10/26/2018    Performed by Jim Cabral MD at [address] [place] [address] ENDOSCOPY   • [address] [place] [address]     • HERNIA SURGERY  96/30/5665    umbilical hernia   • HERNIA UMBILICAL REPAIR ADULT N/A 9/19/2018    Performed by Leilani Noble MD at  capsule (300 mg total) by mouth nightly. Montelukast Sodium 10 MG Oral Tab, Take 10 mg by mouth daily. Potassium Chloride ER 10 MEQ Oral Tab CR, Take 1 tablet (10 mEq total) by mouth 2 (two) times daily.   spironolactone 50 MG Oral Tab, Take 1 tablet (50 bruising  ENDOCRINE: no temperature intolerance    Physical Exam:   Blood pressure 124/65, pulse 83, temperature 97.8 °F (36.6 °C), temperature source Oral, resp. rate 18, height 5' 7\" (1.702 m), weight 130 lb (59 kg), SpO2 96 %.   GENERAL in no apparent d exposure control for dose reduction was used. Adjustment of the mA and/or kV was done based on the patient's size. Use of iterative reconstruction technique for dose reduction was used.   Dose information is transmitted to the [facility] ([address],First [address] umbilicus. There is a 16 mm diameter defect in the ventral left periumbilical aspect of the abdominal wall resulting in a shallow hernia which contains ascites and vascular structures without obvious inflammation.  URINARY BLADDER: Urinary bladder is diste on 4/20/2020 at 7:35 PM     Finalized by (CST): Otf Minor MD on 4/20/2020 at 7:44 PM             Impression:   PEREZ cirrhosis. Increasing ascites with abdominal discomfort, rule out SBP.     Chronic hepatic encephalopathy on twice daily lactulose at agus

## 2020-04-21 NOTE — HOME CARE LIAISON
Received referral from Tomeka Hung. Spoke with patient and her niece Stefany Covarrubias. Per nigonzalez Cordero, [facility].  Kirby Covarrubias states that hospice was revoked for patient to come into hospital, however she b

## 2020-04-21 NOTE — PLAN OF CARE
Patient went to IR for paracentesis this morning. Per IR nurse report, nearly 5L of fluid was drawn off the patient's abdomen. Patient's abdomen is less distended and she is able to sit up higher in the bed. Spoke to CHOBOLABS, patient's niece.  Per Marco Antonio Roblero needed  - Optimize oral hygiene and moisture  - Encourage food from home; allow for food preferences  - Enhance eating environment  Outcome: Progressing     Problem: METABOLIC/FLUID AND ELECTROLYTES - ADULT  Goal: Electrolytes maintained within normal limi

## 2020-04-21 NOTE — CONSULTS
[facility]    Report of Consultation    Lee Ann Ferris Patient Status:  Inpatient    1950 MRN [id number]   Location [facility] 5SW/SE Attending Taryn Escobedo MD   [facility] Day # 1 PCP Cici Philippe MD     Date of Admission 10/26/2018    Performed by Juan Pablo Banegas MD at [facility] ENDOSCOPY   • [address] [place] [address]     • HERNIA SURGERY  27/20/6274    umbilical hernia   • HERNIA UMBILICAL REPAIR ADULT N/A 9/19/2018    Performed by Jessica Flynn MD at  capsule (300 mg total) by mouth nightly. Montelukast Sodium 10 MG Oral Tab, Take 10 mg by mouth daily. Potassium Chloride ER 10 MEQ Oral Tab CR, Take 1 tablet (10 mEq total) by mouth 2 (two) times daily.   spironolactone 50 MG Oral Tab, Take 1 tablet (50 bruising  ENDOCRINE: no temperature intolerance    Physical Exam:   Blood pressure 124/65, pulse 83, temperature 97.8 °F (36.6 °C), temperature source Oral, resp. rate 18, height 5' 7\" (1.702 m), weight 130 lb (59 kg), SpO2 96 %.   GENERAL in no apparent d exposure control for dose reduction was used. Adjustment of the mA and/or kV was done based on the patient's size. Use of iterative reconstruction technique for dose reduction was used.   Dose information is transmitted to the [facility] ([address],First Floor umbilicus. There is a 16 mm diameter defect in the ventral left periumbilical aspect of the abdominal wall resulting in a shallow hernia which contains ascites and vascular structures without obvious inflammation.  URINARY BLADDER: Urinary bladder is diste on 4/20/2020 at 7:35 PM     Finalized by (CST): Coreen Magaña MD on 4/20/2020 at 7:44 PM             Impression:   PEREZ cirrhosis. Increasing ascites with abdominal discomfort, rule out SBP.     Chronic hepatic encephalopathy on twice daily lactulose at agus

## 2020-04-21 NOTE — CM/SW NOTE
SW self referral for d/c planning. Pt admitted 4/20/20 d/t abdominal pain. Per chart review and multidisciplinary rounds,  Pt lives in a ranch style home (3 external stairs) with her sister.  Pt niece, BODØ reports pt recently was admitted to [facility]

## 2020-04-21 NOTE — PLAN OF CARE
Problem: Patient Centered Care  Goal: Patient preferences are identified and integrated in the patient's plan of care  Description  Interventions:  - What would you like us to know as we care for you?  \"I was having a lot of stomach pain\"  - Provide mauricio Obtain nutritional consult as needed  - Optimize oral hygiene and moisture  - Encourage food from home; allow for food preferences  - Enhance eating environment  Outcome: Not Progressing     Problem: MUSCULOSKELETAL - ADULT  Goal: Return mobility to safest (undernourished)  Description  INTERVENTIONS:  - Monitor percentage of each meal consumed  - Identify factors contributing to decreased intake, treat as appropriate  - Assist with meals as needed  - Monitor I&O, WT and lab values  - Obtain nutritional cons

## 2020-04-21 NOTE — ED NOTES
Orders for admission, patient is aware of plan for admission and U/S guided paracentesis in the morning and ready to go upstairs. The patient is alert and oriented and is ambulatory with assist of one.  Any questions, please call ED RN Bill  at extension [phone]

## 2020-04-21 NOTE — H&P
[facility] HOSPITALIST HISTORY AND PHYSICAL     CC: Patient presents with:  Abdomen/Flank Pain       PCP: Cici Philippe MD    History of Present Illness:   Patient is a 77year old female with PMH sig for EPREZ cirrhosis w/ hx esophageal varices, ascites, and Packs/day: 1.00        Years: 20.00        Pack years: 21        Quit date: 1950        Years since quittin.9      Smokeless tobacco: Never Used    Alcohol use: No      Alcohol/week: 0.0 standard drinks       Fam Hx  Family History   Problem Rela [address] (BRD=[id number]), 1/28/2020, 1:57 PM.  INDICATIONS: Diffuse abdominal pain- hx cirrhosis and ascites.   TECHNIQUE: CT images of the abdomen and pelvis were obtained with non-ionic intravenous contrast materia normal limits. There is descending and sigmoid colonic diverticulosis. There is no free air, organized measurable fluid, or lymphadenopathy in the abdomen or pelvis.  ABDOMINAL WALL: There is body wall edema mainly along the ventral aspect of the abdomen ce pelvic floor compatible with mild prolapse. 6.  Small hiatal hernia. 7.  Colonic diverticulosis. 8.  Post hysterectomy. No adnexal mass. 9.  Coronary atherosclerosis. 10.  Diffuse fatty replacement of the pancreas.  11.  Left renal cyst.   Dictated by (CST

## 2020-04-22 NOTE — PLAN OF CARE
Problem: Patient Centered Care  Goal: Patient preferences are identified and integrated in the patient's plan of care  Description  Interventions:  - What would you like us to know as we care for you?  \"I was having a lot of stomach pain\"  - Provide mauricio Obtain PT/OT consults as needed  - Advance activity as appropriate  - Communicate ordered activity level and limitations with patient/family  Outcome: Progressing     Patient admitted for abdominal distention. Hemoglobin 6.7 today.  1 unit PRBC given, hgb a

## 2020-04-22 NOTE — DIETARY NOTE
ADULT NUTRITION INITIAL ASSESSMENT    Pt is at high nutrition risk. Pt meets severe malnutrition criteria.       CRITERIA FOR MALNUTRITION DIAGNOSIS:  Criteria for severe malnutrition diagnosis: chronic illness related to body fat severe depletion, muscle BMI: Body mass index is 19.89 kg/m².   BMI CLASSIFICATION: 19-24.9 kg/m2 - WNL   IBW: 135 lbs       94 % IBW  Usual Body Wt: 189 lbs ~ 15 months ago       68% UBW  WEIGHT HISTORY:  Patient Weight(s) for the past 336 hrs:   Weight   04/21/20 1206 57.6 kg ( region and calf region per visual exam.  - Fluid Accumulation: ascites and lower extremity edema per visual exam  - Skin Integrity: intact and RN documentation reviewed.     NUTRITION PRESCRIPTION:  Diet: 2 gm Na  Oral Supplements: as above  ESTIMATED NUTRI

## 2020-04-22 NOTE — PROGRESS NOTES
[facility] Hospitalist Progress Note     PCP: Meagan Hurst MD    Chief Complaint: follow-up    SUBJECTIVE:  Hg 6.7- made NPO but had breakfast, pt denies bloody stools or any BM x days (did have BM here)    OBJECTIVE:  Temp:  [97.8 °F (36.6 °C)-98.3 °F (36 Fluticasone Propionate  2 spray Each Nare Daily   • gabapentin  100 mg Oral TID   • gabapentin  300 mg Oral Nightly   • lidocaine-menthol  2 patch Transdermal Daily   • Montelukast Sodium  10 mg Oral Daily   • furosemide  40 mg Oral Daily   • spironolacton mail left for  to update.         MD Colin De La Torre  [phone]  4/22/2020  9:58 AM

## 2020-04-22 NOTE — PHYSICAL THERAPY NOTE
Chart reviewed, pt with anemia, Hg 6.7 this AM. Discussed case with RN, MD aware and pt likely to have blood transfusion. Per protocol, not appropriate for PT eval d/t acute anemia with Hg <7.0. Will f/u later today as appropriate, schedule permitting.

## 2020-04-22 NOTE — PLAN OF CARE
Problem: Patient Centered Care  Goal: Patient preferences are identified and integrated in the patient's plan of care  Description  Interventions:  - What would you like us to know as we care for you?  \"I was having a lot of stomach pain\"  - Provide mauricio Obtain PT/OT consults as needed  - Advance activity as appropriate  - Communicate ordered activity level and limitations with patient/family  Outcome: Progressing     Problem: Patient/Family Goals  Goal: Patient/Family Long Term Goal  Description  Patient'

## 2020-04-22 NOTE — OCCUPATIONAL THERAPY NOTE
Orders received, chart review complete, per EMR pt with HGB of 6.7 this morning, pt may have blood transfusion this today. Will hold therapy until HGB is at least 7.0 and as schedule permits - RN aware.      Nenita Rizo OTR/L  [facility]

## 2020-04-22 NOTE — CM/SW NOTE
04/22/20 1400   CM/SW Referral Data   Referral Source Nurse;Social Work (self-referral);    Reason for Referral Discharge planning   Patient Info   Patient's Mental Status Alert;Oriented   Patient's [address]   Number of Levels i and/or discharge planning. Maria Berg.  Tasha Forte RN, BSN  Nurse   [phone]

## 2020-04-22 NOTE — PROGRESS NOTES
[facility] AND [facility]  Gastroenterology Progress Note    Armando Lazcano Patient Status:  Inpatient    1950 MRN [id number]   Location [facility] 5SW/SE Attending José Garcia MD   [facility] Day # 2 PCP Meredith Reid MD     Subjective:  Brielle Collado otherwise chronic since 8/20/19 imaging. 3.  Mild body wall edema mainly along the ventral aspect of the abdominal wall. Small left periumbilical hernia containing fat, vascular structures, and ascites without acute inflammation. 4.  Post cholecystectomy. with increasing ascites. Status post large-volume paracentesis without evidence of SBP. Encephalopathy controlled on lactulose. Can increase the dose because of her constipation. Hemoglobin drifting downward to 6.7 and packed blood cells ordered.   Zakiya

## 2020-04-22 NOTE — PROGRESS NOTES
S: Patient asked for healing prayer. O: Patient was in bed and resting. A:  provided prayer for patient. P: Follow up with on-going spiritual care.        04/22/20 1034   Clinical Encounter Type   Visited With Patient   Routine Visit Introduction

## 2020-04-23 NOTE — OPERATIVE REPORT
ESOPHAGOGASTRODUODENOSCOPY REPORT    Patient Name:  Negin Tinoco Record #: [id number]  YOB: 1950  Date of Procedure: 4/23/2020    Referring physician: Halina Marshall MD    Surgeon:  Micki Shaw. Ac Sheth MD    Pre-op diagnosis:  An

## 2020-04-23 NOTE — PLAN OF CARE
Problem: Patient Centered Care  Goal: Patient preferences are identified and integrated in the patient's plan of care  Description  Interventions:  - What would you like us to know as we care for you?  \"I was having a lot of stomach pain\"  - Provide mauricio shaving  - Use soft bristle tooth brush  - Limit straining and forceful nose blowing  Outcome: Progressing     Problem: GASTROINTESTINAL - ADULT  Goal: Maintains adequate nutritional intake (undernourished)  Description  INTERVENTIONS:  - Monitor percentag

## 2020-04-23 NOTE — PHYSICAL THERAPY NOTE
PHYSICAL THERAPY EVALUATION - INPATIENT     Room Number: [facility] ENDO POOL ROOM/[facility] ENDO POOL ROOM  Evaluation Date: 4/23/2020  Type of Evaluation: Initial   Physician Order: PT Eval and Treat    Presenting Problem: hepatic encephalopathy, anemia  Reason for son's house. PT recommendation home with 24 hr supervision, HHPT/OT at d/c. Per SW note, pt declined HHC, to resume home hospice. Patient will benefit from continued IP PT services to address these deficits in preparation for discharge.     DISCHARGE REC basic ADLs sometimes using straight cane and rolling walker for mobility. Reports she did not use a device at her sister's house as \"the walls are close together\".  Has assist from sister for IADLs and driving however per chart, sister no longer able to a commode, etc.): A Little   -   Moving from lying on back to sitting on the side of the bed?: A Little   How much help from another person does the patient currently need. ..   -   Moving to and from a bed to a chair (including a wheelchair)?: A Little   - preparation for discharge.    Goal #5   Current Status    Goal #6    Goal #6  Current Status

## 2020-04-23 NOTE — INTERVAL H&P NOTE
Pre-op Diagnosis: anemia    The above referenced H&P was reviewed by Tim Morris MD on 4/23/2020, the patient was examined and no significant changes have occurred in the patient's condition since the H&P was performed.   I discussed with the patient a

## 2020-04-23 NOTE — CM/SW NOTE
MDO to SW for d/c planning. SW attempted to reach pt's sister Elzbieta Gave, (non answer, no vm). SW spoke to pt;s dtr Queenie Greenberg ([phone]) to discuss d/c palning.   Dtr states that pt's sister is having her own health issues and is therefore unable to care

## 2020-04-23 NOTE — ANESTHESIA POSTPROCEDURE EVALUATION
Patient: Richi Almonte    Procedure Summary     Date:  04/23/20 Room / Location:  [address] [place] [address] ENDOSCOPY 05 / [address] [place] [address] ENDOSCOPY    Anesthesia Start:  0486 Anesthesia Stop:  4718    Procedure:  ESOPHAGOGASTRODUODENOSCOPY (EGD) (N/A ) Diagnosis:       Anemia, unspecifie

## 2020-04-23 NOTE — ANESTHESIA PREPROCEDURE EVALUATION
Anesthesia PreOp Note    HPI:     Silverio Boothe is a 77year old female who presents for preoperative consultation requested by: Bossman Mock MD    Date of Surgery: 4/20/2020 - 4/23/2020    Procedure(s):  ESOPHAGOGASTRODUODENOSCOPY (EGD)  Indication: Date Noted: 02/25/2019      Frequent urination         Date Noted: 12/04/2018      Dysuria         Date Noted: 12/04/2018      Essential hypertension         Date Noted: 11/21/2018      Liver cirrhosis secondary to PEREZ (nonalcoholic steatohepatitis) ([facility] (NEURONTIN) 300 MG Oral Cap, Take 1 capsule (300 mg total) by mouth nightly., Disp: 90 capsule, Rfl: 1, 4/20/2020 at Unknown time  Montelukast Sodium 10 MG Oral Tab, Take 10 mg by mouth daily. , Disp: , Rfl: , 4/20/2020 at Unknown time  Potassium Chloride E times daily.  Changed to TID ), Disp: 180 capsule, Rfl: 0      potassium chloride IVPB premix 20 mEq, 20 mEq, Intravenous, Once, Rupert Myers MD  Pantoprazole Sodium (PROTONIX) 40 mg in Sodium Chloride 0.9 % 10 mL IV push, 40 mg, Intravenous, Q24H, Missy Frost Sister 72        dx at age 72     Social History    Socioeconomic History      Marital status: Single      Spouse name: Not on file      Number of children: Not on file      Years of education: Not on file      Highest education level: Not on file    Occup 03/30/2020    MCH 33.3 04/23/2020    MCH 34.1 (H) 03/30/2020    MCHC 33.6 04/23/2020    MCHC 33.2 03/30/2020    RDW 19.0 (H) 04/23/2020    RDW 16.3 (H) 03/30/2020    .0 04/23/2020     03/30/2020     Lab Results   Component Value Date    NA 13 were answered to the best of my ability. The patient desires the anesthetic management as planned.   Ar Jerome  4/23/2020 9:55 AM

## 2020-04-23 NOTE — OCCUPATIONAL THERAPY NOTE
OCCUPATIONAL THERAPY EVALUATION - INPATIENT     Room Number: 548/548-A  Evaluation Date: 4/23/2020  Type of Evaluation: Initial  Presenting Problem: (hepatic encephalopathy)    Physician Order: IP Consult to Occupational Therapy  Reason for Therapy: ADL/IA technique education       OCCUPATIONAL THERAPY MEDICAL/SOCIAL HISTORY     Problem List  Principal Problem:    Hepatic encephalopathy ([facility]  Active Problems:    Other ascites    Anemia, unspecified type      Past Medical History  Past Medical History:   Norma Automatic  Patient Position: Lying    COGNITION  Pt had difficulty with word finding and providing historical information - may benefit from cog assessment     VISION  Wearing glasses     Communication: Receptive and expressive language intact    [employer] [facility] addressed; Alarm set    OT Goals  Patients self stated goal is: does not state      Patient will complete functional transfer with SPV  Comment:     Patient will complete toileting with SPV   Comment:     Patient will tolerate standing for 2-3 minutes in pr

## 2020-04-23 NOTE — PLAN OF CARE
Problem: Patient Centered Care  Goal: Patient preferences are identified and integrated in the patient's plan of care  Description  Interventions:  - What would you like us to know as we care for you?  \"I was having a lot of stomach pain\"  - Provide mauricio Obtain PT/OT consults as needed  - Advance activity as appropriate  - Communicate ordered activity level and limitations with patient/family  Outcome: Progressing     Problem: HEMATOLOGIC - ADULT  Goal: Maintains hematologic stability  Description  INTERVE within reach. Will continue to monitor for change.

## 2020-04-23 NOTE — PROGRESS NOTES
[facility] Hospitalist Progress Note     CC: Hospital Follow up    PCP: Miguel Lopez MD       Assessment/Plan:     Principal Problem:    Hepatic encephalopathy ([address]  Active Problems:    Other ascites    Anemia, unspecified type  77year old female with PMH 16, height 5' 7\" (1.702 m), weight 127 lb (57.6 kg), SpO2 98 %.     Temp:  [97.8 °F (36.6 °C)-98 °F (36.7 °C)] 97.8 °F (36.6 °C)  Pulse:  [81-85] 81  Resp:  [12-18] 16  BP: (101-141)/(53-78) 117/57      Intake/Output:    Intake/Output Summary (Last 24 hour Finalized by (CST): Bob Menendez on 4/21/2020 at 1:22 PM          Ct Abdomen Pelvis Iv Contrast, No Oral (er)    Result Date: 4/20/2020  CONCLUSION:  1. There are findings compatible with hepatic cirrhosis with portal hypertension.   The liver has

## 2020-04-24 NOTE — PHYSICAL THERAPY NOTE
PHYSICAL THERAPY TREATMENT NOTE - INPATIENT     Room Number: 548/548-A       Presenting Problem: hepatic encephalopathy, anemia    Problem List  Principal Problem:    Hepatic encephalopathy (HCC)  Active Problems:    Other ascites    Anemia, unspecified ty Yvonne's aunt. \"    OBJECTIVE  Precautions: Bed/chair alarm    WEIGHT BEARING RESTRICTION  Weight Bearing Restriction: None    PAIN ASSESSMENT   Rating: (no c/o pain)  Location: N/A  Management Techniques: Repositioning;Relaxation; Activity promotion    B to be able to walk and get stronger    Goal #1 Patient is able to demonstrate supine - sit EOB @ level: modified independent      Goal #1   Current Status Pt performs bed mobility via log roll at A    Goal #2 Patient is able to demonstrate transfers Sit

## 2020-04-24 NOTE — DISCHARGE SUMMARY
General Medicine Discharge Summary     Patient ID:  Lee Ann Ferris  77year old  12/12/1950    Admit date: 4/20/2020    Discharge date and time: 4/24/2020    Attending Physician: Pedro Robbins MD     Consults: IP CONSULT TO HOSPITALIST  IP CONSULT TO Sophie Denise 77year old female with PMH sig for PEREZ cirrhosis w/ hx esophageal varices, ascites, and hep encephalopathy a/w abd pain, inc weakness and confusion, due to HE improved with lactulose, as well as anemia, improved with tx, EGD neg for active bleeding, also Take 1 capsule (100 mg total) by mouth 2 (two) times daily. What changed:  additional instructions     traMADol HCl 50 MG Tabs  Commonly known as:  Ultram  Take 1 tablet (50 mg total) by mouth every 8 (eight) hours as needed for Pain.   What changed:    · Total Time Coordinating Care: Greater than 30 minutes    Patient had opportunity to ask questions and state understand and agree with therapeutic plan as outlined    Thank Andree Malave M.D.  [facility] Hospitalist  Pager

## 2020-04-24 NOTE — DISCHARGE PLANNING
Patient was discharged, instructions were given, verbalized understanding. Iv was removed by RN. Patient's VSS, in no apparent distress. Patient had all belongingness with. Patient was taken by [facility], and picked up by Jemima Bynum (son).

## 2020-04-24 NOTE — PROGRESS NOTES
[facility] AND [facility]  Gastroenterology Progress Note    Monica Bernstein Patient Status:  Inpatient    1950 MRN [id number]   Location [facility] 5SW/SE Attending Alexander Sol MD   [facility] Day # 4 PCP Laurita Ignacio MD     Subjective:  Ajit Thurston cirrhosis with increasing ascites. Status post large-volume paracentesis without evidence of SBP. Diuretics resumed. Encephalopathy controlled on lactulose. Was constipated, now reports diarrhea.   Titrate lactulose with goal of 2 BMs per day without diar

## 2020-04-24 NOTE — OCCUPATIONAL THERAPY NOTE
OCCUPATIONAL THERAPY TREATMENT NOTE - INPATIENT        Room Number: 548/548-A           Presenting Problem: (hepatic encephalopathy)    Problem List  Principal Problem:    Hepatic encephalopathy ([address]  Active Problems:    Other ascites    Anemia, unspecifi drying)?: A Little  -   Toileting, which includes using toilet, bedpan or urinal? : A Little  -   Putting on and taking off regular upper body clothing?: A Little  -   Taking care of personal grooming such as brushing teeth?: A Little  -   Eating meals?: A

## 2020-05-08 PROBLEM — L98.9 SKIN LESION: Status: ACTIVE | Noted: 2019-05-31

## 2020-05-29 NOTE — H&P
[facility] Patient Status:  Outpatient in a Bed    1950 MRN [id number]   Location [facility] Attending Sudarshan García, [facility] Day # 0 PCP Tricia Ledbetter Breast Cancer Sister 62        dx at age 63   • Breast Cancer Sister 71        dx at age 72       Allergies/Medications:    Allergies:    Advil [Ibuprofen]       BLEEDING  Ambien [Zolpidem]       CONFUSION    Medications:    Current Outpatient Medications: RDW, NEPRELIM, WBC, PLT in the last 168 hours. No results for input(s): PTP, INR, PTT in the last 168 hours. No results for input(s): GLU, BUN, CREATSERUM, GFRAA, GFRNAA, CA, NA, K, CL, CO2 in the last 168 hours.     Assessment/Plan:   Impression: Cirrhos

## 2020-05-29 NOTE — IVS NOTE
Patient o2sat on RA at rest 95% on room air. Patient in IR for Abdominal PleurX drain insertion, approximately 4L removed. Patient monitored post procedure, VSS during procedure, Patient tolerated procedure well.   Spoke to patient's daughter and discu

## 2020-05-29 NOTE — PROCEDURES
[facility]  Procedure Note    Demian Ham Patient Status:  Outpatient in a Bed    1950 MRN [id number]   Location [facility] Attending Ace Herman, [address] [facility] Day # 0 PCP Darnell Bosworth, MD

## 2020-05-29 NOTE — PRE-SEDATION ASSESSMENT
[facility]  IR Pre-Procedure Sedation Assessment    History of snoring or sleep or apnea?    No    History of previous problems with anesthesia or sedation  No    Physical Findings:  Neck: nl ROM  CV: RRR  PULM: normal respiratory rate/effor

## 2021-06-08 NOTE — BRIEF PROCEDURE NOTE
[facility]  Hospitalist Discharge Summary     Patient: Phuong Singh Admitting Physician: Rick Joya MD   YOB: 1987 Discharge Physician: Robert Bianchi MD   MRN: [id number]    Admission Date: 6/6/2021     Discharge date: 6/8/2021      Indication for Admission:  Ureterolithiasis [N20.1]  Nausea and vomiting in adult [R11.2]  Flank pain, acute [R10.9]  Calculus of ureter [N20.1]    Discharge Diagnoses:   Left-sided obstructive kidney stones x2 in proximal left ureter  Moderate hydro nephrosis/hydroureter  Acute kidney injury - resolved  Pain, nausea, vomiting - 2/2 the above, resolved      Hospital Course:   HPI  Phuong Singh is a 33 year old female that presents “with a hx/o kidney stones, nephrostomy, and several stents who presents to the ED for evaluation of L flank pain that began this morning. She woke up in pain today, reporting it feels like they are \"stuck together and blocking\". She reports she was sent home with pain medicine, nausea medicine, and Flomax when she was seen for similar sx 2 days ago. She states the nausea medication has not bene helping. She reports she has seen 2 urologists. She was told there is an area in the bottom of her L kidney where urine is collecting. She currently does not have a nephrostomy tube in. The patient also reports an episode of vomiting today. The patient denies fever. \"     Previous CT scan abdomen pelvis without contrast performed 6/4:  “IMPRESSION:   1. Obstructing stones within the proximal left ureter resulting in moderate  left hydronephrosis and hydroureter.  2. Bilateral nephrolithiasis.  3. Mild diverticulosis.”     In ED;  Temp 98.4 °F (36.9 °C)      Heart Rate 70      Resp 18      /69      SpO2 96 %      EtCO2 mmHg        Height        Weight 159 lb 9.8 oz (72.4 kg)      Weight Scale Used Scale in bed      ED exam with left-sided CVA tenderness.  Moderate distress.  Tearful.     WBC  [facility]  Procedure Note    Ailin Mccarthy Patient Status:  Inpatient    1950 MRN [id number]   Location [facility] Attending Rupert Myers MD   Hosp Day # 7 PCP Ewelina Taylor MD     Procedure: Tr 7.9, hemoglobin 13, platelets 218 K. sodium 143, potassium 3.4, chloride 105, carbon dioxide 28, glucose 114, BUN 13, creatinine 0.87.     UA shows trace ketones, specific gravity 1.021, negative occult blood, negative protein, negative nitrite, trace leukocyte esterase; 1-5 squamous epithelial cells, 3-5 RBCs, 6-10 WBCs, few bacteria.  Rapid COVID pending.     Patient had vomiting in the ED. Given Zofran 4 mg IV x1, Reglan 10 mg IV x1, ketorolac 15 mg IV x1, Benadryl 25 mg IV x1.  Also ordered normal saline 1 L bolus and Flomax 0.4 mg p.o.     Admit to general bed.      Hospital Course  Urology consulted, performed cystoscopy, left retrograde pyelogram, ureteroscopy laser with stent placement. Post-op patient did well with resolution of pain, nausea and able to tolerate diet. acute kidney injury resolved. Urine cx negative, abx stopped. Urology recommended outpatient follow in 4 weeks for cysto and stent removal    Follow Ups:  Follow up with PCP for hospital follow up, chronic disease management, and coordination of care  Follow up with urology for kidney stones, repeat cysto and stent removal    Consults:   urology    Discharge Exam:  Visit Vitals  /77 (BP Location: RUE - Right upper extremity, Patient Position: Semi-Galindo's)   Pulse 76   Temp 98.8 °F (37.1 °C) (Oral)   Resp 17   Ht 5' 4\" (1.626 m)   Wt 72.5 kg   LMP 06/04/2021   SpO2 97%   BMI 27.44 kg/m²     General - Patient is in no acute distress.  Patient is conversing freely in full sentences.   Head - Atraumatic, normocephalic  Eyes - Extra ocular muscles intact. Pupils, equal, round, and reactive to light. Sclerae are anicteric  ENT - No drainage from nasal mucosa or nares. Oropharyngeal mucous membranes are moist. Neck is soft and supple  LYMPHNODES - No cervical or supraclavicular lymphadenopathy   CV - Regular rate and rhythm without additional heartsounds.  Peripheral pulses are 2+ and symmetric. Cap refill <2 seconds.  PULM - CTAB w/o  wheezing, rhonchi or rales   ABDOMEN - Soft, non-tender and non-distended. Bowel sounds are normoactive. No guarding or rebound tenderness. No hepatosplenomegaly.  MUSCULOSKELETAL - Warm and well perfused. No cyanosis, clubbing, or edema.  SKIN - Warm, no rashes or lesions.  NEURO - CNs II-XII are grossly intact.  Strength, sensation, and coordination of RLE/RUE/LLE/LUEs are grossly intact.  PSYCH - Alert and oriented to person, place and time. Recent and remote memory intact.      Significant Diagnostic Studies:   See PACS/RADS    Discharge Medications:  See AVS     Disposition: Home    Advanced Directives/Goals of Care:  Patient is decisional: Yes  Power Of  designee/healthcare agent - Not activated:  Code Status: Full Resuscitation     DISCHARGE INSTRUCTIONS     See AVS.    Time spent on discharge was >35 minutes. Greater than 50% of this face-to-face visit was spent on counseling and/or coordination of care. Patient verbalized understanding and is in agreement with treatment plan.    Copy of note will be sent to PCP for discharge recommendations.    Robert Bianchi MD

## 2023-01-01 NOTE — PLAN OF CARE
Mom called and said that her child had been started on Famotidine 40mg 8/17/23 and mom said that she isn't better. She is not sleeping and not feeding well. Mom said that she is taking daily about 20 ounces of formula. She would like to know what to do.   Problem: Patient Centered Care  Goal: Patient preferences are identified and integrated in the patient's plan of care  Description  Interventions:  - What would you like us to know as we care for you?  \"I was having a lot of stomach pain\"  - Provide mauricio Obtain PT/OT consults as needed  - Advance activity as appropriate  - Communicate ordered activity level and limitations with patient/family  Outcome: Progressing     Problem: HEMATOLOGIC - ADULT  Goal: Maintains hematologic stability  Description  INTERVE Call light within reach. Will continue to monitor for change.

## 2023-04-12 NOTE — CM/SW NOTE
Discharge Summary   Patient ID:   Mohini Rubio   [id number]   69 year old   1953   Admit date: 4/10/2023   Discharge date: 4/12/2023   Admitting Physician: Gerber Liriano MD   Discharge Physician: Momcilo Durdevic, MD     Primary Diagnoses:   Principal Problem:    Anemia  Resolved Problems:    * No resolved hospital problems. *     Secondary Diagnoses:   Past Medical History:   Diagnosis Date   • Cataract    • Essential (primary) hypertension     on lisinopril to help protect kidney   • OA (osteoarthritis)    • PONV (postoperative nausea and vomiting)         Hospital Course By Problem List (see H&P for details of admission):     Please see Dr. Liriano's H&P for detail.     Acute blood loss anemia, improved  --Admitted on 4/10/2023 for SOB and fatigue, progressively worsening since 1/2023. Guaiac positive stool.   --GI consulted. Started on IV PPI. Will transition to PO pantoprazole BID.   --S/P 3 units PRBC in house.  --S/P EGD on 4/11/2023 with findings revealed large hiatal hernia without source of GI blood loss anemia identified definitively. Follow pathology results.   --S/P Colonoscopy on 4/12 without active bleeding sites.  --S/P CT enterography on 4/12/2023 without active bleeding site in small bowel.   --OP GI follow up. Consider capsule study as OP.   --Patient hemodynamically stable upon discharge.       Interstitial Lung Disease: Slow progression based on imaging and pulmonary clinic notes  --Patient needs OP pulmonary clinic visit and follow up. Over due in 2020.      Hypertension  --Resume home Lisinopril.      Consults   IP Consult Orders (From admission, onward)     Start     Ordered    04/10/23 1630  Inpatient consult to GI  ONE TIME        Provider:  Abimael Moscoso MD    04/10/23 1629               Procedures performed XR Chest AP or PA    Result Date: 4/10/2023  EXAM:  XR CHEST AP OR PA HISTORY:  sob COMPARISON:  2/17/2017. FINDINGS: Moderate size hiatal hernia appears increased from  SW received MDO to eval for home needs. Pt has been screened per chart review and during nursing rounds. Pt is from home w/ family, self care. RN anticipated no discharge needs at this time.  SW will await for further PT/OT recommendations and follow up as prior. Heart size within normal limits. Pulmonary vessels appear unremarkable. Questionable faint stringy density lateral right lung base. No dense consolidation, pleural effusion or pneumothorax. No acute or destructive osseous findings.     IMPRESSION: 1.  Questionable faint stringy density lateral right lung base. May reflect atelectasis versus mild infiltrate. No dense consolidation or effusion. 2. Moderate size hiatal hernia, increased from prior.     CT ABDOMEN PELVIS W CONTRAST w/ IV contrast only    Result Date: 4/10/2023  EXAM:  CT ABDOMEN PELVIS W CONTRAST HISTORY:  abd pain COMPARISONS: None. TECHNIQUE: CT scan of the abdomen and pelvis was performed after administration of intravenous contrast. Multiplanar reconstructions were performed by the technologist and images were sent to the PACS station for interpretation. FINDINGS: LUNG BASES: The visualized lung bases are clear. The heart size is normal. There is no pericardial effusion. Moderate to large sized hiatal hernia is noted. LIVER: The liver is normal in size. The CT attenuation is normal. The portal vein branches and hepatic veins show normal enhancement. A tiny subcentimeter hypodense lesion in the right lobe of liver, either a cyst or a hemangioma, incompletely characterized on this examination. Another 12 mm somewhat irregular hypodense lesions seen in the right lobe of the liver anteriorly and inferiorly, could represent a hemangioma, incompletely characterized on this examination. No other focal lesions are identified. Intrahepatic biliary radicals are not dilated. GALLBLADDER/CBD: The gallbladder is surgically absent. The common bile duct is prominent related to postcholecystectomy status. PANCREAS: The pancreas is normal in size and morphology. The enhancement pattern is within normal limits. Pancreatic duct is not dilated. No peripancreatic fat stranding or fluid collection. SPLEEN: The spleen is normal in size an enhancement pattern. No  focal lesions are identified. ADRENAL GLANDS: Both adrenal glands are normal in size and morphology. KIDNEYS: Left kidney is not visualized. Multiple surgical clips are seen in the left renal hilum region suggesting prior surgical removal. Right kidneys normal in size and nephrogram. No right renal calculus. No right-sided hydronephrosis or hydroureter. No mass or cyst is evident in the right kidney. Right perinephric fat is unremarkable. URINARY BLADDER: The urinary bladder is partially distended but grossly unremarkable. STOMACH AND DUODENUM: The stomach and duodenum are unremarkable. SMALL BOWEL AND MESENTERY: The small bowel loops are not dilated. There is no abnormal wall thickening. There are no significant air-fluid levels to suggest obstruction. The ileocecal junction appears normal. APPENDIX: The appendix is well-visualized and is normal in size, without evidence of abnormal enhancement or surrounding inflammatory changes to suggest acute appendicitis. LARGE BOWEL: Small amount of stool throughout the colon. Multiple diverticula are seen in the sigmoid region without evidence of acute diverticulitis. Rectum appears normal. No acute colonic inflammation. PELVIS: Uterus is normal in size for the patient's age. Left ovary is unremarkable. Right ovary not clearly identified. No adnexal mass. No pelvic adenopathy. RETROPERITONEUM: The retroperitoneum is unremarkable. No pathological adenopathy is identified. Mild atherosclerotic calcification of the abdominal aorta is noted without evidence of aneurysm formation. IVC is unremarkable. PERITONEUM: There is no free fluid or free air in the abdomen or pelvis. BONES AND SOFT TISSUES: There are age-related degenerative changes of the lumbar spine. The superficial soft tissues of the abdominal wall are unremarkable.     IMPRESSION: Left kidney surgically absent. No residual mass or fluid collection in the left renal fossa. Right kidney is unremarkable. Two  subcentimeter hypodense lesions are seen in the right lobe of liver, incompletely characterized, could represent cyst or hemangioma. Appendix appears normal. Mild sigmoid diverticula stenosis without evidence of acute diverticulitis. No acute colonic inflammation. No small bowel obstruction. No pathological adenopathy in the abdomen or pelvis. No free fluid or free air in the abdomen or pelvis. Moderate to large sized hiatal hernia is noted. A secure EPIC CHAT message was sent to PRINCE MACHADO on 4/10/2023 1:20 PM.      4/12/2023  Mohini Rubio     PROCEDURE PERFORMED:   Colonoscopy with snare polypectomy.      PRE-OP DIAGNOSIS/ INDICATION FOR PROCEDURE:   Iron-deficiency anemia with unrevealing Esophagogastroduodenoscopy       POST- OP DIAGNOSIS:   1. Ascending colon polyp sessile 9 mm removed by snare polypectomy  2. Sigmoid diverticulosis.   3. Internal hemorrhoids     Surgeon(s): Abimael Moscoso MD  Phone Number: [phone]                       Surgeon(s) and Role:     * Abimael Moscoso MD - Primary    Assistant(s): N/A     Assistant Tasks: N/A                                   Complications: None     Specimens Removed:   ID Type Source Tests Collected by Time   A : Colon, Polyp Polyp Large Intestine, Right/Ascending Colon SURGICAL PATHOLOGY Abimael Moscoso MD 4/12/2023 0740         Estimated Blood Loss: N/A     Implants:   * No implants in log *     Anesthesia Type:  Monitor Anesthesia Care     DESCRIPTION OF PROCEDURE:   After risks, benefits and alternatives of procedure, informed consent was obtained. Digital rectal exam was performed and did not reveal any significant abnormalities. The pediatric colonoscope was introduced to the anus and advanced through a good prep to the cecum. The prep was complete ([place] Bowel Preperation scale is greater than or equal to 6 with no individual segment score less than 2). The cecum was identified by ileocecal valve and appendiceal orifice. Instrument was  withdrawn as the mucosa was examined. Withdrawal time was 9 minutes.        FINDINGS:   Terminal Ileum was intubated and was normal without bleeding stigmata.  Ascending colon polyp sessile 9 mm removed by snare polypectomy. Cecum, transverse, descending and sigmoid colon were normal. There were scattered sigmoid diverticula. Retroflexion in the rectum revealed internal hemorrhoids. Scope was withdrawn, procedure was terminated.      ENDOSCOPIC IMPRESSION:   1. Colon polyp removed as above  2. Sigmoid diverticulosis   3. Internal hemorrhoids     RECOMMENDATION:   1. Follow up pathology.   2. Soluble fiber supplements, avoid constipation.  Iron supplementation.  Monitor CBC.  Capsule endoscopy as outpatient  3. If the capsule is negative consider surgical evaluation for hiatal hernia repair.    DATE OF PROCEDURE:  4/11/2023      PREOPERATIVE DIAGNOSIS:  Suspected GI blood loss anemia     POST-PROCEDURE DIAGNOSIS:  Large 5 cm hiatal hernia with Veto erosions     PROCEDURE:  Esophagogastroduodenoscopy with biopsy     Surgeon:  Antoni Chavez MD  Assistants:  None  Assistant tasks: None  INDICATIONS FOR PROCEDURE:  69 year old female referred for esophagogastroduodenoscopy for suspected GI blood loss anemia     MEDICATIONS:  Monitored anesthesia care given in titrated and incremental fashion by the RN directly supervised by the Physician.       DESCRIPTION OF PROCEDURE:  Procedure was explained, risks, rationale, and alternatives discussed, patient's questions answered and informed consent obtained before sedation.  Pre-Procedure evaluation included cardiopulmonary auscultation and was believed stable for sedation and endoscopy.  The patient was placed in left lateral position as pulse oximeter, blood pressure and telemetry monitoring were stable.  The Olympus diagnostic adult video gastroscope was introduced into the oropharynx and the esophagus intubated.      Findings:  Esophagus:  Unremarkable the upper  midesophagus.  Distal esophagus is notable for a large hiatal hernia.  Without evidence of esophagitis or Sanon's esophagus.  Stomach:  Veto erosions are noted.  Retroflexion in the stomach confirmed the presence of a hiatal hernia.  Duodenum:  Normal to the 2nd portion.   Then, the gastric cavity was decompressed and the endoscope removed.  The patient appeared to tolerate the procedure well without complication evident and was taken to recovery satisfactorily.      PHOTOGRAPH:  Yes     Specimens removed:  ID Type Source Tests Collected by Time   A :  Tissue Stomach SURGICAL PATHOLOGY Antoni Chavez MD 4/11/2023 0952       IMPLANT:  * No implants in log *     ESTIMATED BLOOD LOSS:  None     Complications:  No     ASSESSMENT:   1. Successful upper endoscopy with findings as noted above.     PLAN:  1. Usual post-procedural monitoring.  2. Proceed with colonoscopy tomorrow     Discharge Exam   Blood pressure 122/67, pulse 75, temperature 98.9 °F (37.2 °C), temperature source Oral, resp. rate 16, height 5' 6\" (1.676 m), weight 97.6 kg (215 lb 2.7 oz), SpO2 96 %.   General: Looks well well developed, well nourished and no apparent distress  CV: regular rate and rhythm and no murmurs, rubs, or thrills  Resp: clear to auscultation bilaterally  Abd: soft, nontender, nondistended and no hepatosplenomegaly  Ext: no clubbing, cyanosis, or ischemia  Skin: no rashes, lesions, or ulcers noted  Neuro: CN 2-12 grossly intact, normal sensation  and no focal deficits noted  Psych: normal judgement and insight, oriented to time, place and person and normal mood and affect    Activity: As Tolerated    Diet: Advance as tolerated.     Code Status: Full Resuscitation     Pending issues to be followed up by PCP   Coordinate care with GI.    Follow up on anemia.     Discharge Medication List:     What to Do with Your Medications      START taking these medications today unless otherwise stated      Details   pantoprazole 40 MG  tablet  Commonly known as: PROTONIX      Take 1 tablet by mouth in the morning and 1 tablet in the evening. Take before meals.  Authorizing Provider: VIMAL Goodwin                     CONTINUE taking these medications which have NOT CHANGED      Details   Cholecalciferol 125 mcg (5,000 units) tablet      Take by mouth daily. Vitamin D+Vitamin K     DISPENSE      21 mg. Iron Builder - takes two tablets daily     lisinopril 20 MG tablet  Commonly known as: ZESTRIL      TAKE 1 TABLET BY MOUTH DAILY  Authorizing Provider: Lisa Catherine MD     MULTIVITAL PO      Take 1 tablet by mouth daily. Take one daily     PROBIOTIC ACIDOPHILUS PO      Take 1 tablet by mouth daily.                        Where to Get Your Medications      These medications were sent to [id number] - [place], [place] - [address]  [address] [facility] [address]    Hours: M-F 7a-7p,SA 8a-4p,AGUILERA 9a-4p Phone: [phone]   · pantoprazole 40 MG tablet       Disposition: patient is being discharged to home.     Follow-up with:   Lisa Catherine MD  [address] [place] [address]  [phone]    Follow up in 1 week(s)  hospital discharge follow up.    VIMAL Godwin  [address]  [facility] [address]  [phone]    Follow up in 1 week(s)  GI OP follow up.Hospital will call patient with appointment details.     Time spent on discharge was 45 minutes.   Signed:   Linda Sifuentes DNP, APNP   [facility]  4/12/2023   4:09 PM           -------------------------------------------------------------------------------------  The patient's symptoms, physical findings, studies, assessment and plan were done in conjunction with the Advanced practice Clinician (APC). Please refer to the APC note dated, 4/12/2023. I have examined this patient, reviewed all pertinent lab and radiology data, and determined the findings detailed above. I agree with the assessment and plan as detailed above, with  additional discussion below.    Vital Signs:   Vitals:    04/12/23 1537   BP: 122/67   Pulse: 75   Resp: 16   Temp: 98.9 °F (37.2 °C)            Physical Exam:      General: NAD, obese   Skin: Warm and dry, No rash, No icterus.  Respiratory: Clear to auscultation bilaterally, breathing non labored   Cardio: S1, S2, regular rate and rhythm, no murmur   Abdomen: BS +, not tender, not distended; No masses   Muscular: ROM intact, no lower extremity edema  Neurologic: A & O X 3, no focal deficits             Assessment:   # Acute blood loss anemia due to GI bleeding s/p PRBC transfusion - unclear exact source of bleeding   # Interstitial lung disease   # HTN           Plan:  - EGD and colonoscopy unremarkable  - repeat enterography without active bleeding   - refused oral iron supplements  - PPI  - advised to follow up with PCP, and GI for outpatient capsule endoscopy                        Signed:  Momcilo Durdevic, MD  4/12/2023  4:41 PM

## (undated) DEVICE — MEDI-VAC NON-CONDUCTIVE SUCTION TUBING 6MM X 1.8M (6FT.) L: Brand: CARDINAL HEALTH

## (undated) DEVICE — STERILE LATEX POWDER-FREE SURGICAL GLOVESWITH NITRILE COATING: Brand: PROTEXIS

## (undated) DEVICE — 3 ML SYRINGE LUER-LOCK TIP: Brand: MONOJECT

## (undated) DEVICE — LINE MNTR ADLT SET O2 INTMD

## (undated) DEVICE — PROXIMATE RH ROTATING HEAD SKIN STAPLERS (35 WIDE) CONTAINS 35 STAINLESS STEEL STAPLES: Brand: PROXIMATE

## (undated) DEVICE — Device: Brand: DEFENDO AIR/WATER/SUCTION AND BIOPSY VALVE

## (undated) DEVICE — DRAIN RESERVOIR RELIAVAC 100CC

## (undated) DEVICE — SOL  .9 1000ML BTL

## (undated) DEVICE — 6 ML SYRINGE LUER-LOCK TIP: Brand: MONOJECT

## (undated) DEVICE — CONMED SCOPE SAVER BITE BLOCK, 20X27 MM: Brand: SCOPE SAVER

## (undated) DEVICE — Device: Brand: CUSTOM PROCEDURE KIT

## (undated) DEVICE — DRAIN SILICONE FLAT 10MM

## (undated) DEVICE — SUTURE ETHIBOND 0 CX31D

## (undated) DEVICE — SUTURE ETHIBOND EXCEL 0 CT-2

## (undated) DEVICE — DRAPE SHEET LAPAROTOMY

## (undated) DEVICE — BETADINE SOL 32 OZ 10% POVIDON

## (undated) DEVICE — FORCEP RADIAL JAW 4

## (undated) DEVICE — SYRINGE/GUAGE ASSEMBLY

## (undated) DEVICE — ENDOSCOPY PACK UPPER: Brand: MEDLINE INDUSTRIES, INC.

## (undated) DEVICE — 3M™ IOBAN™ 2 ANTIMICROBIAL INCISE DRAPE 6650EZ: Brand: IOBAN™ 2

## (undated) DEVICE — ENDOSCOPY PACK - LOWER: Brand: MEDLINE INDUSTRIES, INC.

## (undated) DEVICE — SUTURE SILK 0

## (undated) DEVICE — CONTAINER SPEC STR 4OZ GRY LID

## (undated) DEVICE — CAUTERY BLADE 2IN INS E1455

## (undated) DEVICE — SUTURE SILK 3-0 SH

## (undated) DEVICE — SUTURE PLAIN GUT 3-0 CT-1

## (undated) DEVICE — A P RESECTION: Brand: MEDLINE INDUSTRIES, INC.

## (undated) NOTE — LETTER
[facility], [place]  Authorization for Invasive Procedures  1.  I hereby authorize Dr. Jeremiah Wilkins or Dr. Jersey Mancilla , my physician and whomever may be designated as the doctor's assistant, to perform the following operation and/or proced performed for the purposes of advancing medicine, science, and/or education, provided my identity is not revealed. If the procedure has been videotaped, the physician/surgeon will obtain the original videotape.  The hospital will not be responsible for stor My signature below affirms that prior to the time of the procedure, I have explained to the patient and/or her legal representative, the risks and benefits involved in the proposed treatment and any reasonable alternative to the proposed treatment.  I have

## (undated) NOTE — LETTER
[facility]  Administration of Anesthesia  1. Edd Simmonds, or _________________________________ acting on her behalf, (Patient) (Dependent/Representative) request to receive anesthesia for my pending procedure/operation/treatment.   A infections, high spinal block, spinal bleeding, seizure, cardiac arrest and death. 7. AWARENESS: I understand that it is possible (but unlikely) to have explicit memory of events from the operating room while under general anesthesia.   8. ELECTROCONVULSIV unconscious pt /Relationship    My signature below affirms that prior to the time of the procedure, I have explained to the patient and/or his/her guardian, the risks and benefits of undergoing anesthesia, as well as any reasonable alternatives.     _______

## (undated) NOTE — LETTER
[address] [place] [address] Rd, [place], [place]     AUTHORIZATION FOR SURGICAL OPERATION OR PROCEDURE    I hereby authorize Dr. Theodora Maxwell MD, my Physician(s) and whomever may be designated as the doctor's Assistant, to perform the follo 4. I consent to the photographing of procedure(s) to be performed for the purposes of advancing medicine, science and/or education, provided my identity is not revealed.  If the procedure has been videotaped, the physician/surgeon will obtain the original v (Witness signature)                                                                                                  (Date)                                (Time)  STATEMENT OF PHYSICIAN My signature below affirms that prior to the time of the procedure;  I

## (undated) NOTE — LETTER
[address] [place] [address] Rd, [place], [place]     AUTHORIZATION FOR SURGICAL OPERATION OR PROCEDURE    I hereby authorize FLAQUITA MESA, my Physician(s) and whomever may be designated as the doctor's Assistant, to perform the following op 4. I consent to the photographing of procedure(s) to be performed for the purposes of advancing medicine, science and/or education, provided my identity is not revealed.  If the procedure has been videotaped, the physician/surgeon will obtain the original v (Witness signature)                                                                                                  (Date)                                (Time)  STATEMENT OF PHYSICIAN My signature below affirms that prior to the time of the procedure;  I

## (undated) NOTE — IP AVS SNAPSHOT
Patient Demographics     Address  [address] [place] [address] Drive [address] Phone  [phone] [facility])  [phone] (Mobile) *Preferred* E-mail Address  [email or web address]      Emergency Contact(s)     Name Relation Home Work Mobile    Fahrdorf Josué Cevallos MD         lactulose 10 GM/15ML Soln  Commonly known as:  CHRONULAC      TAKE 30 ML BY MOUTH 2 TIMES DAILY AS NEEDED TO HAVE 2-3 BOWEL MOVEMENTS A DAY   Marixa Sims MD         lactulose 10 GM/15ML Soln  Commonly known as:  Esme Sauceda [id number] - MAR ACTION REPORT  (last 24 hrs)    ** SITE UNKNOWN **     Order ID Medication Name Action Time Action Reason Comments    [id number] HYDROcodone-acetaminophen (NORCO) 5-325 MG per tab 1 tablet 08/25/19 1825 Given      [id number] HYDROcodone-aceta [id number] Heparin Sodium (Porcine) 5000 UNIT/ML injection 5,000 Units 08/26/19 0939 Given              Recent Vital Signs       Most Recent Value   Vitals  127/59 Filed at 08/26/2019 0938   Pulse  80 Filed at 08/26/2019 0938   Resp  18 Filed at 08/26/2019 following a fall with back pain and gen weakness. Patient states that she has been having worsening lower back pain, last week saw pain specialist and underwent MRI, pain has been constant, non radiating, causing her difficulty with ambulation.   Yesterday Problem Relation Age of Onset   • Cancer Father 80        colon ca   • Heart Disorder Mother 61        mi   • Cancer Sister 61        breast ca   • Breast Cancer Sister 62        dx at age 62   • Breast Cancer Sister 71        dx at age 71       Review of of height of T7 vertebral body. 4. Degenerative changes in the spine.     Dictated by (CST): Anselmo Barthel, MD on 8/20/2019 at 16:53     Approved by (CST): Anselmo Barthel, MD on 8/20/2019 at 17:00          Xr Lumbar Spine (min 2 Views) (cpt=72100)    Result -heg 10 here     FN:  - IVF: 50/hr  - Diet: adv diet    DVT Prophy:scd, heparin  Atrophy: IS  Lines: PIV    Dispo: pending clinical course    Outpatient records or previous hospital records reviewed.      Further recommendations pending patient's clinical c Past Medical History:   Diagnosis Date   • Allergic rhinitis    • Chronic lumbar pain    • Cirrhosis of liver (HCC)    • Esophageal varices (HCC)    • Insomnia    • Recurrent UTI         PSH  Past Surgical History:   Procedure Laterality Date   • CHOLECYST Head:  Normocephalic, without obvious abnormality    Eyes:  Sclera anicteric, No conjunctival pallor    Nose: Nares normal. Septum midline. Throat: Lips, mucosa, and tongue normal.     Neck: Supple,    Lungs:   Clear to auscultation bilaterally.  Normal L5-S1:  Grade 2 spondylolisthesis related to bilateral L5 pars defects. Advanced disc degeneration.     Dictated by (CST): Cesar Ford MD on 8/20/2019 at 17:01     Approved by (CST): Cesar Ford MD on 8/20/2019 at 17:04              ASSESSMENT / PLAN TN.1 Medina Wilson MD on 8/20/2019  5:50 PM                        Consults - MD Consult Notes      Consults signed by Ramana Hernandes MD at 8/22/2019 10:07 PM     Author:  Ramana Hernandes MD Service:  General Surgery Author Type:  Physician    Filed:  8/22/2019 Past Medical History:   Diagnosis Date   • Allergic rhinitis    • Chronic lumbar pain    • Cirrhosis of liver (HCC)    • Esophageal varices (HCC)    • Insomnia    • Recurrent UTI        Past Surgical History  Past Surgical History:   Procedure Laterality D morphINE sulfate (PF) 2 MG/ML injection 2 mg 2 mg Intravenous Q4H PRN   ferrous sulfate EC tab 325 mg 325 mg Oral Daily   gabapentin (NEURONTIN) cap 100 mg 100 mg Oral TID   gabapentin (NEURONTIN) cap 300 mg 300 mg Oral Nightly   lactulose (CHRONULAC) 10 G lidocaine 5 % External Patch Place 2 patches onto the skin daily. Fluticasone Propionate 50 MCG/ACT Nasal Suspension 2 sprays by Each Nare route daily.        Allergies    Advil [Ibuprofen]       BLEEDING  Ambien [Zolpidem]       CONFUSION    Review of Sy HGB 9.5 (L) 08/22/2019    HCT 29.0 (L) 08/22/2019    .0 (L) 08/22/2019    CREATSERUM 0.70 08/22/2019    BUN 14 08/22/2019     (L) 08/22/2019    K 3.7 08/22/2019    CL 99 08/22/2019    CO2 25.0 08/22/2019    GLU 94 08/22/2019    CA 8.5 08/22/2 Advanced disc degeneration.     Dictated by (CST): Wendi De La Torre MD on 8/20/2019 at 17:01     Approved by (CST): Wendi De La Torre MD on 8/20/2019 at 17:04          Us Ascites (cpt=76705)    Result Date: 8/21/2019  CONCLUSION:   Mild ascites, insufficient reyna Thank you for allowing me to participate in the care of your patient. Kayla Donovan MD    8/22/2019[MB. 1]      Electronically signed by Britt Winston MD on 8/22/2019 10:07 PM   Attribution Saldana    MB. 1 - Britt Winston MD on 8/22/2019  3:28 PM  MB. 2 - Marcos back pain and gen weakness found to have [address] w ecoli and c/o abx, had acute on chronic back pain w possible new l2 comrpession fx and  MRI last week with chronic L3 compression fracture, and spondylolisthesis L5-S1, pt underwent kyphoplasty by IR,  Also h Take 1 tablet (40 mg total) by mouth daily. * gabapentin 300 MG Caps  Commonly known as:  NEURONTIN  Take 1 capsule (300 mg total) by mouth nightly.      * gabapentin 100 MG Caps  Commonly known as:  NEURONTIN  Take 1 capsule (100 mg total) by mouth 3 ( Total Time Coordinating Care: Greater than 30 minutes    Patient had opportunity to ask questions and state understand and agree with therapeutic plan as outlined    Thank Angelia Carbajal MD    [facility] Hospitalist  Pager[IC.1]     Electronically signed by The patient's[ST.1] Approx Degree of Impairment: 54.16%[ST.2] has been calculated based on documentation in the [facility] '6 clicks' Inpatient Basic Mobility Short Form.   Research supports that patients with this level of impairment may benefit from sub-acute Lot[ST. 2]   How much help from another person does the patient currently need. ..[ST.1]   -   Moving to and from a bed to a chair (including a wheelchair)?: A Little   -   Need to walk in hospital room?: A Little   -   Climbing 3-5 steps with a railing?: A Occupational Therapy Notes (last 72 hours) (Notes from 8/23/2019 12:31 PM through 8/26/2019 12:31 PM)      Occupational Therapy Note signed by Jessy Hi OT at 8/25/2019  4:25 PM  Version 1 of 1    Author:  Jessy Hi OT Service:  Rehab Author Melina Winchester training;UE strengthening/ROM; Endurance training;Patient/Family education; Compensatory technique education    SUBJECTIVE  \"I can't reach my feet b/c of my back and belly\"    OBJECTIVE  Precautions: Spine    WEIGHT BEARING RESTRICTION  Weight Bearing Rest Patient will tolerate standing for 5-8 minutes in prep for adls with Mod I    Comment: stood 1 min with CGA    Patient will complete morning ADL routine Mod I while reporting pain no more than 3/10   Comment: 8/10 pain         Goals  on: 19  Fr

## (undated) NOTE — LETTER
[address], [place]  Authorization for Invasive Procedures  1.  I hereby authorize Dr. Galina Anderson** , my physician and whomever may be designated as the doctor's assistant, to perform the following operation and/or procedure:  *Kyphopl performed for the purposes of advancing medicine, science, and/or education, provided my identity is not revealed. If the procedure has been videotaped, the physician/surgeon will obtain the original videotape.  The hospital will not be responsible for stor My signature below affirms that prior to the time of the procedure, I have explained to the patient and/or her legal representative, the risks and benefits involved in the proposed treatment and any reasonable alternative to the proposed treatment.  I have

## (undated) NOTE — LETTER
[facility]  Administration of Anesthesia  1. Magdalena Lopez, or _________________________________ acting on her behalf, (Patient) (Dependent/Representative) request to receive anesthesia for my pending procedure/operation/treatment.   A infections, high spinal block, spinal bleeding, seizure, cardiac arrest and death. 7. AWARENESS: I understand that it is possible (but unlikely) to have explicit memory of events from the operating room while under general anesthesia.   8. ELECTROCONVULSIV unconscious pt /Relationship    My signature below affirms that prior to the time of the procedure, I have explained to the patient and/or his/her guardian, the risks and benefits of undergoing anesthesia, as well as any reasonable alternatives.     _______

## (undated) NOTE — ED AVS SNAPSHOT
Teresita Campos   MRN: [id number]    Department:  [facility] Emergency Department   Date of Visit:  11/21/2019           Disclosure     Insurance plans vary and the physician(s) referred by the ER may not be covered by your plan.  Please contact within the next three months to obtain basic health screening including reassessment of your blood pressure.     IF THERE IS ANY CHANGE OR WORSENING OF YOUR CONDITION, CALL YOUR PRIMARY CARE PHYSICIAN AT ONCE OR RETURN IMMEDIATELY TO THE EMERGENCY DEPARTMEN

## (undated) NOTE — LETTER
[address] [place] [address]  Authorization for Invasive Procedures  1.  I hereby authorize  Dr. Landon Malhotra , my physician and whomever may be designated as the doctor's assistant, to perform the following operation and/or procedure: performed for the purposes of advancing medicine, science, and/or education, provided my identity is not revealed. If the procedure has been videotaped, the physician/surgeon will obtain the original videotape.  The hospital will not be responsible for stor My signature below affirms that prior to the time of the procedure, I have explained to the patient and/or her legal representative, the risks and benefits involved in the proposed treatment and any reasonable alternative to the proposed treatment.  I have

## (undated) NOTE — LETTER
[address], [place]  Authorization for Invasive Procedures  1.  I hereby authorize Dr. Tabatha Davey , my physician and whomever may be designated as the doctor's assistant, to perform the following operation and/or procedure:  Thoracic si performed for the purposes of advancing medicine, science, and/or education, provided my identity is not revealed. If the procedure has been videotaped, the physician/surgeon will obtain the original videotape.  The hospital will not be responsible for stor My signature below affirms that prior to the time of the procedure, I have explained to the patient and/or her legal representative, the risks and benefits involved in the proposed treatment and any reasonable alternative to the proposed treatment.  I have

## (undated) NOTE — LETTER
[address] [place] [address] Rd, [place], [place]     AUTHORIZATION FOR SURGICAL OPERATION OR PROCEDURE    I hereby authorize FLAQUITA MESA, my Physician(s) and whomever may be designated as the doctor's Assistant, to perform the following op 4. I consent to the photographing of procedure(s) to be performed for the purposes of advancing medicine, science and/or education, provided my identity is not revealed.  If the procedure has been videotaped, the physician/surgeon will obtain the original v (Witness signature)                                                                                                  (Date)                                (Time)  STATEMENT OF PHYSICIAN My signature below affirms that prior to the time of the procedure;  I

## (undated) NOTE — IP AVS SNAPSHOT
[facility]            (For Outpatient Use Only) Initial Admit Date: 8/20/2019   Inpt/Obs Admit Date: Inpt: 8/20/19 / Obs: N/A   Discharge Date:    Rebecca Copier:  [de-identified]   MRN: [de-identified]   CSN: [id number]   CEID: [id number]        [facility] Group Number:  Insurance Type:    Subscriber Name:  Subscriber :    Subscriber ID:  Pt Rel to Subscriber:    Hospital Account Financial Class: [facility]    2019

## (undated) NOTE — LETTER
11/1/2018          Armando Lazcano  [address] [place] [id number]    Dear Brielle Collado,       Here are the findings from your recent EGD (Upper  Endoscopy) :     Biopsies in the small intestine were normal. Will still need to repeat colonoscopy wit

## (undated) NOTE — LETTER
[address], [place]  Authorization for Invasive Procedures  1.  I hereby authorize Dr. Berneice Kawasaki , my physician and whomever may be designated as the doctor's assistant, to perform the following operation and/or procedure:  [facility] performed for the purposes of advancing medicine, science, and/or education, provided my identity is not revealed. If the procedure has been videotaped, the physician/surgeon will obtain the original videotape.  The hospital will not be responsible for stor My signature below affirms that prior to the time of the procedure, I have explained to the patient and/or her legal representative, the risks and benefits involved in the proposed treatment and any reasonable alternative to the proposed treatment.  I have

## (undated) NOTE — LETTER
2018    Dg Fong   : 1950          To Whom It May Concern:       Ms. Nelly Mckinnon was admitted to our service on 18 with anticipate  or  and earliest return to work date would be 10/15/18 with final determination